# Patient Record
Sex: MALE | Race: AMERICAN INDIAN OR ALASKA NATIVE | Employment: OTHER | URBAN - METROPOLITAN AREA
[De-identification: names, ages, dates, MRNs, and addresses within clinical notes are randomized per-mention and may not be internally consistent; named-entity substitution may affect disease eponyms.]

---

## 2017-06-05 PROBLEM — I77.9 BILATERAL CAROTID ARTERY DISEASE (HCC): Status: ACTIVE | Noted: 2017-06-05

## 2017-06-22 PROCEDURE — 82570 ASSAY OF URINE CREATININE: CPT | Performed by: INTERNAL MEDICINE

## 2017-06-22 PROCEDURE — 82043 UR ALBUMIN QUANTITATIVE: CPT | Performed by: INTERNAL MEDICINE

## 2017-09-26 PROBLEM — M17.12 PRIMARY OSTEOARTHRITIS OF LEFT KNEE: Status: ACTIVE | Noted: 2017-09-26

## 2017-12-15 ENCOUNTER — HOSPITAL ENCOUNTER (EMERGENCY)
Facility: HOSPITAL | Age: 82
Discharge: HOME | End: 2017-12-15
Attending: EMERGENCY MEDICINE
Payer: MEDICARE

## 2017-12-15 VITALS
OXYGEN SATURATION: 95 % | HEIGHT: 70 IN | DIASTOLIC BLOOD PRESSURE: 92 MMHG | TEMPERATURE: 97 F | WEIGHT: 200 LBS | RESPIRATION RATE: 18 BRPM | BODY MASS INDEX: 28.63 KG/M2 | SYSTOLIC BLOOD PRESSURE: 179 MMHG | HEART RATE: 77 BPM

## 2017-12-15 DIAGNOSIS — K06.8 GUMS, BLEEDING: Primary | ICD-10-CM

## 2017-12-15 PROCEDURE — 85730 THROMBOPLASTIN TIME PARTIAL: CPT | Performed by: EMERGENCY MEDICINE

## 2017-12-15 PROCEDURE — 85025 COMPLETE CBC W/AUTO DIFF WBC: CPT | Performed by: EMERGENCY MEDICINE

## 2017-12-15 PROCEDURE — 99283 EMERGENCY DEPT VISIT LOW MDM: CPT

## 2017-12-15 PROCEDURE — 85610 PROTHROMBIN TIME: CPT | Performed by: EMERGENCY MEDICINE

## 2017-12-15 PROCEDURE — 36415 COLL VENOUS BLD VENIPUNCTURE: CPT

## 2017-12-15 PROCEDURE — 80053 COMPREHEN METABOLIC PANEL: CPT | Performed by: EMERGENCY MEDICINE

## 2017-12-15 NOTE — ED PROVIDER NOTES
Patient Seen in: [facility] Emergency Department    History   Patient presents with:  Bleeding (hematologic)    Stated Complaint: bleeding on blood thinners    HPI    61-year-old male presents emergency department who has been having some bleeding f Systems    Positive for stated complaint: bleeding on blood thinners  Other systems are as noted in HPI. Constitutional and vital signs reviewed. All other systems reviewed and negative except as noted above.     Physical Exam   ED Triage Vitals [12/1 units/mL.      PROTHROMBIN TIME (PT) - Abnormal; Notable for the following:     PT 20.2 (*)     INR 1.70 (*)     All other components within normal limits   CBC W/ DIFFERENTIAL - Abnormal; Notable for the following:     HGB 12.4 (*)     MCV 77.0 (*)     [facility] Medication List as of 12/15/2017  6:42 PM

## 2017-12-15 NOTE — ED NOTES
Pt resting on cart. Pt rinsed mouth with water and is applying pressure to location that is bleeding. Family at bedside. Updated on plan.

## 2018-03-01 PROCEDURE — 82570 ASSAY OF URINE CREATININE: CPT | Performed by: NURSE PRACTITIONER

## 2018-03-01 PROCEDURE — 82043 UR ALBUMIN QUANTITATIVE: CPT | Performed by: NURSE PRACTITIONER

## 2018-04-23 PROBLEM — M19.079 ARTHRITIS OF FOOT: Status: ACTIVE | Noted: 2018-04-23

## 2018-06-29 PROBLEM — I50.32 CHRONIC DIASTOLIC HEART FAILURE (HCC): Status: ACTIVE | Noted: 2018-06-29

## 2018-07-17 PROBLEM — I70.0 AORTIC ATHEROSCLEROSIS (HCC): Status: ACTIVE | Noted: 2018-07-17

## 2018-07-17 PROBLEM — R80.9 MICROALBUMINURIA DUE TO TYPE 2 DIABETES MELLITUS (HCC): Status: ACTIVE | Noted: 2018-07-17

## 2018-07-17 PROBLEM — R80.9 MICROALBUMINURIA DUE TO TYPE 2 DIABETES MELLITUS: Status: ACTIVE | Noted: 2018-07-17

## 2018-07-17 PROBLEM — E11.65 UNCONTROLLED TYPE 2 DIABETES MELLITUS WITH HYPERGLYCEMIA, WITHOUT LONG-TERM CURRENT USE OF INSULIN (HCC): Status: ACTIVE | Noted: 2018-07-17

## 2018-07-17 PROBLEM — E11.29 MICROALBUMINURIA DUE TO TYPE 2 DIABETES MELLITUS (HCC): Status: ACTIVE | Noted: 2018-07-17

## 2018-07-17 PROBLEM — E11.29 MICROALBUMINURIA DUE TO TYPE 2 DIABETES MELLITUS: Status: ACTIVE | Noted: 2018-07-17

## 2018-08-24 ENCOUNTER — ANESTHESIA EVENT (OUTPATIENT)
Dept: ENDOSCOPY | Facility: HOSPITAL | Age: 83
End: 2018-08-24
Payer: MEDICARE

## 2018-08-24 RX ORDER — SODIUM CHLORIDE, SODIUM LACTATE, POTASSIUM CHLORIDE, CALCIUM CHLORIDE 600; 310; 30; 20 MG/100ML; MG/100ML; MG/100ML; MG/100ML
INJECTION, SOLUTION INTRAVENOUS CONTINUOUS
Status: CANCELLED | OUTPATIENT
Start: 2018-08-24

## 2018-09-05 ENCOUNTER — ANESTHESIA (OUTPATIENT)
Dept: ENDOSCOPY | Facility: HOSPITAL | Age: 83
End: 2018-09-05
Payer: MEDICARE

## 2018-09-05 ENCOUNTER — HOSPITAL ENCOUNTER (OUTPATIENT)
Facility: HOSPITAL | Age: 83
Setting detail: HOSPITAL OUTPATIENT SURGERY
Discharge: HOME | End: 2018-09-05
Attending: INTERNAL MEDICINE | Admitting: INTERNAL MEDICINE
Payer: MEDICARE

## 2018-09-05 VITALS
WEIGHT: 195 LBS | HEART RATE: 100 BPM | HEIGHT: 70 IN | OXYGEN SATURATION: 97 % | BODY MASS INDEX: 27.92 KG/M2 | SYSTOLIC BLOOD PRESSURE: 107 MMHG | RESPIRATION RATE: 20 BRPM | TEMPERATURE: 98 F | DIASTOLIC BLOOD PRESSURE: 73 MMHG

## 2018-09-05 DIAGNOSIS — R13.19 ESOPHAGEAL DYSPHAGIA: ICD-10-CM

## 2018-09-05 LAB — GLUCOSE BLD-MCNC: 132 MG/DL (ref 65–99)

## 2018-09-05 PROCEDURE — 0DB38ZX EXCISION OF LOWER ESOPHAGUS, VIA NATURAL OR ARTIFICIAL OPENING ENDOSCOPIC, DIAGNOSTIC: ICD-10-PCS | Performed by: INTERNAL MEDICINE

## 2018-09-05 PROCEDURE — 82962 GLUCOSE BLOOD TEST: CPT

## 2018-09-05 PROCEDURE — 0D738ZZ DILATION OF LOWER ESOPHAGUS, VIA NATURAL OR ARTIFICIAL OPENING ENDOSCOPIC: ICD-10-PCS | Performed by: INTERNAL MEDICINE

## 2018-09-05 PROCEDURE — 88305 TISSUE EXAM BY PATHOLOGIST: CPT | Performed by: INTERNAL MEDICINE

## 2018-09-05 RX ORDER — SODIUM CHLORIDE, SODIUM LACTATE, POTASSIUM CHLORIDE, CALCIUM CHLORIDE 600; 310; 30; 20 MG/100ML; MG/100ML; MG/100ML; MG/100ML
INJECTION, SOLUTION INTRAVENOUS CONTINUOUS
Status: DISCONTINUED | OUTPATIENT
Start: 2018-09-05 | End: 2018-09-05

## 2018-09-05 RX ORDER — DEXTROSE MONOHYDRATE 25 G/50ML
50 INJECTION, SOLUTION INTRAVENOUS
Status: DISCONTINUED | OUTPATIENT
Start: 2018-09-05 | End: 2018-09-05

## 2018-09-05 RX ORDER — NALOXONE HYDROCHLORIDE 0.4 MG/ML
80 INJECTION, SOLUTION INTRAMUSCULAR; INTRAVENOUS; SUBCUTANEOUS AS NEEDED
Status: DISCONTINUED | OUTPATIENT
Start: 2018-09-05 | End: 2018-09-05

## 2018-09-05 NOTE — OPERATIVE REPORT
[facility] OPERATIVE REPORT   PATIENT NAME: Tevin Cunningham  MRN: [id number]  DATE OF OPERATION: 9/5/2018  PREOPERATIVE DIAGNOSIS:   1. GERD  2.     Dysphagia  POSTOPERATIVE DIAGNOSES:    Normal duodenum    Normal stomach  Dist

## 2018-09-05 NOTE — ANESTHESIA PREPROCEDURE EVALUATION
PRE-OP EVALUATION    Patient Name: Elisa Ferguson    Pre-op Diagnosis: Esophageal dysphagia [R13.10]    Procedure(s):  ESOPHAGOGASTRODUODENOSCOPY    Surgeon(s) and Role:     * Leonardo Mendez MD - Primary    Pre-op vitals reviewed.   Temp: 97.7 °F (36.5 °C) 90 tablet Rfl: 3   Levothyroxine Sodium (SYNTHROID, LEVOTHROID) 75 MCG Oral Tab Take 75 mcg by mouth before breakfast. Disp:  Rfl:        Allergies: Patient has no known allergies. Anesthesia Evaluation    Patient summary reviewed.     Anesthetic Compl plan discussed with surgeon and patient.       Plan/risks discussed with: patient                Present on Admission:  **None**

## 2018-09-05 NOTE — H&P
HPI:  Larry Davis is a [de-identified]year old male. 8/13/1935. Patient presents with:  Abdominal Pain: Stomach pain. Gas           Thank you for sending your patient to see me for evaluation of  Epigastric pain .     Pt presents for complaints of abdominal pain. [facility]  9/16/2015: [facility] CATARACT EXTRACAP,INSERT LENS Left      Comment: Procedure: LEFT PHACOEMULSIFICATION OF                CATARACT WITH INTRAOCULAR LENS IMPLANT [id number];                 Surgeon:  Charlotte العراقي MD;  Location: [facility]                SURGICAL CE mg total) by mouth daily. Disp: 90 capsule Rfl: 3   GLIMEPIRIDE 2 MG Oral Tab TAKE 1 TABLET BY MOUTH DAILY WITH BREAKFAST Disp: 90 tablet Rfl: 3   MetFORMIN HCl 1000 MG Oral Tab Take 1 tablet (1,000 mg total) by mouth 2 (two) times daily with meals.  Disp: bleeding or bruising  ENDOCRINE: denies inc in thirst or heat/cold intolerance  ALL/ASTHMA: denies hx of allergy or asthma        PHYSICAL EXAM:   /74   Ht 5' 10\" (1.778 m)   Wt 208 lb (94.3 kg)   BMI 29.84 kg/m²    GENERAL: well developed, well nou A  low-density focus within the anterior lower pole left kidney is too small to characterize but  statistically likely represents a cyst. No abdominal or pelvic lymphadenopathy is identified.  There  is mild nonspecific thickening of the distal esophagus an calculi. Spleen:  The spleen measures 9.2 cm and demonstrates homogeneous parenchyma. Aorta: Loye Luke is no abdominal aortic aneurysm.    IVC: Patent. Free Fluid:  None.  =====  IMPRESSION:    1. Incomplete assessment of pancreas. 2. Hepatic steatosis.

## 2018-09-10 NOTE — PROGRESS NOTES
9/10/2018  Chand Can  [address]  [place] [id number]    Dear Beena Bautista,       Here are the biopsy/pathology findings from your recent EGD (Upper  Endoscopy) :     reflux esophagitis - an inflammation of the esophagus due to GERD.       Follow Up:

## 2018-11-23 ENCOUNTER — IMAGING SERVICES (OUTPATIENT)
Dept: OTHER | Age: 83
End: 2018-11-23

## 2019-01-24 ENCOUNTER — IMAGING SERVICES (OUTPATIENT)
Dept: OTHER | Age: 84
End: 2019-01-24

## 2019-04-01 PROBLEM — I48.20 CHRONIC ATRIAL FIBRILLATION (HCC): Status: ACTIVE | Noted: 2019-04-01

## 2019-05-26 PROBLEM — E11.59 TYPE 2 DIABETES MELLITUS WITH CIRCULATORY DISORDER, WITHOUT LONG-TERM CURRENT USE OF INSULIN (HCC): Status: ACTIVE | Noted: 2019-05-26

## 2019-07-01 PROBLEM — I77.810 AORTIC ROOT DILATION (HCC): Status: ACTIVE | Noted: 2019-07-01

## 2019-07-02 PROCEDURE — 87077 CULTURE AEROBIC IDENTIFY: CPT | Performed by: INTERNAL MEDICINE

## 2019-07-02 PROCEDURE — 87086 URINE CULTURE/COLONY COUNT: CPT | Performed by: INTERNAL MEDICINE

## 2019-07-02 PROCEDURE — 87186 SC STD MICRODIL/AGAR DIL: CPT | Performed by: INTERNAL MEDICINE

## 2019-08-28 PROBLEM — R35.0 URINARY FREQUENCY: Status: ACTIVE | Noted: 2019-08-28

## 2019-09-06 ENCOUNTER — TELEPHONE (OUTPATIENT)
Dept: CT IMAGING | Facility: HOSPITAL | Age: 84
End: 2019-09-06

## 2020-06-01 NOTE — IMAGING NOTE
BS K173 pacer is NOT MR conditional. Updated daughter-in-law. Will follow up with ordering MD. Exam to be canceled. Daughter-in-law verbalized understanding.

## 2020-06-08 ENCOUNTER — HOSPITAL ENCOUNTER (OUTPATIENT)
Dept: MRI IMAGING | Facility: HOSPITAL | Age: 85
Discharge: HOME | End: 2020-06-08
Attending: ORTHOPAEDIC SURGERY
Payer: MEDICARE

## 2020-08-24 PROBLEM — Z79.01 CHRONIC ANTICOAGULATION: Status: ACTIVE | Noted: 2020-08-24

## 2020-08-24 PROBLEM — D68.69 SECONDARY HYPERCOAGULABLE STATE (HCC): Status: ACTIVE | Noted: 2020-08-24

## 2020-08-24 PROBLEM — M17.0 PRIMARY OSTEOARTHRITIS OF BOTH KNEES: Status: ACTIVE | Noted: 2020-08-24

## 2020-10-05 PROBLEM — M19.079 ARTHRITIS OF FOOT: Status: RESOLVED | Noted: 2018-04-23 | Resolved: 2020-10-05

## 2020-10-06 PROBLEM — M12.811 RIGHT ROTATOR CUFF TEAR ARTHROPATHY: Status: ACTIVE | Noted: 2020-10-06

## 2020-10-06 PROBLEM — M25.011: Status: ACTIVE | Noted: 2020-10-06

## 2020-10-06 PROBLEM — M75.101 RIGHT ROTATOR CUFF TEAR ARTHROPATHY: Status: ACTIVE | Noted: 2020-10-06

## 2021-06-30 PROBLEM — M25.011: Status: RESOLVED | Noted: 2020-10-06 | Resolved: 2021-06-30

## 2021-06-30 PROBLEM — E78.2 MIXED HYPERLIPIDEMIA: Status: ACTIVE | Noted: 2021-06-30

## 2021-06-30 PROBLEM — I65.23 BILATERAL CAROTID ARTERY STENOSIS: Status: ACTIVE | Noted: 2021-06-30

## 2022-05-24 ENCOUNTER — HOSPITAL ENCOUNTER (OUTPATIENT)
Dept: INTERVENTIONAL RADIOLOGY/VASCULAR | Facility: HOSPITAL | Age: 87
Discharge: HOME | End: 2022-05-24
Attending: INTERNAL MEDICINE | Admitting: INTERNAL MEDICINE
Payer: MEDICARE

## 2022-05-24 VITALS
WEIGHT: 187 LBS | HEIGHT: 71 IN | OXYGEN SATURATION: 97 % | BODY MASS INDEX: 26.18 KG/M2 | DIASTOLIC BLOOD PRESSURE: 79 MMHG | HEART RATE: 66 BPM | SYSTOLIC BLOOD PRESSURE: 155 MMHG | TEMPERATURE: 98 F | RESPIRATION RATE: 25 BRPM

## 2022-05-24 DIAGNOSIS — Z45.010 PACEMAKER AT END OF BATTERY LIFE: ICD-10-CM

## 2022-05-24 LAB — GLUCOSE BLD-MCNC: 142 MG/DL (ref 70–99)

## 2022-05-24 PROCEDURE — 0JPT0PZ REMOVAL OF CARDIAC RHYTHM RELATED DEVICE FROM TRUNK SUBCUTANEOUS TISSUE AND FASCIA, OPEN APPROACH: ICD-10-PCS | Performed by: INTERNAL MEDICINE

## 2022-05-24 PROCEDURE — 0JH606Z INSERTION OF PACEMAKER, DUAL CHAMBER INTO CHEST SUBCUTANEOUS TISSUE AND FASCIA, OPEN APPROACH: ICD-10-PCS | Performed by: INTERNAL MEDICINE

## 2022-05-24 PROCEDURE — 99153 MOD SED SAME PHYS/QHP EA: CPT | Performed by: INTERNAL MEDICINE

## 2022-05-24 PROCEDURE — 3E0102A INTRODUCTION OF ANTI-INFECTIVE ENVELOPE INTO SUBCUTANEOUS TISSUE, OPEN APPROACH: ICD-10-PCS | Performed by: INTERNAL MEDICINE

## 2022-05-24 PROCEDURE — 99152 MOD SED SAME PHYS/QHP 5/>YRS: CPT | Performed by: INTERNAL MEDICINE

## 2022-05-24 PROCEDURE — 82962 GLUCOSE BLOOD TEST: CPT

## 2022-05-24 PROCEDURE — 33228 REMV&REPLC PM GEN DUAL LEAD: CPT | Performed by: INTERNAL MEDICINE

## 2022-05-24 RX ORDER — LIDOCAINE HYDROCHLORIDE 10 MG/ML
INJECTION, SOLUTION EPIDURAL; INFILTRATION; INTRACAUDAL; PERINEURAL
Status: COMPLETED
Start: 2022-05-24 | End: 2022-05-24

## 2022-05-24 RX ORDER — CEFAZOLIN SODIUM/WATER 2 G/20 ML
SYRINGE (ML) INTRAVENOUS
Status: COMPLETED
Start: 2022-05-24 | End: 2022-05-24

## 2022-05-24 RX ORDER — CEFAZOLIN SODIUM/WATER 2 G/20 ML
2 SYRINGE (ML) INTRAVENOUS
Status: DISCONTINUED | OUTPATIENT
Start: 2022-05-24 | End: 2022-05-24 | Stop reason: HOSPADM

## 2022-05-24 RX ORDER — CHLORHEXIDINE GLUCONATE 4 G/100ML
30 SOLUTION TOPICAL
Status: DISCONTINUED | OUTPATIENT
Start: 2022-05-24 | End: 2022-05-24 | Stop reason: HOSPADM

## 2022-05-24 RX ORDER — MIDAZOLAM HYDROCHLORIDE 1 MG/ML
INJECTION INTRAMUSCULAR; INTRAVENOUS
Status: COMPLETED
Start: 2022-05-24 | End: 2022-05-24

## 2022-05-24 RX ORDER — SODIUM CHLORIDE 9 MG/ML
INJECTION, SOLUTION INTRAVENOUS
Status: DISCONTINUED | OUTPATIENT
Start: 2022-05-25 | End: 2022-05-24 | Stop reason: HOSPADM

## 2022-05-24 RX ORDER — VANCOMYCIN HYDROCHLORIDE 1 G/20ML
INJECTION, POWDER, LYOPHILIZED, FOR SOLUTION INTRAVENOUS
Status: COMPLETED
Start: 2022-05-24 | End: 2022-05-24

## 2022-05-24 NOTE — PROGRESS NOTES
Rc'd pt from cath lab s/p gen change in stable condition. VSS. Aquacel to left chedt is soft, clean and dry. No bleeding or hematoma. Pt denies c/o pain or discomfort. Dr Get Howe at bedside along with pts son. Pt tolerating po intake well.     13:20: Bedrest completed. Site is stable. IV removed. Pt ambulated and tolerated well. Voided. Reviewed discharge instructions with pts son, verbalizes understanding. Home via w/c in stable condition without c/o. Son is the .

## 2022-08-13 ENCOUNTER — APPOINTMENT (OUTPATIENT)
Dept: GENERAL RADIOLOGY | Facility: HOSPITAL | Age: 87
End: 2022-08-13
Payer: MEDICARE

## 2022-08-13 ENCOUNTER — HOSPITAL ENCOUNTER (EMERGENCY)
Facility: HOSPITAL | Age: 87
Discharge: HOME | End: 2022-08-14
Attending: EMERGENCY MEDICINE
Payer: MEDICARE

## 2022-08-13 DIAGNOSIS — R07.9 CHEST PAIN OF UNCERTAIN ETIOLOGY: Primary | ICD-10-CM

## 2022-08-13 LAB
ALBUMIN SERPL-MCNC: 3.6 G/DL (ref 3.4–5)
ALBUMIN/GLOB SERPL: 0.9 {RATIO} (ref 1–2)
ALP LIVER SERPL-CCNC: 146 U/L
ALT SERPL-CCNC: 27 U/L
ANION GAP SERPL CALC-SCNC: 3 MMOL/L (ref 0–18)
AST SERPL-CCNC: 18 U/L (ref 15–37)
BASOPHILS # BLD AUTO: 0.03 X10(3) UL (ref 0–0.2)
BASOPHILS NFR BLD AUTO: 0.5 %
BILIRUB SERPL-MCNC: 0.8 MG/DL (ref 0.1–2)
BUN BLD-MCNC: 26 MG/DL (ref 7–18)
CALCIUM BLD-MCNC: 9.2 MG/DL (ref 8.5–10.1)
CHLORIDE SERPL-SCNC: 98 MMOL/L (ref 98–112)
CO2 SERPL-SCNC: 30 MMOL/L (ref 21–32)
CREAT BLD-MCNC: 1.26 MG/DL
EOSINOPHIL # BLD AUTO: 0.13 X10(3) UL (ref 0–0.7)
EOSINOPHIL NFR BLD AUTO: 2 %
ERYTHROCYTE [DISTWIDTH] IN BLOOD BY AUTOMATED COUNT: 17.5 %
GFR SERPLBLD BASED ON 1.73 SQ M-ARVRAT: 56 ML/MIN/1.73M2 (ref 60–?)
GLOBULIN PLAS-MCNC: 3.8 G/DL (ref 2.8–4.4)
GLUCOSE BLD-MCNC: 123 MG/DL (ref 70–99)
HCT VFR BLD AUTO: 41.5 %
HGB BLD-MCNC: 13.4 G/DL
IMM GRANULOCYTES # BLD AUTO: 0.04 X10(3) UL (ref 0–1)
IMM GRANULOCYTES NFR BLD: 0.6 %
LYMPHOCYTES # BLD AUTO: 1.24 X10(3) UL (ref 1–4)
LYMPHOCYTES NFR BLD AUTO: 18.7 %
MCH RBC QN AUTO: 26.5 PG (ref 26–34)
MCHC RBC AUTO-ENTMCNC: 32.3 G/DL (ref 31–37)
MCV RBC AUTO: 82 FL
MONOCYTES # BLD AUTO: 0.52 X10(3) UL (ref 0.1–1)
MONOCYTES NFR BLD AUTO: 7.9 %
NEUTROPHILS # BLD AUTO: 4.66 X10 (3) UL (ref 1.5–7.7)
NEUTROPHILS # BLD AUTO: 4.66 X10(3) UL (ref 1.5–7.7)
NEUTROPHILS NFR BLD AUTO: 70.3 %
NT-PROBNP SERPL-MCNC: 506 PG/ML (ref ?–450)
OSMOLALITY SERPL CALC.SUM OF ELEC: 278 MOSM/KG (ref 275–295)
PLATELET # BLD AUTO: 156 10(3)UL (ref 150–450)
POTASSIUM SERPL-SCNC: 4 MMOL/L (ref 3.5–5.1)
PROT SERPL-MCNC: 7.4 G/DL (ref 6.4–8.2)
RBC # BLD AUTO: 5.06 X10(6)UL
SARS-COV-2 RNA RESP QL NAA+PROBE: NOT DETECTED
SODIUM SERPL-SCNC: 131 MMOL/L (ref 136–145)
TROPONIN I HIGH SENSITIVITY: 11 NG/L
TROPONIN I HIGH SENSITIVITY: 13 NG/L
WBC # BLD AUTO: 6.6 X10(3) UL (ref 4–11)

## 2022-08-13 PROCEDURE — 85025 COMPLETE CBC W/AUTO DIFF WBC: CPT | Performed by: EMERGENCY MEDICINE

## 2022-08-13 PROCEDURE — 36415 COLL VENOUS BLD VENIPUNCTURE: CPT

## 2022-08-13 PROCEDURE — 93005 ELECTROCARDIOGRAM TRACING: CPT

## 2022-08-13 PROCEDURE — 80053 COMPREHEN METABOLIC PANEL: CPT

## 2022-08-13 PROCEDURE — 83880 ASSAY OF NATRIURETIC PEPTIDE: CPT | Performed by: EMERGENCY MEDICINE

## 2022-08-13 PROCEDURE — 84484 ASSAY OF TROPONIN QUANT: CPT

## 2022-08-13 PROCEDURE — 99284 EMERGENCY DEPT VISIT MOD MDM: CPT

## 2022-08-13 PROCEDURE — 71045 X-RAY EXAM CHEST 1 VIEW: CPT

## 2022-08-13 PROCEDURE — 84484 ASSAY OF TROPONIN QUANT: CPT | Performed by: EMERGENCY MEDICINE

## 2022-08-13 PROCEDURE — 85025 COMPLETE CBC W/AUTO DIFF WBC: CPT

## 2022-08-13 PROCEDURE — 80053 COMPREHEN METABOLIC PANEL: CPT | Performed by: EMERGENCY MEDICINE

## 2022-08-13 PROCEDURE — 93010 ELECTROCARDIOGRAM REPORT: CPT

## 2022-08-14 VITALS
RESPIRATION RATE: 18 BRPM | HEART RATE: 56 BPM | DIASTOLIC BLOOD PRESSURE: 76 MMHG | TEMPERATURE: 98 F | SYSTOLIC BLOOD PRESSURE: 135 MMHG | OXYGEN SATURATION: 94 %

## 2022-08-14 NOTE — ED INITIAL ASSESSMENT (HPI)
A&Ox3 patient p/w c/o cp and sob    Patient endorses cp and sob x3 days. Intermittent pain, reports sometimes it gets worse when taking a deep breath in. Patient has chronic BLE edema, reports no new swelling. Patient took a dose of Sorbitrate 5mg PTA, denies any pain at this time. RR even/NL, speaking in full clear sentences. Has cardiac hx, has a pacemaker.

## 2022-08-15 LAB
ATRIAL RATE: 208 BPM
Q-T INTERVAL: 566 MS
QRS DURATION: 168 MS
QTC CALCULATION (BEZET): 541 MS
R AXIS: -87 DEGREES
T AXIS: 85 DEGREES
VENTRICULAR RATE: 55 BPM

## 2023-01-17 ENCOUNTER — HOSPITAL ENCOUNTER (EMERGENCY)
Facility: HOSPITAL | Age: 88
Discharge: HOME | End: 2023-01-17
Attending: EMERGENCY MEDICINE
Payer: MEDICARE

## 2023-01-17 VITALS
DIASTOLIC BLOOD PRESSURE: 73 MMHG | HEART RATE: 70 BPM | TEMPERATURE: 98 F | RESPIRATION RATE: 20 BRPM | OXYGEN SATURATION: 97 % | SYSTOLIC BLOOD PRESSURE: 160 MMHG

## 2023-01-17 DIAGNOSIS — Z79.01 ANTICOAGULATED BY ANTICOAGULATION TREATMENT: ICD-10-CM

## 2023-01-17 DIAGNOSIS — S09.91XA TRAUMA OF EAR CANAL, INITIAL ENCOUNTER: Primary | ICD-10-CM

## 2023-01-17 PROCEDURE — 99283 EMERGENCY DEPT VISIT LOW MDM: CPT

## 2023-01-17 RX ORDER — TRANEXAMIC ACID 100 MG/ML
INJECTION, SOLUTION INTRAVENOUS
Status: COMPLETED
Start: 2023-01-17 | End: 2023-01-17

## 2023-01-17 RX ORDER — TRANEXAMIC ACID 100 MG/ML
5 INJECTION, SOLUTION INTRAVENOUS ONCE
Status: COMPLETED | OUTPATIENT
Start: 2023-01-17 | End: 2023-01-17

## 2023-01-17 NOTE — ED INITIAL ASSESSMENT (HPI)
Pt to the ER via walk in d/t right ear pain/ bleeding. Per patient it started to bleed last night and into this morning when it became worse. Pt denies trauma or hitting his head.  Bleeding controlled on arrival.

## 2023-01-17 NOTE — DISCHARGE INSTRUCTIONS
Just keep the clean cotton balls in place.   Do not take Xarelto for the next 2 days so that the bleeding can improve    Nosebleeds, put oxymetolazone or Afrin- fill both nasal canals and put the nasal pincher in place for a total of 20 minutes

## 2023-04-26 ENCOUNTER — APPOINTMENT (OUTPATIENT)
Dept: CT IMAGING | Facility: HOSPITAL | Age: 88
End: 2023-04-26
Attending: EMERGENCY MEDICINE
Payer: MEDICARE

## 2023-04-26 ENCOUNTER — HOSPITAL ENCOUNTER (INPATIENT)
Facility: HOSPITAL | Age: 88
LOS: 4 days | Discharge: HOME | End: 2023-04-30
Attending: EMERGENCY MEDICINE | Admitting: HOSPITALIST
Payer: MEDICARE

## 2023-04-26 ENCOUNTER — HOSPITAL ENCOUNTER (INPATIENT)
Facility: HOSPITAL | Age: 88
LOS: 4 days | Discharge: HOME HEALTH | End: 2023-04-30
Attending: EMERGENCY MEDICINE | Admitting: HOSPITALIST
Payer: MEDICARE

## 2023-04-26 DIAGNOSIS — N17.9 AKI (ACUTE KIDNEY INJURY) (HCC): ICD-10-CM

## 2023-04-26 DIAGNOSIS — Z79.4 TYPE 2 DIABETES MELLITUS WITH HYPERGLYCEMIA, WITH LONG-TERM CURRENT USE OF INSULIN (HCC): ICD-10-CM

## 2023-04-26 DIAGNOSIS — S09.8XXA BLUNT HEAD TRAUMA, INITIAL ENCOUNTER: ICD-10-CM

## 2023-04-26 DIAGNOSIS — E87.6 HYPOKALEMIA: ICD-10-CM

## 2023-04-26 DIAGNOSIS — R10.9 ABDOMINAL PAIN, ACUTE: ICD-10-CM

## 2023-04-26 DIAGNOSIS — R77.8 ELEVATED TROPONIN: ICD-10-CM

## 2023-04-26 DIAGNOSIS — K92.2 GASTROINTESTINAL HEMORRHAGE, UNSPECIFIED GASTROINTESTINAL HEMORRHAGE TYPE: ICD-10-CM

## 2023-04-26 DIAGNOSIS — U07.1 COVID-19: Primary | ICD-10-CM

## 2023-04-26 DIAGNOSIS — R29.6 MULTIPLE FALLS: ICD-10-CM

## 2023-04-26 DIAGNOSIS — E11.65 TYPE 2 DIABETES MELLITUS WITH HYPERGLYCEMIA, WITH LONG-TERM CURRENT USE OF INSULIN (HCC): ICD-10-CM

## 2023-04-26 PROBLEM — R79.89 ELEVATED TROPONIN: Status: ACTIVE | Noted: 2023-04-26

## 2023-04-26 PROBLEM — R79.89 ELEVATED TROPONIN: Status: ACTIVE | Noted: 2023-01-01

## 2023-04-26 LAB
ALBUMIN SERPL-MCNC: 2.8 G/DL (ref 3.4–5)
ALBUMIN/GLOB SERPL: 0.7 {RATIO} (ref 1–2)
ALP LIVER SERPL-CCNC: 80 U/L
ALT SERPL-CCNC: 58 U/L
ANION GAP SERPL CALC-SCNC: 14 MMOL/L (ref 0–18)
ANTIBODY SCREEN: NEGATIVE
APTT PPP: 37.3 SECONDS (ref 23.3–35.6)
AST SERPL-CCNC: 34 U/L (ref 15–37)
BASOPHILS # BLD AUTO: 0.09 X10(3) UL (ref 0–0.2)
BASOPHILS NFR BLD AUTO: 0.5 %
BILIRUB SERPL-MCNC: 0.9 MG/DL (ref 0.1–2)
BUN BLD-MCNC: 71 MG/DL (ref 7–18)
CALCIUM BLD-MCNC: 9.1 MG/DL (ref 8.5–10.1)
CHLORIDE SERPL-SCNC: 90 MMOL/L (ref 98–112)
CHOLEST SERPL-MCNC: 130 MG/DL (ref ?–200)
CK SERPL-CCNC: 107 U/L
CO2 SERPL-SCNC: 24 MMOL/L (ref 21–32)
CREAT BLD-MCNC: 1.57 MG/DL
CRP SERPL-MCNC: 3.91 MG/DL (ref ?–0.3)
DEPRECATED HBV CORE AB SER IA-ACNC: 179 NG/ML
EOSINOPHIL # BLD AUTO: 0 X10(3) UL (ref 0–0.7)
EOSINOPHIL NFR BLD AUTO: 0 %
ERYTHROCYTE [DISTWIDTH] IN BLOOD BY AUTOMATED COUNT: 14.8 %
EST. AVERAGE GLUCOSE BLD GHB EST-MCNC: 258 MG/DL (ref 68–126)
GFR SERPLBLD BASED ON 1.73 SQ M-ARVRAT: 43 ML/MIN/1.73M2 (ref 60–?)
GLOBULIN PLAS-MCNC: 3.8 G/DL (ref 2.8–4.4)
GLUCOSE BLD-MCNC: 211 MG/DL (ref 70–99)
GLUCOSE BLD-MCNC: 323 MG/DL (ref 70–99)
GLUCOSE BLD-MCNC: 452 MG/DL (ref 70–99)
HBA1C MFR BLD: 10.6 % (ref ?–5.7)
HCT VFR BLD AUTO: 38.3 %
HDLC SERPL-MCNC: 39 MG/DL (ref 40–59)
HGB BLD-MCNC: 12.7 G/DL
HGB BLD-MCNC: 13.3 G/DL
IMM GRANULOCYTES # BLD AUTO: 0.49 X10(3) UL (ref 0–1)
IMM GRANULOCYTES NFR BLD: 2.6 %
INR BLD: 2.82 (ref 0.85–1.16)
LACTATE SERPL-SCNC: 2.7 MMOL/L (ref 0.4–2)
LACTATE SERPL-SCNC: 2.9 MMOL/L (ref 0.4–2)
LACTATE SERPL-SCNC: 3.1 MMOL/L (ref 0.4–2)
LACTATE SERPL-SCNC: 3.2 MMOL/L (ref 0.4–2)
LDH SERPL L TO P-CCNC: 232 U/L
LDLC SERPL CALC-MCNC: 63 MG/DL (ref ?–100)
LYMPHOCYTES # BLD AUTO: 1.1 X10(3) UL (ref 1–4)
LYMPHOCYTES NFR BLD AUTO: 5.7 %
MCH RBC QN AUTO: 28.3 PG (ref 26–34)
MCHC RBC AUTO-ENTMCNC: 34.7 G/DL (ref 31–37)
MCV RBC AUTO: 81.5 FL
MONOCYTES # BLD AUTO: 0.66 X10(3) UL (ref 0.1–1)
MONOCYTES NFR BLD AUTO: 3.4 %
NEUTROPHILS # BLD AUTO: 16.8 X10 (3) UL (ref 1.5–7.7)
NEUTROPHILS # BLD AUTO: 16.8 X10(3) UL (ref 1.5–7.7)
NEUTROPHILS NFR BLD AUTO: 87.8 %
NONHDLC SERPL-MCNC: 91 MG/DL (ref ?–130)
OSMOLALITY SERPL CALC.SUM OF ELEC: 306 MOSM/KG (ref 275–295)
PLATELET # BLD AUTO: 259 10(3)UL (ref 150–450)
POTASSIUM SERPL-SCNC: 3.1 MMOL/L (ref 3.5–5.1)
PROCALCITONIN SERPL-MCNC: 0.24 NG/ML (ref ?–0.16)
PROT SERPL-MCNC: 6.6 G/DL (ref 6.4–8.2)
PROTHROMBIN TIME: 29.3 SECONDS (ref 11.6–14.8)
Q-T INTERVAL: 364 MS
QRS DURATION: 94 MS
QTC CALCULATION (BEZET): 459 MS
R AXIS: 80 DEGREES
RBC # BLD AUTO: 4.7 X10(6)UL
RH BLOOD TYPE: POSITIVE
SARS-COV-2 RNA RESP QL NAA+PROBE: DETECTED
SODIUM SERPL-SCNC: 128 MMOL/L (ref 136–145)
T AXIS: 61 DEGREES
T4 FREE SERPL-MCNC: 1 NG/DL (ref 0.8–1.7)
TRIGL SERPL-MCNC: 166 MG/DL (ref 30–149)
TROPONIN I HIGH SENSITIVITY: 59 NG/L
TROPONIN I HIGH SENSITIVITY: 84 NG/L
TSI SER-ACNC: 2.78 MIU/ML (ref 0.36–3.74)
VENTRICULAR RATE: 96 BPM
VLDLC SERPL CALC-MCNC: 25 MG/DL (ref 0–30)
WBC # BLD AUTO: 19.1 X10(3) UL (ref 4–11)

## 2023-04-26 PROCEDURE — 82962 GLUCOSE BLOOD TEST: CPT

## 2023-04-26 PROCEDURE — 84484 ASSAY OF TROPONIN QUANT: CPT | Performed by: HOSPITALIST

## 2023-04-26 PROCEDURE — 84443 ASSAY THYROID STIM HORMONE: CPT | Performed by: HOSPITALIST

## 2023-04-26 PROCEDURE — C9113 INJ PANTOPRAZOLE SODIUM, VIA: HCPCS | Performed by: EMERGENCY MEDICINE

## 2023-04-26 PROCEDURE — 83615 LACTATE (LD) (LDH) ENZYME: CPT | Performed by: HOSPITALIST

## 2023-04-26 PROCEDURE — 72125 CT NECK SPINE W/O DYE: CPT | Performed by: EMERGENCY MEDICINE

## 2023-04-26 PROCEDURE — XW033E5 INTRODUCTION OF REMDESIVIR ANTI-INFECTIVE INTO PERIPHERAL VEIN, PERCUTANEOUS APPROACH, NEW TECHNOLOGY GROUP 5: ICD-10-PCS | Performed by: HOSPITALIST

## 2023-04-26 PROCEDURE — 83605 ASSAY OF LACTIC ACID: CPT | Performed by: HOSPITALIST

## 2023-04-26 PROCEDURE — 87040 BLOOD CULTURE FOR BACTERIA: CPT | Performed by: HOSPITALIST

## 2023-04-26 PROCEDURE — 86901 BLOOD TYPING SEROLOGIC RH(D): CPT | Performed by: EMERGENCY MEDICINE

## 2023-04-26 PROCEDURE — 86900 BLOOD TYPING SEROLOGIC ABO: CPT | Performed by: EMERGENCY MEDICINE

## 2023-04-26 PROCEDURE — 84484 ASSAY OF TROPONIN QUANT: CPT | Performed by: EMERGENCY MEDICINE

## 2023-04-26 PROCEDURE — 85610 PROTHROMBIN TIME: CPT | Performed by: EMERGENCY MEDICINE

## 2023-04-26 PROCEDURE — 83036 HEMOGLOBIN GLYCOSYLATED A1C: CPT | Performed by: HOSPITALIST

## 2023-04-26 PROCEDURE — 82272 OCCULT BLD FECES 1-3 TESTS: CPT

## 2023-04-26 PROCEDURE — 70450 CT HEAD/BRAIN W/O DYE: CPT | Performed by: EMERGENCY MEDICINE

## 2023-04-26 PROCEDURE — 93005 ELECTROCARDIOGRAM TRACING: CPT

## 2023-04-26 PROCEDURE — 82728 ASSAY OF FERRITIN: CPT | Performed by: HOSPITALIST

## 2023-04-26 PROCEDURE — 84439 ASSAY OF FREE THYROXINE: CPT | Performed by: HOSPITALIST

## 2023-04-26 PROCEDURE — 85018 HEMOGLOBIN: CPT | Performed by: HOSPITALIST

## 2023-04-26 PROCEDURE — 71250 CT THORAX DX C-: CPT | Performed by: EMERGENCY MEDICINE

## 2023-04-26 PROCEDURE — 84145 PROCALCITONIN (PCT): CPT | Performed by: HOSPITALIST

## 2023-04-26 PROCEDURE — 85025 COMPLETE CBC W/AUTO DIFF WBC: CPT | Performed by: EMERGENCY MEDICINE

## 2023-04-26 PROCEDURE — 80053 COMPREHEN METABOLIC PANEL: CPT | Performed by: EMERGENCY MEDICINE

## 2023-04-26 PROCEDURE — 82550 ASSAY OF CK (CPK): CPT | Performed by: HOSPITALIST

## 2023-04-26 PROCEDURE — 96375 TX/PRO/DX INJ NEW DRUG ADDON: CPT

## 2023-04-26 PROCEDURE — 80061 LIPID PANEL: CPT | Performed by: EMERGENCY MEDICINE

## 2023-04-26 PROCEDURE — 96361 HYDRATE IV INFUSION ADD-ON: CPT

## 2023-04-26 PROCEDURE — 99285 EMERGENCY DEPT VISIT HI MDM: CPT

## 2023-04-26 PROCEDURE — 86850 RBC ANTIBODY SCREEN: CPT | Performed by: EMERGENCY MEDICINE

## 2023-04-26 PROCEDURE — 74176 CT ABD & PELVIS W/O CONTRAST: CPT | Performed by: EMERGENCY MEDICINE

## 2023-04-26 PROCEDURE — 93010 ELECTROCARDIOGRAM REPORT: CPT

## 2023-04-26 PROCEDURE — 96365 THER/PROPH/DIAG IV INF INIT: CPT

## 2023-04-26 PROCEDURE — 86140 C-REACTIVE PROTEIN: CPT | Performed by: HOSPITALIST

## 2023-04-26 PROCEDURE — 85730 THROMBOPLASTIN TIME PARTIAL: CPT | Performed by: EMERGENCY MEDICINE

## 2023-04-26 RX ORDER — LEVOTHYROXINE SODIUM 0.03 MG/1
25 TABLET ORAL
Status: DISCONTINUED | OUTPATIENT
Start: 2023-04-27 | End: 2023-04-30

## 2023-04-26 RX ORDER — DEXTROSE MONOHYDRATE 25 G/50ML
50 INJECTION, SOLUTION INTRAVENOUS
Status: DISCONTINUED | OUTPATIENT
Start: 2023-04-26 | End: 2023-04-30

## 2023-04-26 RX ORDER — NICOTINE POLACRILEX 4 MG
15 LOZENGE BUCCAL
Status: DISCONTINUED | OUTPATIENT
Start: 2023-04-26 | End: 2023-04-30

## 2023-04-26 RX ORDER — TAMSULOSIN HYDROCHLORIDE 0.4 MG/1
0.4 CAPSULE ORAL DAILY
Status: DISCONTINUED | OUTPATIENT
Start: 2023-04-26 | End: 2023-04-30

## 2023-04-26 RX ORDER — ATORVASTATIN CALCIUM 40 MG/1
40 TABLET, FILM COATED ORAL NIGHTLY
Status: DISCONTINUED | OUTPATIENT
Start: 2023-04-26 | End: 2023-04-30

## 2023-04-26 RX ORDER — SODIUM CHLORIDE 9 MG/ML
1000 INJECTION, SOLUTION INTRAVENOUS ONCE
Status: COMPLETED | OUTPATIENT
Start: 2023-04-26 | End: 2023-04-26

## 2023-04-26 RX ORDER — POTASSIUM CHLORIDE 20 MEQ/1
40 TABLET, EXTENDED RELEASE ORAL ONCE
Status: COMPLETED | OUTPATIENT
Start: 2023-04-26 | End: 2023-04-26

## 2023-04-26 RX ORDER — BISACODYL 10 MG
10 SUPPOSITORY, RECTAL RECTAL
Status: DISCONTINUED | OUTPATIENT
Start: 2023-04-26 | End: 2023-04-30

## 2023-04-26 RX ORDER — METOPROLOL TARTRATE 50 MG/1
50 TABLET, FILM COATED ORAL
Status: DISCONTINUED | OUTPATIENT
Start: 2023-04-26 | End: 2023-04-30

## 2023-04-26 RX ORDER — LEVOTHYROXINE SODIUM 0.03 MG/1
25 TABLET ORAL
COMMUNITY

## 2023-04-26 RX ORDER — ASPIRIN 81 MG/1
81 TABLET ORAL
Status: DISCONTINUED | OUTPATIENT
Start: 2023-04-26 | End: 2023-04-26

## 2023-04-26 RX ORDER — METOCLOPRAMIDE HYDROCHLORIDE 5 MG/ML
5 INJECTION INTRAMUSCULAR; INTRAVENOUS EVERY 8 HOURS PRN
Status: DISCONTINUED | OUTPATIENT
Start: 2023-04-26 | End: 2023-04-30

## 2023-04-26 RX ORDER — SODIUM CHLORIDE 9 MG/ML
INJECTION, SOLUTION INTRAVENOUS CONTINUOUS
Status: DISCONTINUED | OUTPATIENT
Start: 2023-04-26 | End: 2023-04-29

## 2023-04-26 RX ORDER — SENNOSIDES 8.6 MG
17.2 TABLET ORAL NIGHTLY PRN
Status: DISCONTINUED | OUTPATIENT
Start: 2023-04-26 | End: 2023-04-30

## 2023-04-26 RX ORDER — ALBUTEROL SULFATE 90 UG/1
4 AEROSOL, METERED RESPIRATORY (INHALATION) EVERY 4 HOURS PRN
Status: DISCONTINUED | OUTPATIENT
Start: 2023-04-26 | End: 2023-04-30

## 2023-04-26 RX ORDER — SODIUM CHLORIDE 9 MG/ML
INJECTION, SOLUTION INTRAVENOUS CONTINUOUS
Status: DISCONTINUED | OUTPATIENT
Start: 2023-04-26 | End: 2023-04-26

## 2023-04-26 RX ORDER — POLYETHYLENE GLYCOL 3350 17 G/17G
17 POWDER, FOR SOLUTION ORAL DAILY PRN
Status: DISCONTINUED | OUTPATIENT
Start: 2023-04-26 | End: 2023-04-30

## 2023-04-26 RX ORDER — CEFUROXIME AXETIL 250 MG/1
TABLET ORAL 2 TIMES DAILY
COMMUNITY
End: 2023-04-30

## 2023-04-26 RX ORDER — SODIUM CHLORIDE 9 MG/ML
INJECTION, SOLUTION INTRAVENOUS CONTINUOUS
Status: ACTIVE | OUTPATIENT
Start: 2023-04-26 | End: 2023-04-26

## 2023-04-26 RX ORDER — NICOTINE POLACRILEX 4 MG
30 LOZENGE BUCCAL
Status: DISCONTINUED | OUTPATIENT
Start: 2023-04-26 | End: 2023-04-30

## 2023-04-26 RX ORDER — ACETAMINOPHEN 500 MG
500 TABLET ORAL EVERY 4 HOURS PRN
Status: DISCONTINUED | OUTPATIENT
Start: 2023-04-26 | End: 2023-04-30

## 2023-04-26 RX ORDER — TORSEMIDE 20 MG/1
20 TABLET ORAL DAILY
COMMUNITY
End: 2023-04-30

## 2023-04-26 RX ORDER — INSULIN ASPART 100 [IU]/ML
0.2 INJECTION, SOLUTION INTRAVENOUS; SUBCUTANEOUS ONCE
Status: COMPLETED | OUTPATIENT
Start: 2023-04-26 | End: 2023-04-26

## 2023-04-26 RX ORDER — ONDANSETRON 2 MG/ML
4 INJECTION INTRAMUSCULAR; INTRAVENOUS EVERY 6 HOURS PRN
Status: DISCONTINUED | OUTPATIENT
Start: 2023-04-26 | End: 2023-04-30

## 2023-04-26 RX ORDER — ONDANSETRON 2 MG/ML
4 INJECTION INTRAMUSCULAR; INTRAVENOUS EVERY 4 HOURS PRN
Status: ACTIVE | OUTPATIENT
Start: 2023-04-26 | End: 2023-04-26

## 2023-04-26 NOTE — ED QUICK NOTES
Orders for admission, patient is aware of plan and ready to go upstairs. Any questions, please call ED PRICILLA Szymanski  at extension [phone]. Vaccinated? yes  Type of COVID test sent:rapid  COVID Suspicion level:High      Titratable drug(s) infusing:none  Rate:    LOC at time of transport:alert    Other pertinent information:    CIWA score=  NIH score=

## 2023-04-26 NOTE — PROGRESS NOTES
NURSING ADMISSION NOTE      Patient admitted via Cart  Oriented to room. Safety precautions initiated. Bed in low position. Call light in reach. Received pt from ED. AOx4. Jimmy speaking. RA. Navigator completed w/ pt and son. Admission orders received.

## 2023-04-26 NOTE — ED INITIAL ASSESSMENT (HPI)
Family states pt has fallen twice, small wound to head,  No LOC   Also c/o blood in urine and stool,  Weakness and fatigue for a few days.

## 2023-04-27 LAB
ALBUMIN SERPL-MCNC: 2.4 G/DL (ref 3.4–5)
ALBUMIN/GLOB SERPL: 0.7 {RATIO} (ref 1–2)
ALP LIVER SERPL-CCNC: 75 U/L
ALT SERPL-CCNC: 49 U/L
ANION GAP SERPL CALC-SCNC: 4 MMOL/L (ref 0–18)
AST SERPL-CCNC: 25 U/L (ref 15–37)
BASOPHILS # BLD AUTO: 0.05 X10(3) UL (ref 0–0.2)
BASOPHILS NFR BLD AUTO: 0.3 %
BILIRUB SERPL-MCNC: 0.5 MG/DL (ref 0.1–2)
BUN BLD-MCNC: 49 MG/DL (ref 7–18)
CALCIUM BLD-MCNC: 8.4 MG/DL (ref 8.5–10.1)
CHLORIDE SERPL-SCNC: 107 MMOL/L (ref 98–112)
CO2 SERPL-SCNC: 24 MMOL/L (ref 21–32)
CREAT BLD-MCNC: 1.1 MG/DL
EOSINOPHIL # BLD AUTO: 0.01 X10(3) UL (ref 0–0.7)
EOSINOPHIL NFR BLD AUTO: 0.1 %
ERYTHROCYTE [DISTWIDTH] IN BLOOD BY AUTOMATED COUNT: 15.1 %
GFR SERPLBLD BASED ON 1.73 SQ M-ARVRAT: 66 ML/MIN/1.73M2 (ref 60–?)
GLOBULIN PLAS-MCNC: 3.4 G/DL (ref 2.8–4.4)
GLUCOSE BLD-MCNC: 200 MG/DL (ref 70–99)
GLUCOSE BLD-MCNC: 210 MG/DL (ref 70–99)
GLUCOSE BLD-MCNC: 225 MG/DL (ref 70–99)
GLUCOSE BLD-MCNC: 277 MG/DL (ref 70–99)
GLUCOSE BLD-MCNC: 280 MG/DL (ref 70–99)
HCT VFR BLD AUTO: 36.4 %
HGB BLD-MCNC: 12.2 G/DL
IMM GRANULOCYTES # BLD AUTO: 0.35 X10(3) UL (ref 0–1)
IMM GRANULOCYTES NFR BLD: 2.1 %
LACTATE SERPL-SCNC: 1.5 MMOL/L (ref 0.4–2)
LYMPHOCYTES # BLD AUTO: 0.94 X10(3) UL (ref 1–4)
LYMPHOCYTES NFR BLD AUTO: 5.5 %
MAGNESIUM SERPL-MCNC: 2.9 MG/DL (ref 1.6–2.6)
MCH RBC QN AUTO: 27.8 PG (ref 26–34)
MCHC RBC AUTO-ENTMCNC: 33.5 G/DL (ref 31–37)
MCV RBC AUTO: 82.9 FL
MONOCYTES # BLD AUTO: 0.63 X10(3) UL (ref 0.1–1)
MONOCYTES NFR BLD AUTO: 3.7 %
NEUTROPHILS # BLD AUTO: 15.08 X10 (3) UL (ref 1.5–7.7)
NEUTROPHILS # BLD AUTO: 15.08 X10(3) UL (ref 1.5–7.7)
NEUTROPHILS NFR BLD AUTO: 88.3 %
OSMOLALITY SERPL CALC.SUM OF ELEC: 299 MOSM/KG (ref 275–295)
PLATELET # BLD AUTO: 225 10(3)UL (ref 150–450)
POTASSIUM SERPL-SCNC: 3.5 MMOL/L (ref 3.5–5.1)
POTASSIUM SERPL-SCNC: 4.5 MMOL/L (ref 3.5–5.1)
PROT SERPL-MCNC: 5.8 G/DL (ref 6.4–8.2)
RBC # BLD AUTO: 4.39 X10(6)UL
SODIUM SERPL-SCNC: 135 MMOL/L (ref 136–145)
WBC # BLD AUTO: 17.1 X10(3) UL (ref 4–11)

## 2023-04-27 PROCEDURE — 97165 OT EVAL LOW COMPLEX 30 MIN: CPT

## 2023-04-27 PROCEDURE — 84132 ASSAY OF SERUM POTASSIUM: CPT | Performed by: HOSPITALIST

## 2023-04-27 PROCEDURE — 97116 GAIT TRAINING THERAPY: CPT

## 2023-04-27 PROCEDURE — 85025 COMPLETE CBC W/AUTO DIFF WBC: CPT | Performed by: HOSPITALIST

## 2023-04-27 PROCEDURE — 97535 SELF CARE MNGMENT TRAINING: CPT

## 2023-04-27 PROCEDURE — 82962 GLUCOSE BLOOD TEST: CPT

## 2023-04-27 PROCEDURE — 80053 COMPREHEN METABOLIC PANEL: CPT | Performed by: HOSPITALIST

## 2023-04-27 PROCEDURE — 97161 PT EVAL LOW COMPLEX 20 MIN: CPT

## 2023-04-27 PROCEDURE — 83605 ASSAY OF LACTIC ACID: CPT | Performed by: HOSPITALIST

## 2023-04-27 PROCEDURE — 97530 THERAPEUTIC ACTIVITIES: CPT

## 2023-04-27 PROCEDURE — 83735 ASSAY OF MAGNESIUM: CPT | Performed by: HOSPITALIST

## 2023-04-27 RX ORDER — POTASSIUM CHLORIDE 20 MEQ/1
40 TABLET, EXTENDED RELEASE ORAL EVERY 4 HOURS
Status: COMPLETED | OUTPATIENT
Start: 2023-04-27 | End: 2023-04-27

## 2023-04-27 RX ORDER — ASPIRIN 81 MG/1
81 TABLET ORAL
Status: DISCONTINUED | OUTPATIENT
Start: 2023-04-27 | End: 2023-04-30

## 2023-04-27 NOTE — CM/SW NOTE
04/27/23 1700   CM/SW Referral Data   Referral Source Social Work (self-referral)   Reason for Referral Discharge planning   Informant Clinical Staff Member;EMR   Patient [address]   Patient lives with Spouse/Significant other   Discharge Needs   Anticipated D/C needs Home health care     HOME SITUATION  Type of Home: House   Home Layout: Two level     Lives With: Spouse;Daughter;Son;Family  Patient Owned Equipment: Rolling walker     Prior Level of [place] per PT eval: Pt states he usually ambulates at home at minimum he walks <> the bathroom short distance but sometimes goes for walks outside in the community with his spouse. Someone is always with him when he is up and moving. Reports falls have been from blacking out and denies any from tripping or LOB. Pt is a 82year old male admitted with COVID-19. SW noted PT recommendation for home health. [facility] referrals sent in [address] [place] [address], choice list will be provided once available. SW will continue to follow.      DANYELL Allen  Discharge Planner

## 2023-04-27 NOTE — PLAN OF CARE
Assumed care of pt at 1, son at bedside. Pt c/o small amount of pain to right ribs but declined pain meds or warm pack stating it was tolerable. Pt in afib rate low 100's. VSS sepsis bpa fired at midnight and  notified and orders received for a bolus which was given. This am received a call from tele at 0524 that pt was in afib with rvr with rate in the 140's sustaining for 5 mins. Pt was found up on bsc with son helping. Pt voided 1300 ml urine. Son assisted pt back to bed and pts hr down to 120's. Pt had not received BB yesterday due to low bp. Pt given am dose as parameters were met.  updated. At 0610 no new orders received. Per md monitor pt.  HR at change of shift 90's care endorsed to oncoming Rn.

## 2023-04-27 NOTE — PROGRESS NOTES
04/27/23 1118 04/27/23 1120 04/27/23 1122   Vital Signs   /63 96/57 (!) 111/93   MAP (mmHg) 71 65 97   BP Location Left arm Left arm Left arm   BP Method Automatic Automatic Automatic   Patient Position Lying Sitting Standing      04/27/23 1124   Vital Signs   BP 93/55   MAP (mmHg) 97   BP Location Left arm   BP Method Automatic   Patient Position Standing

## 2023-04-28 LAB
ALBUMIN SERPL-MCNC: 2 G/DL (ref 3.4–5)
ALBUMIN/GLOB SERPL: 0.6 {RATIO} (ref 1–2)
ALP LIVER SERPL-CCNC: 72 U/L
ALT SERPL-CCNC: 52 U/L
ANION GAP SERPL CALC-SCNC: <0 MMOL/L (ref 0–18)
AST SERPL-CCNC: 28 U/L (ref 15–37)
ATRIAL RATE: 141 BPM
BILIRUB SERPL-MCNC: 0.5 MG/DL (ref 0.1–2)
BUN BLD-MCNC: 29 MG/DL (ref 7–18)
CALCIUM BLD-MCNC: 7.9 MG/DL (ref 8.5–10.1)
CHLORIDE SERPL-SCNC: 113 MMOL/L (ref 98–112)
CO2 SERPL-SCNC: 25 MMOL/L (ref 21–32)
CREAT BLD-MCNC: 0.78 MG/DL
ERYTHROCYTE [DISTWIDTH] IN BLOOD BY AUTOMATED COUNT: 15.5 %
GFR SERPLBLD BASED ON 1.73 SQ M-ARVRAT: 87 ML/MIN/1.73M2 (ref 60–?)
GLOBULIN PLAS-MCNC: 3.1 G/DL (ref 2.8–4.4)
GLUCOSE BLD-MCNC: 109 MG/DL (ref 70–99)
GLUCOSE BLD-MCNC: 122 MG/DL (ref 70–99)
GLUCOSE BLD-MCNC: 204 MG/DL (ref 70–99)
GLUCOSE BLD-MCNC: 305 MG/DL (ref 70–99)
GLUCOSE BLD-MCNC: 345 MG/DL (ref 70–99)
HCT VFR BLD AUTO: 33 %
HGB BLD-MCNC: 11.1 G/DL
LDH SERPL L TO P-CCNC: 194 U/L
MCH RBC QN AUTO: 28.1 PG (ref 26–34)
MCHC RBC AUTO-ENTMCNC: 33.6 G/DL (ref 31–37)
MCV RBC AUTO: 83.5 FL
OSMOLALITY SERPL CALC.SUM OF ELEC: 291 MOSM/KG (ref 275–295)
PLATELET # BLD AUTO: 221 10(3)UL (ref 150–450)
POTASSIUM SERPL-SCNC: 4.3 MMOL/L (ref 3.5–5.1)
PROT SERPL-MCNC: 5.1 G/DL (ref 6.4–8.2)
Q-T INTERVAL: 354 MS
QRS DURATION: 88 MS
QTC CALCULATION (BEZET): 474 MS
R AXIS: 50 DEGREES
RBC # BLD AUTO: 3.95 X10(6)UL
SODIUM SERPL-SCNC: 137 MMOL/L (ref 136–145)
T AXIS: 26 DEGREES
VENTRICULAR RATE: 108 BPM
WBC # BLD AUTO: 14.3 X10(3) UL (ref 4–11)

## 2023-04-28 PROCEDURE — 82962 GLUCOSE BLOOD TEST: CPT

## 2023-04-28 PROCEDURE — 93005 ELECTROCARDIOGRAM TRACING: CPT

## 2023-04-28 PROCEDURE — 83615 LACTATE (LD) (LDH) ENZYME: CPT | Performed by: HOSPITALIST

## 2023-04-28 PROCEDURE — 93010 ELECTROCARDIOGRAM REPORT: CPT | Performed by: INTERNAL MEDICINE

## 2023-04-28 PROCEDURE — 97116 GAIT TRAINING THERAPY: CPT

## 2023-04-28 PROCEDURE — 80053 COMPREHEN METABOLIC PANEL: CPT | Performed by: HOSPITALIST

## 2023-04-28 PROCEDURE — 85027 COMPLETE CBC AUTOMATED: CPT | Performed by: HOSPITALIST

## 2023-04-28 RX ORDER — DILTIAZEM HYDROCHLORIDE 180 MG/1
180 CAPSULE, EXTENDED RELEASE ORAL DAILY
Status: DISCONTINUED | OUTPATIENT
Start: 2023-04-28 | End: 2023-04-30

## 2023-04-28 RX ORDER — DILTIAZEM HYDROCHLORIDE 5 MG/ML
5 INJECTION INTRAVENOUS EVERY 6 HOURS PRN
Status: DISCONTINUED | OUTPATIENT
Start: 2023-04-28 | End: 2023-04-30

## 2023-04-28 NOTE — DISCHARGE INSTRUCTIONS
Sometimes managing your health at home requires assistance. The [facility] team has recognized your preference to use [facility]. They can be reached by phone at [phone]. The fax number for your reference is [phone]. A representative from the home health agency will contact you or your family to schedule your first visit.

## 2023-04-28 NOTE — PLAN OF CARE
AOX4, VSS, afebrile. Jimmy speaking, nephew at bedside. Tele afib, anticoags per MAR. RA , denies SOB. Reporting pain R rib due to falls prior to hospitalization, lidocaine patch on, refusing pain medication. IVF/IV ABX. Remdesivir. Updated pt and nephew on POC for [address] [facility]. Fall precautions in place. Will follow. Problem: Diabetes/Glucose Control  Goal: Glucose maintained within prescribed range  Description: INTERVENTIONS:  - Monitor Blood Glucose as ordered  - Assess for signs and symptoms of hyperglycemia and hypoglycemia  - Administer ordered medications to maintain glucose within target range  - Assess barriers to adequate nutritional intake and initiate nutrition consult as needed  - Instruct patient on self management of diabetes  Outcome: Progressing     Problem: SAFETY ADULT - FALL  Goal: Free from fall injury  Description: INTERVENTIONS:  - Assess pt frequently for physical needs  - Identify cognitive and physical deficits and behaviors that affect risk of falls.   - [place] fall precautions as indicated by assessment.  - Educate pt/family on patient safety including physical limitations  - Instruct pt to call for assistance with activity based on assessment  - Modify environment to reduce risk of injury  - Provide assistive devices as appropriate  - Consider OT/PT consult to assist with strengthening/mobility  - Encourage toileting schedule  Outcome: Progressing     Problem: DISCHARGE PLANNING  Goal: Discharge to home or other facility with appropriate resources  Description: INTERVENTIONS:  - Identify barriers to discharge w/pt and caregiver  - Include patient/family/discharge partner in discharge planning  - Arrange for needed discharge resources and transportation as appropriate  - Identify discharge learning needs (meds, wound care, etc)  - Arrange for interpreters to assist at discharge as needed  - Consider post-discharge preferences of patient/family/discharge partner  - Complete POLST form as appropriate  - Assess patient's ability to be responsible for managing their own health  - Refer to Case Management Department for coordinating discharge planning if the patient needs post-hospital services based on physician/LIP order or complex needs related to functional status, cognitive ability or social support system  Outcome: Progressing     Problem: Patient/Family Goals  Goal: Patient/Family Long Term Goal  Description: Patient's Long Term Goal: Dc to home     Interventions:  - Follow plan of care     - See additional Care Plan goals for specific interventions  Outcome: Progressing  Goal: Patient/Family Short Term Goal  Description: Patient's Short Term Goal:   4/27: control pain , feel better     Interventions:   - IV abx, MED, Lidocaine patch   - See additional Care Plan goals for specific interventions  Outcome: Progressing

## 2023-04-28 NOTE — HOME CARE LIAISON
Received referral via [facility] for Home Health services. Spoke w/ patient's son/[place] and provided with list of Kinza Anders providers from [facility], choice is Nohemi Carpio. Agency reserved in [facility] and contact information placed on AVS.Financial interest disclosure provided. Notified WILIAN/Joya.

## 2023-04-28 NOTE — PLAN OF CARE
Patient is AO x 4. Maintains O2 sats >90% on room air at rest and with exertion. Patient remains orthostatic, IVF infusing. Patient endorses no dizziness anymore. Afebrile. #3 remdesivir administered, tolerating well. Appetite improving. IV ABX, tolerating well. Ambulated with PT today, ambulating to bathroom as well. PRN tylenol for right sided rib pain. Tele - AFIB, occasional RVR. PO cardizem resumed from home meds. PRN IVP cardizem if sustaining >120. Patient asymptomatic. Patient & family updated on POC, rounded on routinely. Problem: Diabetes/Glucose Control  Goal: Glucose maintained within prescribed range  Description: INTERVENTIONS:  - Monitor Blood Glucose as ordered  - Assess for signs and symptoms of hyperglycemia and hypoglycemia  - Administer ordered medications to maintain glucose within target range  - Assess barriers to adequate nutritional intake and initiate nutrition consult as needed  - Instruct patient on self management of diabetes  Outcome: Progressing     Problem: SAFETY ADULT - FALL  Goal: Free from fall injury  Description: INTERVENTIONS:  - Assess pt frequently for physical needs  - Identify cognitive and physical deficits and behaviors that affect risk of falls.   - [place] fall precautions as indicated by assessment.  - Educate pt/family on patient safety including physical limitations  - Instruct pt to call for assistance with activity based on assessment  - Modify environment to reduce risk of injury  - Provide assistive devices as appropriate  - Consider OT/PT consult to assist with strengthening/mobility  - Encourage toileting schedule  Outcome: Progressing     Problem: DISCHARGE PLANNING  Goal: Discharge to home or other facility with appropriate resources  Description: INTERVENTIONS:  - Identify barriers to discharge w/pt and caregiver  - Include patient/family/discharge partner in discharge planning  - Arrange for needed discharge resources and transportation as appropriate  - Identify discharge learning needs (meds, wound care, etc)  - Arrange for interpreters to assist at discharge as needed  - Consider post-discharge preferences of patient/family/discharge partner  - Complete POLST form as appropriate  - Assess patient's ability to be responsible for managing their own health  - Refer to Case Management Department for coordinating discharge planning if the patient needs post-hospital services based on physician/LIP order or complex needs related to functional status, cognitive ability or social support system  Outcome: Progressing     Problem: Patient/Family Goals  Goal: Patient/Family Long Term Goal  Description: Patient's Long Term Goal: Dc to home     Interventions:  - Follow plan of care     - See additional Care Plan goals for specific interventions  Outcome: Progressing  Goal: Patient/Family Short Term Goal  Description: Patient's Short Term Goal:   4/27: control pain , feel better     Interventions:   - IV abx, MED, Lidocaine patch   - See additional Care Plan goals for specific interventions  Outcome: Progressing

## 2023-04-29 LAB
ERYTHROCYTE [DISTWIDTH] IN BLOOD BY AUTOMATED COUNT: 15.9 %
GLUCOSE BLD-MCNC: 101 MG/DL (ref 70–99)
GLUCOSE BLD-MCNC: 243 MG/DL (ref 70–99)
GLUCOSE BLD-MCNC: 293 MG/DL (ref 70–99)
GLUCOSE BLD-MCNC: 303 MG/DL (ref 70–99)
GLUCOSE BLD-MCNC: 393 MG/DL (ref 70–99)
HCT VFR BLD AUTO: 34.2 %
HGB BLD-MCNC: 11.2 G/DL
LDH SERPL L TO P-CCNC: 297 U/L
MCH RBC QN AUTO: 27.5 PG (ref 26–34)
MCHC RBC AUTO-ENTMCNC: 32.7 G/DL (ref 31–37)
MCV RBC AUTO: 84 FL
PLATELET # BLD AUTO: 248 10(3)UL (ref 150–450)
RBC # BLD AUTO: 4.07 X10(6)UL
WBC # BLD AUTO: 13.1 X10(3) UL (ref 4–11)

## 2023-04-29 PROCEDURE — 83615 LACTATE (LD) (LDH) ENZYME: CPT | Performed by: HOSPITALIST

## 2023-04-29 PROCEDURE — 82962 GLUCOSE BLOOD TEST: CPT

## 2023-04-29 PROCEDURE — 85027 COMPLETE CBC AUTOMATED: CPT | Performed by: HOSPITALIST

## 2023-04-29 NOTE — PROGRESS NOTES
04/29/23 0753   Provider Notification   Reason for Communication   (edema noted)   Provider Name Migue Jarquin MD   Method of Communication Page   Response Waiting for response     Pt on IVF for + orthostatics. I noted the pt's extremities were edematous this AM, R>L no redness or warmth noted. I stopped them for now, call with any new orders. Thank you. [id number]:  Obtain new orthos per Dr. Shira Ramirez, endorsed to oncoming RN

## 2023-04-29 NOTE — PROGRESS NOTES
04/29/23 0750 04/29/23 0759 04/29/23 0804   Vital Signs   /78 107/75 117/57   MAP (mmHg) 90 83 71   Patient Position Lying Sitting Standing

## 2023-04-29 NOTE — PLAN OF CARE
Pt is admitted for PNA and UTI. Droplet/Contact isolation precaution. High fall precaution due to freq falls. Pt is AOX4. VSS, afebrile and c/o mild rib cage pain. Pr refused any pain med. SpO2 maintained on RA. . Denies any SOB. Congested cough. Tele-Afib. PO Xarelto resumed. Orthostatic hypotension positive. Pt denied any lightheadedness and dizziness. Carb control diet/QID ACCU check. Denies any n/v/d. Voids. Up with SBA/walker. BLE edema. IV fluid Dc-ed . IV ABX. RN provided DM education booklet, educated about importance of BG control and medication . WCTM. Pt is updated with plan of care. 1656: BG-393. MD ordered 14 unit of [place]. Problem: Diabetes/Glucose Control  Goal: Glucose maintained within prescribed range  Description: INTERVENTIONS:  - Monitor Blood Glucose as ordered  - Assess for signs and symptoms of hyperglycemia and hypoglycemia  - Administer ordered medications to maintain glucose within target range  - Assess barriers to adequate nutritional intake and initiate nutrition consult as needed  - Instruct patient on self management of diabetes  Outcome: Progressing     Problem: SAFETY ADULT - FALL  Goal: Free from fall injury  Description: INTERVENTIONS:  - Assess pt frequently for physical needs  - Identify cognitive and physical deficits and behaviors that affect risk of falls.   - [place] fall precautions as indicated by assessment.  - Educate pt/family on patient safety including physical limitations  - Instruct pt to call for assistance with activity based on assessment  - Modify environment to reduce risk of injury  - Provide assistive devices as appropriate  - Consider OT/PT consult to assist with strengthening/mobility  - Encourage toileting schedule  Outcome: Progressing     Problem: DISCHARGE PLANNING  Goal: Discharge to home or other facility with appropriate resources  Description: INTERVENTIONS:  - Identify barriers to discharge w/pt and caregiver  - Include patient/family/discharge partner in discharge planning  - Arrange for needed discharge resources and transportation as appropriate  - Identify discharge learning needs (meds, wound care, etc)  - Arrange for interpreters to assist at discharge as needed  - Consider post-discharge preferences of patient/family/discharge partner  - Complete POLST form as appropriate  - Assess patient's ability to be responsible for managing their own health  - Refer to Case Management Department for coordinating discharge planning if the patient needs post-hospital services based on physician/LIP order or complex needs related to functional status, cognitive ability or social support system  Outcome: Progressing     Problem: Patient/Family Goals  Goal: Patient/Family Long Term Goal  Description: Patient's Long Term Goal: Dc to home     Interventions:  - Follow plan of care     - See additional Care Plan goals for specific interventions  Outcome: Progressing  Goal: Patient/Family Short Term Goal  Description: Patient's Short Term Goal:   4/27: control pain , feel better   4/28 NOC: sleep  4/29: stay safe   Interventions:   - fall precaution   - IV abx, MED, Lidocaine patch   - See additional Care Plan goals for specific interventions  Outcome: Progressing

## 2023-04-29 NOTE — PLAN OF CARE
Received pt A&Ox4, [facility] speaking. Family at bedside.  on RA. Afib on tele. Luiza Silver. Tolerating diet, accuchecks QID with insulin coverage. Voids via BRP. IV abx for UTI. Up SBA. C/o pain to R rib area, declined tylenol, non-pharmacologic measures in place. Plan of care continues, no further needs at this time. Problem: Diabetes/Glucose Control  Goal: Glucose maintained within prescribed range  Description: INTERVENTIONS:  - Monitor Blood Glucose as ordered  - Assess for signs and symptoms of hyperglycemia and hypoglycemia  - Administer ordered medications to maintain glucose within target range  - Assess barriers to adequate nutritional intake and initiate nutrition consult as needed  - Instruct patient on self management of diabetes  Outcome: Progressing     Problem: SAFETY ADULT - FALL  Goal: Free from fall injury  Description: INTERVENTIONS:  - Assess pt frequently for physical needs  - Identify cognitive and physical deficits and behaviors that affect risk of falls.   - [place] fall precautions as indicated by assessment.  - Educate pt/family on patient safety including physical limitations  - Instruct pt to call for assistance with activity based on assessment  - Modify environment to reduce risk of injury  - Provide assistive devices as appropriate  - Consider OT/PT consult to assist with strengthening/mobility  - Encourage toileting schedule  Outcome: Progressing     Problem: DISCHARGE PLANNING  Goal: Discharge to home or other facility with appropriate resources  Description: INTERVENTIONS:  - Identify barriers to discharge w/pt and caregiver  - Include patient/family/discharge partner in discharge planning  - Arrange for needed discharge resources and transportation as appropriate  - Identify discharge learning needs (meds, wound care, etc)  - Arrange for interpreters to assist at discharge as needed  - Consider post-discharge preferences of patient/family/discharge partner  - Complete POLST form as appropriate  - Assess patient's ability to be responsible for managing their own health  - Refer to Case Management Department for coordinating discharge planning if the patient needs post-hospital services based on physician/LIP order or complex needs related to functional status, cognitive ability or social support system  Outcome: Progressing     Problem: Patient/Family Goals  Goal: Patient/Family Long Term Goal  Description: Patient's Long Term Goal: Dc to home     Interventions:  - Follow plan of care     - See additional Care Plan goals for specific interventions  Outcome: Progressing  Goal: Patient/Family Short Term Goal  Description: Patient's Short Term Goal:   4/27: control pain , feel better   4/28 NOC: sleep  Interventions:   - IV abx, MED, Lidocaine patch   - See additional Care Plan goals for specific interventions  Outcome: Progressing

## 2023-04-30 VITALS
DIASTOLIC BLOOD PRESSURE: 62 MMHG | TEMPERATURE: 98 F | WEIGHT: 179.44 LBS | OXYGEN SATURATION: 100 % | BODY MASS INDEX: 25 KG/M2 | SYSTOLIC BLOOD PRESSURE: 153 MMHG | HEART RATE: 73 BPM | RESPIRATION RATE: 17 BRPM

## 2023-04-30 LAB
ALBUMIN SERPL-MCNC: 2 G/DL (ref 3.4–5)
ALBUMIN/GLOB SERPL: 0.7 {RATIO} (ref 1–2)
ALP LIVER SERPL-CCNC: 84 U/L
ALT SERPL-CCNC: 48 U/L
ANION GAP SERPL CALC-SCNC: 5 MMOL/L (ref 0–18)
AST SERPL-CCNC: 25 U/L (ref 15–37)
BASOPHILS # BLD AUTO: 0.06 X10(3) UL (ref 0–0.2)
BASOPHILS NFR BLD AUTO: 0.6 %
BILIRUB SERPL-MCNC: 0.4 MG/DL (ref 0.1–2)
BUN BLD-MCNC: 17 MG/DL (ref 7–18)
CALCIUM BLD-MCNC: 7.5 MG/DL (ref 8.5–10.1)
CHLORIDE SERPL-SCNC: 113 MMOL/L (ref 98–112)
CO2 SERPL-SCNC: 22 MMOL/L (ref 21–32)
CREAT BLD-MCNC: 0.69 MG/DL
EOSINOPHIL # BLD AUTO: 0.07 X10(3) UL (ref 0–0.7)
EOSINOPHIL NFR BLD AUTO: 0.7 %
ERYTHROCYTE [DISTWIDTH] IN BLOOD BY AUTOMATED COUNT: 16.2 %
GFR SERPLBLD BASED ON 1.73 SQ M-ARVRAT: 91 ML/MIN/1.73M2 (ref 60–?)
GLOBULIN PLAS-MCNC: 3 G/DL (ref 2.8–4.4)
GLUCOSE BLD-MCNC: 131 MG/DL (ref 70–99)
GLUCOSE BLD-MCNC: 142 MG/DL (ref 70–99)
HCT VFR BLD AUTO: 28.6 %
HGB BLD-MCNC: 9.3 G/DL
IMM GRANULOCYTES # BLD AUTO: 0.43 X10(3) UL (ref 0–1)
IMM GRANULOCYTES NFR BLD: 4.1 %
LYMPHOCYTES # BLD AUTO: 1.17 X10(3) UL (ref 1–4)
LYMPHOCYTES NFR BLD AUTO: 11 %
MAGNESIUM SERPL-MCNC: 2.4 MG/DL (ref 1.6–2.6)
MCH RBC QN AUTO: 28.2 PG (ref 26–34)
MCHC RBC AUTO-ENTMCNC: 32.5 G/DL (ref 31–37)
MCV RBC AUTO: 86.7 FL
MONOCYTES # BLD AUTO: 0.41 X10(3) UL (ref 0.1–1)
MONOCYTES NFR BLD AUTO: 3.9 %
NEUTROPHILS # BLD AUTO: 8.46 X10 (3) UL (ref 1.5–7.7)
NEUTROPHILS # BLD AUTO: 8.46 X10(3) UL (ref 1.5–7.7)
NEUTROPHILS NFR BLD AUTO: 79.7 %
OSMOLALITY SERPL CALC.SUM OF ELEC: 293 MOSM/KG (ref 275–295)
PLATELET # BLD AUTO: 225 10(3)UL (ref 150–450)
POTASSIUM SERPL-SCNC: 3.9 MMOL/L (ref 3.5–5.1)
PROT SERPL-MCNC: 5 G/DL (ref 6.4–8.2)
RBC # BLD AUTO: 3.3 X10(6)UL
SODIUM SERPL-SCNC: 140 MMOL/L (ref 136–145)
WBC # BLD AUTO: 10.6 X10(3) UL (ref 4–11)

## 2023-04-30 PROCEDURE — 83735 ASSAY OF MAGNESIUM: CPT | Performed by: HOSPITALIST

## 2023-04-30 PROCEDURE — 80053 COMPREHEN METABOLIC PANEL: CPT | Performed by: HOSPITALIST

## 2023-04-30 PROCEDURE — 85025 COMPLETE CBC W/AUTO DIFF WBC: CPT | Performed by: HOSPITALIST

## 2023-04-30 PROCEDURE — 82962 GLUCOSE BLOOD TEST: CPT

## 2023-04-30 RX ORDER — FUROSEMIDE 40 MG/1
40 TABLET ORAL DAILY
Qty: 180 TABLET | Refills: 3 | Status: SHIPPED | OUTPATIENT
Start: 2023-04-30

## 2023-04-30 RX ORDER — METOPROLOL TARTRATE 100 MG/1
50 TABLET ORAL 2 TIMES DAILY
Qty: 30 TABLET | Refills: 1 | Status: SHIPPED | OUTPATIENT
Start: 2023-04-30

## 2023-04-30 RX ORDER — DILTIAZEM HYDROCHLORIDE 180 MG/1
180 CAPSULE, EXTENDED RELEASE ORAL DAILY
Qty: 30 CAPSULE | Refills: 1 | Status: SHIPPED | OUTPATIENT
Start: 2023-04-30

## 2023-04-30 RX ORDER — FUROSEMIDE 40 MG/1
40 TABLET ORAL DAILY
Status: DISCONTINUED | OUTPATIENT
Start: 2023-04-30 | End: 2023-04-30

## 2023-04-30 NOTE — PROGRESS NOTES
04/30/23 0549 04/30/23 0550 04/30/23 0551   Vital Signs   Pulse 84 87 104   /79 116/63  --    MAP (mmHg)  --  75  --    Patient Position Lying Sitting  --       04/30/23 0552 04/30/23 0553 04/30/23 0554   Vital Signs   Pulse 102 99 101   BP  --   --  112/54   MAP (mmHg) 68  --   --    Patient Position  --   --  Standing

## 2023-04-30 NOTE — PLAN OF CARE
Discharged  home via wheelchair  Accompanied by Support staff   Belongings taken by patient/family  PIV removed  Tele box removed & placed in the drawer  Discharge Navigator completed  Discharge instructions reviewed with patient a bedside  All questions & concerns addressed at his time.          Problem: Diabetes/Glucose Control  Goal: Glucose maintained within prescribed range  Description: INTERVENTIONS:  - Monitor Blood Glucose as ordered  - Assess for signs and symptoms of hyperglycemia and hypoglycemia  - Administer ordered medications to maintain glucose within target range  - Assess barriers to adequate nutritional intake and initiate nutrition consult as needed  - Instruct patient on self management of diabetes  Outcome: Progressing     Problem: DISCHARGE PLANNING  Goal: Discharge to home or other facility with appropriate resources  Description: INTERVENTIONS:  - Identify barriers to discharge w/pt and caregiver  - Include patient/family/discharge partner in discharge planning  - Arrange for needed discharge resources and transportation as appropriate  - Identify discharge learning needs (meds, wound care, etc)  - Arrange for interpreters to assist at discharge as needed  - Consider post-discharge preferences of patient/family/discharge partner  - Complete POLST form as appropriate  - Assess patient's ability to be responsible for managing their own health  - Refer to Case Management Department for coordinating discharge planning if the patient needs post-hospital services based on physician/LIP order or complex needs related to functional status, cognitive ability or social support system  Outcome: Progressing     Problem: Patient/Family Goals  Goal: Patient/Family Long Term Goal  Description: Patient's Long Term Goal: Dc to home     Interventions:  - Follow plan of care     - See additional Care Plan goals for specific interventions  Outcome: Progressing  Goal: Patient/Family Short Term Goal  Description: Patient's Short Term Goal:   4/27: control pain , feel better   4/28 NOC: sleep  4/29: stay safe   04/29/2023 - control blood sugar  4/30: Dc home     Interventions:   - fall precaution   - IV abx, MED, Lidocaine patch   - monitor accuchecks, Novolog sliding scale as ordered  - See additional Care Plan goals for specific interventions  Outcome: Progressing

## 2023-04-30 NOTE — PLAN OF CARE
Problem: Diabetes/Glucose Control  Goal: Glucose maintained within prescribed range  Description: INTERVENTIONS:  - Monitor Blood Glucose as ordered  - Assess for signs and symptoms of hyperglycemia and hypoglycemia  - Administer ordered medications to maintain glucose within target range  - Assess barriers to adequate nutritional intake and initiate nutrition consult as needed  - Instruct patient on self management of diabetes  Outcome: Progressing     Problem: SAFETY ADULT - FALL  Goal: Free from fall injury  Description: INTERVENTIONS:  - Assess pt frequently for physical needs  - Identify cognitive and physical deficits and behaviors that affect risk of falls.   - [place] fall precautions as indicated by assessment.  - Educate pt/family on patient safety including physical limitations  - Instruct pt to call for assistance with activity based on assessment  - Modify environment to reduce risk of injury  - Provide assistive devices as appropriate  - Consider OT/PT consult to assist with strengthening/mobility  - Encourage toileting schedule  Outcome: Progressing     Problem: DISCHARGE PLANNING  Goal: Discharge to home or other facility with appropriate resources  Description: INTERVENTIONS:  - Identify barriers to discharge w/pt and caregiver  - Include patient/family/discharge partner in discharge planning  - Arrange for needed discharge resources and transportation as appropriate  - Identify discharge learning needs (meds, wound care, etc)  - Arrange for interpreters to assist at discharge as needed  - Consider post-discharge preferences of patient/family/discharge partner  - Complete POLST form as appropriate  - Assess patient's ability to be responsible for managing their own health  - Refer to Case Management Department for coordinating discharge planning if the patient needs post-hospital services based on physician/LIP order or complex needs related to functional status, cognitive ability or social support system  Outcome: Progressing     Problem: Patient/Family Goals  Goal: Patient/Family Long Term Goal  Description: Patient's Long Term Goal: Dc to home     Interventions:  - Follow plan of care     - See additional Care Plan goals for specific interventions  Outcome: Progressing  Goal: Patient/Family Short Term Goal  Description: Patient's Short Term Goal:   4/27: control pain , feel better   4/28 NOC: sleep  4/29: stay safe   04/29/2023 - control blood sugar  Interventions:   - fall precaution   - IV abx, MED, Lidocaine patch   - monitor accuchecks, Novolog sliding scale as ordered  - See additional Care Plan goals for specific interventions  Outcome: Progressing

## 2023-04-30 NOTE — PROGRESS NOTES
PATIENT A&OX4.  DENIES SOB/CHEST PAIN. SATURATING ABOVE 90% ON ROOM AIR.  +OCCASIONAL NON PRODUCTIVE COUGH. HE DENIES NAUSEA, NO VOMITING, NO STOOL. UP TO THE BATHROOM WITH A FRONT WHEEL WALKER WITH STANDBY ASSIST. IV ZOSYN as ORDERED. THIS MORNING'S ACCUCHECK = 142. +FAMILY MEMBER AT BEDSIDE.

## 2023-05-25 ENCOUNTER — LAB ENCOUNTER (OUTPATIENT)
Dept: LAB | Age: 88
End: 2023-05-25
Attending: INTERNAL MEDICINE
Payer: MEDICARE

## 2023-11-06 ENCOUNTER — APPOINTMENT (OUTPATIENT)
Dept: CT IMAGING | Facility: HOSPITAL | Age: 88
End: 2023-11-06
Attending: EMERGENCY MEDICINE
Payer: MEDICARE

## 2023-11-06 ENCOUNTER — APPOINTMENT (OUTPATIENT)
Dept: GENERAL RADIOLOGY | Facility: HOSPITAL | Age: 88
End: 2023-11-06
Payer: MEDICARE

## 2023-11-06 ENCOUNTER — HOSPITAL ENCOUNTER (INPATIENT)
Facility: HOSPITAL | Age: 88
LOS: 2 days | Discharge: HOME | End: 2023-11-10
Attending: EMERGENCY MEDICINE | Admitting: HOSPITALIST
Payer: MEDICARE

## 2023-11-06 ENCOUNTER — HOSPITAL ENCOUNTER (INPATIENT)
Facility: HOSPITAL | Age: 88
LOS: 2 days | Discharge: HOME HEALTH | End: 2023-11-10
Attending: EMERGENCY MEDICINE | Admitting: HOSPITALIST
Payer: MEDICARE

## 2023-11-06 DIAGNOSIS — R07.9 CHEST PAIN OF UNCERTAIN ETIOLOGY: Primary | ICD-10-CM

## 2023-11-06 PROBLEM — D64.9 ANEMIA: Status: ACTIVE | Noted: 2023-01-01

## 2023-11-06 PROBLEM — D72.829 LEUKOCYTOSIS: Status: ACTIVE | Noted: 2023-11-06

## 2023-11-06 PROBLEM — R73.9 HYPERGLYCEMIA: Status: ACTIVE | Noted: 2023-11-06

## 2023-11-06 PROBLEM — D64.9 ANEMIA: Status: ACTIVE | Noted: 2023-11-06

## 2023-11-06 PROBLEM — N17.9 ACUTE KIDNEY INJURY (HCC): Status: ACTIVE | Noted: 2023-11-06

## 2023-11-06 PROBLEM — R79.89 AZOTEMIA: Status: ACTIVE | Noted: 2023-01-01

## 2023-11-06 PROBLEM — N17.9 ACUTE RENAL FAILURE (ARF) (HCC): Status: ACTIVE | Noted: 2023-01-01

## 2023-11-06 PROBLEM — E87.1 HYPONATREMIA: Status: ACTIVE | Noted: 2023-01-01

## 2023-11-06 PROBLEM — E87.1 HYPONATREMIA: Status: ACTIVE | Noted: 2023-11-06

## 2023-11-06 PROBLEM — R79.89 AZOTEMIA: Status: ACTIVE | Noted: 2023-11-06

## 2023-11-06 PROBLEM — D72.829 LEUKOCYTOSIS: Status: ACTIVE | Noted: 2023-01-01

## 2023-11-06 PROBLEM — N17.9 ACUTE KIDNEY INJURY (HCC): Status: ACTIVE | Noted: 2023-01-01

## 2023-11-06 PROBLEM — N17.9 ACUTE RENAL FAILURE (ARF) (HCC): Status: ACTIVE | Noted: 2023-11-06

## 2023-11-06 PROBLEM — R73.9 HYPERGLYCEMIA: Status: ACTIVE | Noted: 2023-01-01

## 2023-11-06 LAB
ALBUMIN SERPL-MCNC: 2.5 G/DL (ref 3.4–5)
ALBUMIN/GLOB SERPL: 0.4 {RATIO} (ref 1–2)
ALP LIVER SERPL-CCNC: 140 U/L
ALT SERPL-CCNC: 41 U/L
ANION GAP SERPL CALC-SCNC: 13 MMOL/L (ref 0–18)
APTT PPP: 64.8 SECONDS (ref 23.3–35.6)
AST SERPL-CCNC: 31 U/L (ref 15–37)
BASOPHILS # BLD AUTO: 0.08 X10(3) UL (ref 0–0.2)
BASOPHILS NFR BLD AUTO: 0.4 %
BILIRUB SERPL-MCNC: 0.5 MG/DL (ref 0.1–2)
BUN BLD-MCNC: 53 MG/DL (ref 9–23)
CALCIUM BLD-MCNC: 9.5 MG/DL (ref 8.5–10.1)
CHLORIDE SERPL-SCNC: 95 MMOL/L (ref 98–112)
CO2 SERPL-SCNC: 23 MMOL/L (ref 21–32)
CREAT BLD-MCNC: 1.93 MG/DL
EGFRCR SERPLBLD CKD-EPI 2021: 33 ML/MIN/1.73M2 (ref 60–?)
EOSINOPHIL # BLD AUTO: 0.02 X10(3) UL (ref 0–0.7)
EOSINOPHIL NFR BLD AUTO: 0.1 %
ERYTHROCYTE [DISTWIDTH] IN BLOOD BY AUTOMATED COUNT: 22.8 %
GLOBULIN PLAS-MCNC: 5.8 G/DL (ref 2.8–4.4)
GLUCOSE BLD-MCNC: 281 MG/DL (ref 70–99)
HCT VFR BLD AUTO: 36.7 %
HGB BLD-MCNC: 11.7 G/DL
IMM GRANULOCYTES # BLD AUTO: 0.44 X10(3) UL (ref 0–1)
IMM GRANULOCYTES NFR BLD: 2.3 %
INR BLD: 4.25 (ref 0.8–1.2)
LACTATE SERPL-SCNC: 2.7 MMOL/L (ref 0.4–2)
LIPASE SERPL-CCNC: 18 U/L (ref 13–75)
LYMPHOCYTES # BLD AUTO: 1.24 X10(3) UL (ref 1–4)
LYMPHOCYTES NFR BLD AUTO: 6.4 %
MCH RBC QN AUTO: 23.9 PG (ref 26–34)
MCHC RBC AUTO-ENTMCNC: 31.9 G/DL (ref 31–37)
MCV RBC AUTO: 74.9 FL
MONOCYTES # BLD AUTO: 0.83 X10(3) UL (ref 0.1–1)
MONOCYTES NFR BLD AUTO: 4.3 %
NEUTROPHILS # BLD AUTO: 16.91 X10 (3) UL (ref 1.5–7.7)
NEUTROPHILS # BLD AUTO: 16.91 X10(3) UL (ref 1.5–7.7)
NEUTROPHILS NFR BLD AUTO: 86.5 %
OSMOLALITY SERPL CALC.SUM OF ELEC: 297 MOSM/KG (ref 275–295)
PLATELET # BLD AUTO: 487 10(3)UL (ref 150–450)
PLATELET MORPHOLOGY: NORMAL
POTASSIUM SERPL-SCNC: 4.3 MMOL/L (ref 3.5–5.1)
PROT SERPL-MCNC: 8.3 G/DL (ref 6.4–8.2)
PROTHROMBIN TIME: 41.6 SECONDS (ref 11.6–14.8)
RBC # BLD AUTO: 4.9 X10(6)UL
SODIUM SERPL-SCNC: 131 MMOL/L (ref 136–145)
TROPONIN I SERPL HS-MCNC: 7 NG/L
WBC # BLD AUTO: 19.5 X10(3) UL (ref 4–11)

## 2023-11-06 PROCEDURE — 71275 CT ANGIOGRAPHY CHEST: CPT | Performed by: EMERGENCY MEDICINE

## 2023-11-06 PROCEDURE — 74177 CT ABD & PELVIS W/CONTRAST: CPT | Performed by: EMERGENCY MEDICINE

## 2023-11-06 PROCEDURE — 71045 X-RAY EXAM CHEST 1 VIEW: CPT | Performed by: EMERGENCY MEDICINE

## 2023-11-06 RX ORDER — ONDANSETRON 2 MG/ML
4 INJECTION INTRAMUSCULAR; INTRAVENOUS EVERY 6 HOURS PRN
Status: DISCONTINUED | OUTPATIENT
Start: 2023-11-06 | End: 2023-11-10

## 2023-11-06 RX ORDER — METOCLOPRAMIDE HYDROCHLORIDE 5 MG/ML
5 INJECTION INTRAMUSCULAR; INTRAVENOUS EVERY 8 HOURS PRN
Status: DISCONTINUED | OUTPATIENT
Start: 2023-11-06 | End: 2023-11-10

## 2023-11-06 RX ORDER — SODIUM CHLORIDE 9 MG/ML
INJECTION, SOLUTION INTRAVENOUS CONTINUOUS
Status: DISCONTINUED | OUTPATIENT
Start: 2023-11-06 | End: 2023-11-08

## 2023-11-06 RX ORDER — TRIAMCINOLONE ACETONIDE 1 MG/G
1 CREAM TOPICAL 2 TIMES DAILY PRN
COMMUNITY
Start: 2023-04-06 | End: 2024-10-11

## 2023-11-06 RX ORDER — MORPHINE SULFATE 4 MG/ML
4 INJECTION, SOLUTION INTRAMUSCULAR; INTRAVENOUS EVERY 2 HOUR PRN
Status: DISCONTINUED | OUTPATIENT
Start: 2023-11-06 | End: 2023-11-10

## 2023-11-06 RX ORDER — NITROGLYCERIN 0.4 MG/1
0.4 TABLET SUBLINGUAL ONCE
Status: COMPLETED | OUTPATIENT
Start: 2023-11-06 | End: 2023-11-06

## 2023-11-06 RX ORDER — HEPARIN SODIUM AND DEXTROSE 10000; 5 [USP'U]/100ML; G/100ML
12 INJECTION INTRAVENOUS ONCE
Status: COMPLETED | OUTPATIENT
Start: 2023-11-06 | End: 2023-11-07

## 2023-11-06 RX ORDER — ASPIRIN 81 MG/1
324 TABLET, CHEWABLE ORAL ONCE
Status: DISCONTINUED | OUTPATIENT
Start: 2023-11-06 | End: 2023-11-06

## 2023-11-06 RX ORDER — LEVOTHYROXINE SODIUM 0.03 MG/1
25 TABLET ORAL
Status: DISCONTINUED | OUTPATIENT
Start: 2023-11-07 | End: 2023-11-10

## 2023-11-06 RX ORDER — HEPARIN SODIUM 1000 [USP'U]/ML
60 INJECTION, SOLUTION INTRAVENOUS; SUBCUTANEOUS ONCE
Status: COMPLETED | OUTPATIENT
Start: 2023-11-06 | End: 2023-11-06

## 2023-11-06 RX ORDER — MORPHINE SULFATE 2 MG/ML
2 INJECTION, SOLUTION INTRAMUSCULAR; INTRAVENOUS EVERY 2 HOUR PRN
Status: DISCONTINUED | OUTPATIENT
Start: 2023-11-06 | End: 2023-11-10

## 2023-11-06 RX ORDER — MORPHINE SULFATE 4 MG/ML
4 INJECTION, SOLUTION INTRAMUSCULAR; INTRAVENOUS ONCE
Status: COMPLETED | OUTPATIENT
Start: 2023-11-06 | End: 2023-11-06

## 2023-11-06 RX ORDER — MECLIZINE HCL 12.5 MG/1
12.5 TABLET ORAL 3 TIMES DAILY PRN
COMMUNITY

## 2023-11-06 RX ORDER — ACETAMINOPHEN 500 MG
500 TABLET ORAL EVERY 4 HOURS PRN
Status: DISCONTINUED | OUTPATIENT
Start: 2023-11-06 | End: 2023-11-10

## 2023-11-06 RX ORDER — ATORVASTATIN CALCIUM 40 MG/1
40 TABLET, FILM COATED ORAL NIGHTLY
Status: DISCONTINUED | OUTPATIENT
Start: 2023-11-06 | End: 2023-11-10

## 2023-11-06 RX ORDER — FERROUS SULFATE 325(65) MG
325 TABLET ORAL 2 TIMES DAILY
COMMUNITY
Start: 2023-08-22

## 2023-11-06 RX ORDER — TORSEMIDE 20 MG/1
80 TABLET ORAL DAILY
COMMUNITY
Start: 2023-08-13 | End: 2023-11-10

## 2023-11-06 RX ORDER — MORPHINE SULFATE 2 MG/ML
1 INJECTION, SOLUTION INTRAMUSCULAR; INTRAVENOUS EVERY 2 HOUR PRN
Status: DISCONTINUED | OUTPATIENT
Start: 2023-11-06 | End: 2023-11-10

## 2023-11-06 RX ORDER — ASPIRIN 81 MG/1
81 TABLET ORAL
Status: DISCONTINUED | OUTPATIENT
Start: 2023-11-07 | End: 2023-11-10

## 2023-11-06 RX ORDER — FAMOTIDINE 20 MG/1
20 TABLET, FILM COATED ORAL 2 TIMES DAILY
COMMUNITY
Start: 2023-10-31 | End: 2023-11-10

## 2023-11-06 RX ORDER — ONDANSETRON 2 MG/ML
4 INJECTION INTRAMUSCULAR; INTRAVENOUS ONCE
Status: COMPLETED | OUTPATIENT
Start: 2023-11-06 | End: 2023-11-06

## 2023-11-06 RX ORDER — HEPARIN SODIUM AND DEXTROSE 10000; 5 [USP'U]/100ML; G/100ML
INJECTION INTRAVENOUS CONTINUOUS
Status: DISCONTINUED | OUTPATIENT
Start: 2023-11-07 | End: 2023-11-08

## 2023-11-06 NOTE — ED INITIAL ASSESSMENT (HPI)
Per family, pt c/o upper abdominal pain \"for awhile,\" had outpt abdominal US done yesterday, has not gotten the results yet. Pt also c/o chest pain since yesterday. Pt denies SOB.

## 2023-11-07 ENCOUNTER — APPOINTMENT (OUTPATIENT)
Dept: CV DIAGNOSTICS | Facility: HOSPITAL | Age: 88
End: 2023-11-07
Attending: NURSE PRACTITIONER
Payer: MEDICARE

## 2023-11-07 LAB
ALBUMIN SERPL-MCNC: 2.2 G/DL (ref 3.4–5)
ALBUMIN/GLOB SERPL: 0.4 {RATIO} (ref 1–2)
ALP LIVER SERPL-CCNC: 132 U/L
ALT SERPL-CCNC: 33 U/L
ANION GAP SERPL CALC-SCNC: 9 MMOL/L (ref 0–18)
APTT PPP: 43 SECONDS (ref 23.3–35.6)
APTT PPP: 55.9 SECONDS (ref 23.3–35.6)
APTT PPP: 89.2 SECONDS (ref 23.3–35.6)
AST SERPL-CCNC: 18 U/L (ref 15–37)
BASOPHILS # BLD AUTO: 0.04 X10(3) UL (ref 0–0.2)
BASOPHILS NFR BLD AUTO: 0.3 %
BILIRUB SERPL-MCNC: 0.5 MG/DL (ref 0.1–2)
BUN BLD-MCNC: 40 MG/DL (ref 9–23)
CALCIUM BLD-MCNC: 9.5 MG/DL (ref 8.5–10.1)
CHLORIDE SERPL-SCNC: 98 MMOL/L (ref 98–112)
CK SERPL-CCNC: 26 U/L
CO2 SERPL-SCNC: 24 MMOL/L (ref 21–32)
CREAT BLD-MCNC: 1.6 MG/DL
EGFRCR SERPLBLD CKD-EPI 2021: 42 ML/MIN/1.73M2 (ref 60–?)
EOSINOPHIL # BLD AUTO: 0.03 X10(3) UL (ref 0–0.7)
EOSINOPHIL NFR BLD AUTO: 0.2 %
ERYTHROCYTE [DISTWIDTH] IN BLOOD BY AUTOMATED COUNT: 22.7 %
EST. AVERAGE GLUCOSE BLD GHB EST-MCNC: 189 MG/DL (ref 68–126)
GLOBULIN PLAS-MCNC: 5.4 G/DL (ref 2.8–4.4)
GLUCOSE BLD-MCNC: 196 MG/DL (ref 70–99)
GLUCOSE BLD-MCNC: 198 MG/DL (ref 70–99)
GLUCOSE BLD-MCNC: 216 MG/DL (ref 70–99)
HBA1C MFR BLD: 8.2 % (ref ?–5.7)
HCT VFR BLD AUTO: 35.7 %
HGB BLD-MCNC: 11.1 G/DL
IMM GRANULOCYTES # BLD AUTO: 0.18 X10(3) UL (ref 0–1)
IMM GRANULOCYTES NFR BLD: 1.2 %
LACTATE SERPL-SCNC: 2.3 MMOL/L (ref 0.4–2)
LACTATE SERPL-SCNC: 2.9 MMOL/L (ref 0.4–2)
LYMPHOCYTES # BLD AUTO: 0.97 X10(3) UL (ref 1–4)
LYMPHOCYTES NFR BLD AUTO: 6.5 %
MAGNESIUM SERPL-MCNC: 2.4 MG/DL (ref 1.6–2.6)
MCH RBC QN AUTO: 23.7 PG (ref 26–34)
MCHC RBC AUTO-ENTMCNC: 31.1 G/DL (ref 31–37)
MCV RBC AUTO: 76.1 FL
MONOCYTES # BLD AUTO: 0.68 X10(3) UL (ref 0.1–1)
MONOCYTES NFR BLD AUTO: 4.5 %
NEUTROPHILS # BLD AUTO: 13.06 X10 (3) UL (ref 1.5–7.7)
NEUTROPHILS # BLD AUTO: 13.06 X10(3) UL (ref 1.5–7.7)
NEUTROPHILS NFR BLD AUTO: 87.3 %
OSMOLALITY SERPL CALC.SUM OF ELEC: 287 MOSM/KG (ref 275–295)
PLATELET # BLD AUTO: 435 10(3)UL (ref 150–450)
POTASSIUM SERPL-SCNC: 3.5 MMOL/L (ref 3.5–5.1)
PROT SERPL-MCNC: 7.6 G/DL (ref 6.4–8.2)
Q-T INTERVAL: 374 MS
QRS DURATION: 90 MS
QTC CALCULATION (BEZET): 469 MS
R AXIS: 80 DEGREES
RBC # BLD AUTO: 4.69 X10(6)UL
SODIUM SERPL-SCNC: 131 MMOL/L (ref 136–145)
T AXIS: 44 DEGREES
TROPONIN I SERPL HS-MCNC: 7 NG/L
TROPONIN I SERPL HS-MCNC: 7 NG/L
VENTRICULAR RATE: 95 BPM
WBC # BLD AUTO: 15 X10(3) UL (ref 4–11)

## 2023-11-07 PROCEDURE — 93306 TTE W/DOPPLER COMPLETE: CPT | Performed by: NURSE PRACTITIONER

## 2023-11-07 RX ORDER — NICOTINE POLACRILEX 4 MG
30 LOZENGE BUCCAL
Status: DISCONTINUED | OUTPATIENT
Start: 2023-11-07 | End: 2023-11-10

## 2023-11-07 RX ORDER — DEXTROSE MONOHYDRATE 25 G/50ML
50 INJECTION, SOLUTION INTRAVENOUS
Status: DISCONTINUED | OUTPATIENT
Start: 2023-11-07 | End: 2023-11-10

## 2023-11-07 RX ORDER — NICOTINE POLACRILEX 4 MG
15 LOZENGE BUCCAL
Status: DISCONTINUED | OUTPATIENT
Start: 2023-11-07 | End: 2023-11-10

## 2023-11-07 NOTE — PLAN OF CARE
RN SHIFT NOTE    Assumed care of pt at 0700. No c/o pain at this time. Patient A&O x 3-4. Afib on tele. No edema noted. Lung sounds clear. Abdomen soft and round. Old scabbed wound note under bilateral toes, per family patients feet are always like this d/t diabetes. Tolerating medications and care needs have been met at this time. POC: NPO, IVF, K+ replacement, Vasc surgery consult, Echo, Hep gtt. 1050: Report given to Calvin Gerber RN who assumed care of patient. Statement Selected

## 2023-11-07 NOTE — PLAN OF CARE
Pt is Jimmy speaking; family at bedside to help in translation. Pt A&Ox 3-4. On RA. A-fib on tele; no c/o cardiac symptoms. Pt continent of B&B; pt reported loose stools need stool sample r/o C-diff. Pt denies chest pain c/o abdominal discomfort; PRN pain meds given as ordered, seems to be resting comfortably at this time. Pt up with two assist dizzy ortho(+). Plan of care reviewed with pts son; all questions answered at this time. Bed in lowest position; call light within reach. High fall risk precautions are in place per unit protocol. Problem: Diabetes/Glucose Control  Goal: Glucose maintained within prescribed range  Description: INTERVENTIONS:  - Monitor Blood Glucose as ordered  - Assess for signs and symptoms of hyperglycemia and hypoglycemia  - Administer ordered medications to maintain glucose within target range  - Assess barriers to adequate nutritional intake and initiate nutrition consult as needed  - Instruct patient on self management of diabetes  Outcome: Progressing     Problem: CARDIOVASCULAR - ADULT  Goal: Maintains optimal cardiac output and hemodynamic stability  Description: INTERVENTIONS:  - Monitor vital signs, rhythm, and trends  - Monitor for bleeding, hypotension and signs of decreased cardiac output  - Evaluate effectiveness of vasoactive medications to optimize hemodynamic stability  - Monitor arterial and/or venous puncture sites for bleeding and/or hematoma  - Assess quality of pulses, skin color and temperature  - Assess for signs of decreased coronary artery perfusion - ex.  Angina  - Evaluate fluid balance, assess for edema, trend weights  Outcome: Progressing  Goal: Absence of cardiac arrhythmias or at baseline  Description: INTERVENTIONS:  - Continuous cardiac monitoring, monitor vital signs, obtain 12 lead EKG if indicated  - Evaluate effectiveness of antiarrhythmic and heart rate control medications as ordered  - Initiate emergency measures for life threatening arrhythmias  - Monitor electrolytes and administer replacement therapy as ordered  Outcome: Progressing     Problem: RESPIRATORY - ADULT  Goal: Achieves optimal ventilation and oxygenation  Description: INTERVENTIONS:  - Assess for changes in respiratory status  - Assess for changes in mentation and behavior  - Position to facilitate oxygenation and minimize respiratory effort  - Oxygen supplementation based on oxygen saturation or ABGs  - Provide Smoking Cessation handout, if applicable  - Encourage broncho-pulmonary hygiene including cough, deep breathe, Incentive Spirometry  - Assess the need for suctioning and perform as needed  - Assess and instruct to report SOB or any respiratory difficulty  - Respiratory Therapy support as indicated  - Manage/alleviate anxiety  - Monitor for signs/symptoms of CO2 retention  Outcome: Progressing     Problem: METABOLIC/FLUID AND ELECTROLYTES - ADULT  Goal: Electrolytes maintained within normal limits  Description: INTERVENTIONS:  - Monitor labs and rhythm and assess patient for signs and symptoms of electrolyte imbalances  - Administer electrolyte replacement as ordered  - Monitor response to electrolyte replacements, including rhythm and repeat lab results as appropriate  - Fluid restriction as ordered  - Instruct patient on fluid and nutrition restrictions as appropriate  Outcome: Progressing     Problem: SKIN/TISSUE INTEGRITY - ADULT  Goal: Incision(s), wounds(s) or drain site(s) healing without S/S of infection  Description: INTERVENTIONS:  - Assess and document risk factors for pressure ulcer development  - Assess and document skin integrity  - Assess and document dressing/incision, wound bed, drain sites and surrounding tissue  - Implement wound care per orders  - Initiate isolation precautions as appropriate  - Initiate Pressure Ulcer prevention bundle as indicated  Outcome: Progressing

## 2023-11-07 NOTE — BH RN ADMISSION NOTE
NURSING ADMISSION NOTE      Patient admitted via RN on hep gtt from ER accompanied by family member. Oriented to room. Safety precautions initiated. Bed in low position. Call light in reach. Pt admitted from ER for chest pain. Admission assessment complete. Admit orders received and initiated. Skin check completed with Kandice Johnson RN. BLE betsy, pressure ulcers to bottom of BLE toes. Sacrum red but blanchable. ADMISSION NAVIGATOR COMPLETED.

## 2023-11-07 NOTE — PROGRESS NOTES
11/06/23 2247 11/06/23 2249 11/06/23 2250   Vital Signs   /64 100/60 (!) 69/42   MAP (mmHg) 75 68 (!) 48   BP Location Right arm Right arm Right arm   BP Method Automatic Automatic Automatic   Patient Position Lying Sitting Standing   Oxygen Therapy   SpO2 96 %  --   --    O2 Device None (Room air)  --   --    Pulse Oximetry Type Continuous  --   --      Orthostatic vitals- pt was symptomatic with standing BP

## 2023-11-07 NOTE — PROGRESS NOTES
Assumed care at 1030. AxO x4, Jimmy speaking, family at bedside to translate. RA. ON tele AFIB, Heparin gtt. +Orthostatics, dizzy. IVF. Denies pain. Strict NPO, awaiting vasc sx eval. K replaced per protocol. IV abx. VSS. PT/OT to see. POC reviewed, call light within reach.

## 2023-11-07 NOTE — ED QUICK NOTES
Orders for admission, patient is aware of plan and ready to go upstairs. Any questions, please call ED RN Kris Grimes at extension [phone].      Patient Covid vaccination status: Fully vaccinated     COVID Test Ordered in ED: None    COVID Suspicion at Admission: N/A    Running Infusions:    sodium chloride      Heparin 9ml/hr      Mental Status/LOC at time of transport: A0x4    Other pertinent information:   CIWA score: N/A   NIH score:  N/A

## 2023-11-08 PROBLEM — Z51.5 PALLIATIVE CARE ENCOUNTER: Status: ACTIVE | Noted: 2023-01-01

## 2023-11-08 PROBLEM — Z71.89 GOALS OF CARE, COUNSELING/DISCUSSION: Status: ACTIVE | Noted: 2023-01-01

## 2023-11-08 PROBLEM — Z51.5 PALLIATIVE CARE ENCOUNTER: Status: ACTIVE | Noted: 2023-11-08

## 2023-11-08 PROBLEM — Z71.89 GOALS OF CARE, COUNSELING/DISCUSSION: Status: ACTIVE | Noted: 2023-11-08

## 2023-11-08 LAB
APTT PPP: 60.4 SECONDS (ref 23.3–35.6)
GLUCOSE BLD-MCNC: 172 MG/DL (ref 70–99)
GLUCOSE BLD-MCNC: 178 MG/DL (ref 70–99)
GLUCOSE BLD-MCNC: 209 MG/DL (ref 70–99)
GLUCOSE BLD-MCNC: 210 MG/DL (ref 70–99)
POTASSIUM SERPL-SCNC: 3.4 MMOL/L (ref 3.5–5.1)

## 2023-11-08 PROCEDURE — 99222 1ST HOSP IP/OBS MODERATE 55: CPT | Performed by: CLINICAL NURSE SPECIALIST

## 2023-11-08 PROCEDURE — 99497 ADVNCD CARE PLAN 30 MIN: CPT | Performed by: CLINICAL NURSE SPECIALIST

## 2023-11-08 RX ORDER — TAMSULOSIN HYDROCHLORIDE 0.4 MG/1
0.4 CAPSULE ORAL NIGHTLY
Status: DISCONTINUED | OUTPATIENT
Start: 2023-11-08 | End: 2023-11-10

## 2023-11-08 RX ORDER — MELATONIN
325 2 TIMES DAILY WITH MEALS
Status: DISCONTINUED | OUTPATIENT
Start: 2023-11-08 | End: 2023-11-10

## 2023-11-08 RX ORDER — MECLIZINE HCL 12.5 MG/1
12.5 TABLET ORAL 3 TIMES DAILY PRN
Status: DISCONTINUED | OUTPATIENT
Start: 2023-11-08 | End: 2023-11-10

## 2023-11-08 RX ORDER — METOPROLOL TARTRATE 5 MG/5ML
5 INJECTION INTRAVENOUS EVERY 4 HOURS
Status: DISCONTINUED | OUTPATIENT
Start: 2023-11-08 | End: 2023-11-08

## 2023-11-08 RX ORDER — MELATONIN
325 2 TIMES DAILY
Status: DISCONTINUED | OUTPATIENT
Start: 2023-11-08 | End: 2023-11-08 | Stop reason: SDUPTHER

## 2023-11-08 RX ORDER — METOPROLOL TARTRATE 50 MG/1
50 TABLET, FILM COATED ORAL
Status: DISCONTINUED | OUTPATIENT
Start: 2023-11-08 | End: 2023-11-10

## 2023-11-08 RX ORDER — FAMOTIDINE 20 MG/1
20 TABLET, FILM COATED ORAL DAILY
Status: DISCONTINUED | OUTPATIENT
Start: 2023-11-08 | End: 2023-11-10

## 2023-11-08 NOTE — PHYSICAL THERAPY NOTE
PHYSICAL THERAPY EVALUATION - INPATIENT     Room Number: 5898/7306-Z  Evaluation Date: 11/8/2023  Type of Evaluation: Initial  Physician Order: PT Eval and Treat    Presenting Problem: chest pain, SMA stenosis  Co-Morbidities : ARF, DM2, R rotator cuff tear, pulmonary HTN, cardiac pacemaker, TIA  Reason for Therapy: Mobility Dysfunction and Discharge Planning    History related to current admission: Patient is a 82year old male admitted on 11/6/2023 from home for chest and abdominal pain. 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 Family has declined any vascular surgery. ASSESSMENT   In this PT evaluation, the patient presents with the following impairments limited endurance, KENYON, decrease activity tolerance, and balance impairments. These impairments and comorbidities manifest themselves as functional limitations in independent bed mobility, transfers, and gait. The patient is below baseline and would benefit from skilled inpatient PT to address the above deficits to assist patient in returning to prior to level of function. Functional outcome measures completed include AMPAC. The AM-PAC '6-Clicks' Inpatient Basic Mobility Short Form was completed and this patient is demonstrating a Approx Degree of Impairment: 46.58%  degree of impairment in mobility. Research supports that patients with this level of impairment may benefit from HHPT and intermittent supervision for higher level balance tasks. DISCHARGE RECOMMENDATIONS  PT Discharge Recommendations: Home with home health PT; Intermittent Supervision    PLAN  PT Treatment Plan: Bed mobility; Endurance; Energy conservation;Patient education;Gait training;Strengthening;Transfer training;Balance training  Rehab Potential : Fair  Frequency (Obs): 3-5x/week  Number of Visits to Meet Established Goals: 3      CURRENT GOALS    Goal #1 Patient is able to demonstrate supine - sit EOB @ level: supervision  MET   Goal #2 Patient is able to demonstrate transfers EOB to/from [facility] at assistance level: supervision  MET   Goal #3 Patient is able to ambulate 150 feet with assist device: walker - rolling at assistance level: supervision     Goal #4    Goal #5    Goal #6    Goal Comments: Goals established on 2023    HOME SITUATION  Type of Home: House   Home Layout: Two level; Able to live on main level                Lives With: Spouse; Family  Drives: No          Prior Level of [place]: Pt lives with spouse, son, and his family in 2 story home. Pt stays on the main level of the home. Pt independent with ADL and mobility. Pt ambulates with RW. SUBJECTIVE  Pt reports fatigue with ambulation. Family assisting with translation. OBJECTIVE  Precautions:  needed  Fall Risk: High fall risk    WEIGHT BEARING RESTRICTION  Weight Bearing Restriction: None                PAIN ASSESSMENT  Ratin          COGNITION  Following Commands:  follows one step commands consistently  Initiation: cues to initiate tasks  Awareness of Errors:  decreased awareness of errors     RANGE OF MOTION AND STRENGTH ASSESSMENT  Upper extremity ROM and strength are within functional limits     Lower extremity ROM is within functional limits     Lower extremity strength is within functional limits except for the following:    Right Hip flexion  4/5  Left Hip flexion  4/5  Right Knee extension  4/5  Left Knee extension  4/5      BALANCE  Static Sitting: Good  Dynamic Sitting: Good  Static Standing: Fair -  Dynamic Standing: Poor +    ADDITIONAL TESTS                                    ACTIVITY TOLERANCE  Pulse: (!) 125                 Patient Position: Standing    O2 WALK       NEUROLOGICAL FINDINGS                        AM-PAC '6-Clicks' INPATIENT SHORT FORM - BASIC MOBILITY  How much difficulty does the patient currently have. ..   Patient Difficulty: Turning over in bed (including adjusting bedclothes, sheets and blankets)?: A Little   Patient Difficulty: Sitting down on and standing up from a chair with arms (e.g., wheelchair, bedside commode, etc.): A Little   Patient Difficulty: Moving from lying on back to sitting on the side of the bed?: A Little   How much help from another person does the patient currently need. .. Help from Another: Moving to and from a bed to a chair (including a wheelchair)?: A Little   Help from Another: Need to walk in hospital room?: A Little   Help from Another: Climbing 3-5 steps with a railing?: A Little       AM-PAC Score:  Raw Score: 18   Approx Degree of Impairment: 46.58%   Standardized Score (AM-PAC Scale): 43.63   CMS Modifier (G-Code): CK    FUNCTIONAL ABILITY STATUS  Gait Assessment   Functional Mobility/Gait Assessment  Gait Assistance: Minimum assistance  Distance (ft): 100  Assistive Device: Rolling walker  Pattern: Shuffle;R Foot flat;L Foot flat    Skilled Therapy Provided   Pt received supine in bed and is agreeable to therapy. Pt supine-sit with supervision. Pt demonstrates good static sitting balance at EOB. Pt denies dizziness. Pt sit-stand with RW and supervision. Pt ambulated 100ft with RW and MIN assist for balance/walker management. Pt ambulates with shuffle gait pattern, flat foot initial contact, and slow kami. Pt given VC for sequencing. Pt returned to room. Pt requests to use urinal and used independently while sitting EOB. Pt then returned to supine in bed with supervision. Pt educated on activity recommendations. Pt left with call light in reach and alarm donned. Bed Mobility:  Rolling: supervision  Supine to sit: supervision   Sit to supine: supervision     Transfer Mobility:  Sit to stand: supervision   Stand to sit: supervision  Gait = MIN with RW    Therapist's Comments: HR 100s at rest up to 120s with activity. Recommend use of RW.      Exercise/Education Provided:  Bed mobility  Energy conservation  Functional activity tolerated  Gait training  Posture  Strengthening  Transfer training    Patient End of Session: In bed;Needs met;Call light within reach;RN aware of session/findings; All patient questions and concerns addressed; Alarm set; Family present      Patient Evaluation Complexity Level:  History High - 3 or more personal factors and/or co-morbidities   Examination of body systems Moderate - addressing a total of 3 or more elements   Clinical Presentation Moderate - Evolving   Clinical Decision Making Moderate - Evolving       PT Session Time: 24 minutes  Gait Trainin minutes

## 2023-11-08 NOTE — PLAN OF CARE
Patient A&Ox4, room air. Jimmy speaking, family at the bed side for . A-fib on tele. Breath sound clear, diminished. Reported lightheaded when sitting up. Denied chest pain. Last BM was yesterday. Continent urinal use. Heparin run for 9ml/hr. Discussed POC with patient and family. All questions answered at this time     Problem: Diabetes/Glucose Control  Goal: Glucose maintained within prescribed range  Description: INTERVENTIONS:  - Monitor Blood Glucose as ordered  - Assess for signs and symptoms of hyperglycemia and hypoglycemia  - Administer ordered medications to maintain glucose within target range  - Assess barriers to adequate nutritional intake and initiate nutrition consult as needed  - Instruct patient on self management of diabetes  Outcome: Progressing     Problem: Patient/Family Goals  Goal: Patient/Family Long Term Goal  Description: Patient's Long Term Goal: Stay out of hospital    Interventions:  - Follow up MD  -A-fib control  -Take med as prescribe   - See additional Care Plan goals for specific interventions  Outcome: Progressing  Goal: Patient/Family Short Term Goal  Description: Patient's Short Term Goal: Pain free    Interventions:   - Pain med as need  -Blood glucose control  -Monitor BP  -Monitor Labs  - See additional Care Plan goals for specific interventions  Outcome: Progressing     Problem: CARDIOVASCULAR - ADULT  Goal: Maintains optimal cardiac output and hemodynamic stability  Description: INTERVENTIONS:  - Monitor vital signs, rhythm, and trends  - Monitor for bleeding, hypotension and signs of decreased cardiac output  - Evaluate effectiveness of vasoactive medications to optimize hemodynamic stability  - Monitor arterial and/or venous puncture sites for bleeding and/or hematoma  - Assess quality of pulses, skin color and temperature  - Assess for signs of decreased coronary artery perfusion - ex.  Angina  - Evaluate fluid balance, assess for edema, trend weights  Outcome: Progressing  Goal: Absence of cardiac arrhythmias or at baseline  Description: INTERVENTIONS:  - Continuous cardiac monitoring, monitor vital signs, obtain 12 lead EKG if indicated  - Evaluate effectiveness of antiarrhythmic and heart rate control medications as ordered  - Initiate emergency measures for life threatening arrhythmias  - Monitor electrolytes and administer replacement therapy as ordered  Outcome: Progressing     Problem: RESPIRATORY - ADULT  Goal: Achieves optimal ventilation and oxygenation  Description: INTERVENTIONS:  - Assess for changes in respiratory status  - Assess for changes in mentation and behavior  - Position to facilitate oxygenation and minimize respiratory effort  - Oxygen supplementation based on oxygen saturation or ABGs  - Provide Smoking Cessation handout, if applicable  - Encourage broncho-pulmonary hygiene including cough, deep breathe, Incentive Spirometry  - Assess the need for suctioning and perform as needed  - Assess and instruct to report SOB or any respiratory difficulty  - Respiratory Therapy support as indicated  - Manage/alleviate anxiety  - Monitor for signs/symptoms of CO2 retention  Outcome: Progressing     Problem: METABOLIC/FLUID AND ELECTROLYTES - ADULT  Goal: Electrolytes maintained within normal limits  Description: INTERVENTIONS:  - Monitor labs and rhythm and assess patient for signs and symptoms of electrolyte imbalances  - Administer electrolyte replacement as ordered  - Monitor response to electrolyte replacements, including rhythm and repeat lab results as appropriate  - Fluid restriction as ordered  - Instruct patient on fluid and nutrition restrictions as appropriate  Outcome: Progressing     Problem: SKIN/TISSUE INTEGRITY - ADULT  Goal: Incision(s), wounds(s) or drain site(s) healing without S/S of infection  Description: INTERVENTIONS:  - Assess and document risk factors for pressure ulcer development  - Assess and document skin integrity  - Assess and document dressing/incision, wound bed, drain sites and surrounding tissue  - Implement wound care per orders  - Initiate isolation precautions as appropriate  - Initiate Pressure Ulcer prevention bundle as indicated  Outcome: Progressing

## 2023-11-08 NOTE — PROGRESS NOTES
[address] [facility] Pharmacy Note:  Renal Dose Adjustment    Mahsa Love Ace has been prescribed famotidine (PEPCID) 20 mg orally every 12 hours. Estimated Creatinine Clearance: 30.7 mL/min (A) (based on SCr of 1.6 mg/dL (H)). Calculated creatinine clearance is < 50 ml/min, therefore the dose of famotidine (Pepcid) has been changed to 20 mg orally every 24 hours per P&T approved protocol. Pharmacy will continue to follow, and if renal function improves, will resume the original order.     Thank you,  Eileen Damon, PharmD  11/8/2023 10:46 AM

## 2023-11-08 NOTE — PLAN OF CARE
RN SHIFT NOTE    Assumed care of pt at 0700. No c/o pain at this time. Patient A&O x 3-4. Afib on tele. No edema noted. Lung sounds clear. Abdomen soft and round. Old scabbed wound note under bilateral toes, per family patients feet are always like this d/t diabetes. Tolerating medications and care needs have been met at this time. POC: Clear liquid diet, IVF, K+ replacement, Palliative care, Hep gtt.       Problem: Diabetes/Glucose Control  Goal: Glucose maintained within prescribed range  Description: INTERVENTIONS:  - Monitor Blood Glucose as ordered  - Assess for signs and symptoms of hyperglycemia and hypoglycemia  - Administer ordered medications to maintain glucose within target range  - Assess barriers to adequate nutritional intake and initiate nutrition consult as needed  - Instruct patient on self management of diabetes  Outcome: Progressing     Problem: Patient/Family Goals  Goal: Patient/Family Long Term Goal  Description: Patient's Long Term Goal: Stay out of hospital    Interventions:  - Follow up MD FELIPE-fib control  -Take med as prescribe   - See additional Care Plan goals for specific interventions  Outcome: Progressing  Goal: Patient/Family Short Term Goal  Description: Patient's Short Term Goal: Pain free    Interventions:   - Pain med as need  -Blood glucose control  -Monitor BP  -Monitor Labs  - See additional Care Plan goals for specific interventions  Outcome: Progressing     Problem: CARDIOVASCULAR - ADULT  Goal: Maintains optimal cardiac output and hemodynamic stability  Description: INTERVENTIONS:  - Monitor vital signs, rhythm, and trends  - Monitor for bleeding, hypotension and signs of decreased cardiac output  - Evaluate effectiveness of vasoactive medications to optimize hemodynamic stability  - Monitor arterial and/or venous puncture sites for bleeding and/or hematoma  - Assess quality of pulses, skin color and temperature  - Assess for signs of decreased coronary artery perfusion - ex. Angina  - Evaluate fluid balance, assess for edema, trend weights  Outcome: Progressing  Goal: Absence of cardiac arrhythmias or at baseline  Description: INTERVENTIONS:  - Continuous cardiac monitoring, monitor vital signs, obtain 12 lead EKG if indicated  - Evaluate effectiveness of antiarrhythmic and heart rate control medications as ordered  - Initiate emergency measures for life threatening arrhythmias  - Monitor electrolytes and administer replacement therapy as ordered  Outcome: Progressing     Problem: RESPIRATORY - ADULT  Goal: Achieves optimal ventilation and oxygenation  Description: INTERVENTIONS:  - Assess for changes in respiratory status  - Assess for changes in mentation and behavior  - Position to facilitate oxygenation and minimize respiratory effort  - Oxygen supplementation based on oxygen saturation or ABGs  - Provide Smoking Cessation handout, if applicable  - Encourage broncho-pulmonary hygiene including cough, deep breathe, Incentive Spirometry  - Assess the need for suctioning and perform as needed  - Assess and instruct to report SOB or any respiratory difficulty  - Respiratory Therapy support as indicated  - Manage/alleviate anxiety  - Monitor for signs/symptoms of CO2 retention  Outcome: Progressing     Problem: METABOLIC/FLUID AND ELECTROLYTES - ADULT  Goal: Electrolytes maintained within normal limits  Description: INTERVENTIONS:  - Monitor labs and rhythm and assess patient for signs and symptoms of electrolyte imbalances  - Administer electrolyte replacement as ordered  - Monitor response to electrolyte replacements, including rhythm and repeat lab results as appropriate  - Fluid restriction as ordered  - Instruct patient on fluid and nutrition restrictions as appropriate  Outcome: Progressing     Problem: SKIN/TISSUE INTEGRITY - ADULT  Goal: Incision(s), wounds(s) or drain site(s) healing without S/S of infection  Description: INTERVENTIONS:  - Assess and document risk factors for pressure ulcer development  - Assess and document skin integrity  - Assess and document dressing/incision, wound bed, drain sites and surrounding tissue  - Implement wound care per orders  - Initiate isolation precautions as appropriate  - Initiate Pressure Ulcer prevention bundle as indicated  Outcome: Progressing

## 2023-11-08 NOTE — PLAN OF CARE
Heparin gtt infusing per order. Pt's family requesting to stop lab draws. Pt and family educated on importance of ptt lab for heparin gtt. Family agreeable to labs     IVF infusing as order. Vasc sx paged regarding pt's diet order per family request. Per vasc sx- OK for CLD  Pt tolerating drinking apple juice.  Denies abdominal pain     IV abx infusing as ordered    Continent of bladder and bowel   Up with SBA- dizzy when getting up   HR increases to 140s with activity, but recovers to 1-teens with rest

## 2023-11-09 LAB
GLUCOSE BLD-MCNC: 109 MG/DL (ref 70–99)
GLUCOSE BLD-MCNC: 128 MG/DL (ref 70–99)
GLUCOSE BLD-MCNC: 133 MG/DL (ref 70–99)
GLUCOSE BLD-MCNC: 208 MG/DL (ref 70–99)
GLUCOSE BLD-MCNC: 64 MG/DL (ref 70–99)
GLUCOSE BLD-MCNC: 79 MG/DL (ref 70–99)

## 2023-11-09 RX ORDER — DILTIAZEM HYDROCHLORIDE 120 MG/1
120 CAPSULE, EXTENDED RELEASE ORAL DAILY
Status: DISCONTINUED | OUTPATIENT
Start: 2023-11-09 | End: 2023-11-10

## 2023-11-09 NOTE — DISCHARGE INSTRUCTIONS
[address]. The lending closet is located at the [facility] and helps those who are in need of: Wheelchairs, walkers, shower chairs, benches, etc.   For more information, call [address] [place] [address] Dr Antonio  [phone]  Monday-Friday 8:30-4:30pm  Walkers, canes, wheelchairs, commodes, bath shower benches for lending    A commode has been ordered for you through [facility]. [facility]  D:[phone]  W:[phone]    Sometimes managing your health at home requires assistance. The [facility] team has recognized your preference to use [facility]. They can be reached by phone at [phone]. The fax number for your reference is [phone]. A representative from the home health agency will contact you or your family to schedule your first visit.

## 2023-11-09 NOTE — PLAN OF CARE
Patient Arlet Guido. Jimmy speaking. On room air. A fib on tele. No c/o pain. Remains on IV Antibiotics. Continent of bowel and bladder. Up with 1 assit and walker. Family at bedside. Plan of care updated  Problem: Diabetes/Glucose Control  Goal: Glucose maintained within prescribed range  Description: INTERVENTIONS:  - Monitor Blood Glucose as ordered  - Assess for signs and symptoms of hyperglycemia and hypoglycemia  - Administer ordered medications to maintain glucose within target range  - Assess barriers to adequate nutritional intake and initiate nutrition consult as needed  - Instruct patient on self management of diabetes  Outcome: Progressing     Problem: Patient/Family Goals  Goal: Patient/Family Long Term Goal  Description: Patient's Long Term Goal: Stay out of hospital    Interventions:  - Follow up MD VALEROfib control  -Take med as prescribe   - See additional Care Plan goals for specific interventions  Outcome: Progressing  Goal: Patient/Family Short Term Goal  Description: Patient's Short Term Goal: Pain free    Interventions:   - Pain med as need  -Blood glucose control  -Monitor BP  -Monitor Labs  - See additional Care Plan goals for specific interventions  Outcome: Progressing     Problem: CARDIOVASCULAR - ADULT  Goal: Maintains optimal cardiac output and hemodynamic stability  Description: INTERVENTIONS:  - Monitor vital signs, rhythm, and trends  - Monitor for bleeding, hypotension and signs of decreased cardiac output  - Evaluate effectiveness of vasoactive medications to optimize hemodynamic stability  - Monitor arterial and/or venous puncture sites for bleeding and/or hematoma  - Assess quality of pulses, skin color and temperature  - Assess for signs of decreased coronary artery perfusion - ex.  Angina  - Evaluate fluid balance, assess for edema, trend weights  Outcome: Progressing  Goal: Absence of cardiac arrhythmias or at baseline  Description: INTERVENTIONS:  - Continuous cardiac monitoring, monitor vital signs, obtain 12 lead EKG if indicated  - Evaluate effectiveness of antiarrhythmic and heart rate control medications as ordered  - Initiate emergency measures for life threatening arrhythmias  - Monitor electrolytes and administer replacement therapy as ordered  Outcome: Progressing

## 2023-11-09 NOTE — CM/SW NOTE
11/09/23 1415   Choice of Post-Acute Provider   Informed patient of right to choose their preferred provider Yes   List of appropriate post-acute services provided to patient/family with quality data Yes   Information given to Son     ARASH provided family with the [address] list. They are going to review options. ARASH to follow back later. ARASH also provided family with [facility]'s contact information at [facility].      SLY Diaz, [facility]  Discharge [address] [facility].

## 2023-11-09 NOTE — CM/SW NOTE
11/09/23 1200   CM/SW Referral Data   Referral Source Social Work (self-referral)   Reason for Referral Discharge planning   Informant Other  (Family members)   Medical Hx   Does patient have an established PCP? Yes   Patient Info   Patient's Current Mental Status at Time of Assessment Alert;Oriented   Patient's [address]   Patient lives with Son   Patient Status Prior to Admission   Independent with ADLs and Mobility Yes   Discharge Needs   Anticipated D/C needs Home health care;Caregiver services; Medical equipment       HOME SITUATION  Type of Home: House   Home Layout: Two level; Able to live on main level     Lives With: Spouse; Family  Drives: No     Prior Level of [place]: Pt lives with spouse, son, and his family in 2 story home. Pt stays on the main level of the home. Pt independent with ADL and mobility. Pt ambulates with RW.       ARASH met with pt and family members at bedside to work on discharge planning. Pt resides at home with family. PT has worked with pt at hospital and is recommending [address] at discharge. Pt has supportive family. Pt has history of HHC. Discussed HHC, family is agreeable. Family inquired about DME at home (hospital bed, commode, wheelchair). Explained that pt was able to ambulate during  feet, and therefore, pt would not qualify for a hospital bed or wheelchair. Son explained that pt recently fell in the bathroom. Discussed ordering a commode. ARASH provided family with [employer] information to privately purchase a bed and wheelchair. ARASH sent commode and documentation to HME in [facility]. ARASH also sent [facility] referrals in [facility]. Orders entered. Will provide choice list once available. Son also interested in possible caregiver support. Explained that caregivers would be a private pay cost. ARASH will provide them with [facility] [employer] [facility] information. Family expressed appreciation. RN updated. Spoke with Hospitalist - erika michael on Friday.      &  to remain available and supportive for discharge planning needs.     SLY Bergeron, [facility]  Discharge [address] [facility].

## 2023-11-09 NOTE — PLAN OF CARE
Patient A&Ox4, room air. Jimmy speaking, family at the bed side for . A-fib on tele on xarelto. Breath sound clear, diminished. Denied chest pain, denied dizziness when up. Last BM was yesterday. Continent bladder and bowel. Discussed POC with patient and family. All questions answered at this time   Problem: Diabetes/Glucose Control  Goal: Glucose maintained within prescribed range  Description: INTERVENTIONS:  - Monitor Blood Glucose as ordered  - Assess for signs and symptoms of hyperglycemia and hypoglycemia  - Administer ordered medications to maintain glucose within target range  - Assess barriers to adequate nutritional intake and initiate nutrition consult as needed  - Instruct patient on self management of diabetes  Outcome: Progressing     Problem: Patient/Family Goals  Goal: Patient/Family Long Term Goal  Description: Patient's Long Term Goal: Stay out of hospital    Interventions:  - Follow up MD  -YOLIEfib control  -Take med as prescribe   - See additional Care Plan goals for specific interventions  Outcome: Progressing  Goal: Patient/Family Short Term Goal  Description: Patient's Short Term Goal: Pain free    Interventions:   - Pain med as need  -Blood glucose control  -Monitor BP  -Monitor Labs  - See additional Care Plan goals for specific interventions  Outcome: Progressing     Problem: CARDIOVASCULAR - ADULT  Goal: Maintains optimal cardiac output and hemodynamic stability  Description: INTERVENTIONS:  - Monitor vital signs, rhythm, and trends  - Monitor for bleeding, hypotension and signs of decreased cardiac output  - Evaluate effectiveness of vasoactive medications to optimize hemodynamic stability  - Monitor arterial and/or venous puncture sites for bleeding and/or hematoma  - Assess quality of pulses, skin color and temperature  - Assess for signs of decreased coronary artery perfusion - ex.  Angina  - Evaluate fluid balance, assess for edema, trend weights  Outcome: Progressing  Goal: Absence of cardiac arrhythmias or at baseline  Description: INTERVENTIONS:  - Continuous cardiac monitoring, monitor vital signs, obtain 12 lead EKG if indicated  - Evaluate effectiveness of antiarrhythmic and heart rate control medications as ordered  - Initiate emergency measures for life threatening arrhythmias  - Monitor electrolytes and administer replacement therapy as ordered  Outcome: Progressing     Problem: RESPIRATORY - ADULT  Goal: Achieves optimal ventilation and oxygenation  Description: INTERVENTIONS:  - Assess for changes in respiratory status  - Assess for changes in mentation and behavior  - Position to facilitate oxygenation and minimize respiratory effort  - Oxygen supplementation based on oxygen saturation or ABGs  - Provide Smoking Cessation handout, if applicable  - Encourage broncho-pulmonary hygiene including cough, deep breathe, Incentive Spirometry  - Assess the need for suctioning and perform as needed  - Assess and instruct to report SOB or any respiratory difficulty  - Respiratory Therapy support as indicated  - Manage/alleviate anxiety  - Monitor for signs/symptoms of CO2 retention  Outcome: Progressing     Problem: METABOLIC/FLUID AND ELECTROLYTES - ADULT  Goal: Electrolytes maintained within normal limits  Description: INTERVENTIONS:  - Monitor labs and rhythm and assess patient for signs and symptoms of electrolyte imbalances  - Administer electrolyte replacement as ordered  - Monitor response to electrolyte replacements, including rhythm and repeat lab results as appropriate  - Fluid restriction as ordered  - Instruct patient on fluid and nutrition restrictions as appropriate  Outcome: Progressing     Problem: SKIN/TISSUE INTEGRITY - ADULT  Goal: Incision(s), wounds(s) or drain site(s) healing without S/S of infection  Description: INTERVENTIONS:  - Assess and document risk factors for pressure ulcer development  - Assess and document skin integrity  - Assess and document dressing/incision, wound bed, drain sites and surrounding tissue  - Implement wound care per orders  - Initiate isolation precautions as appropriate  - Initiate Pressure Ulcer prevention bundle as indicated  Outcome: Progressing

## 2023-11-10 VITALS
RESPIRATION RATE: 18 BRPM | OXYGEN SATURATION: 100 % | TEMPERATURE: 98 F | HEART RATE: 68 BPM | WEIGHT: 144.19 LBS | SYSTOLIC BLOOD PRESSURE: 107 MMHG | HEIGHT: 69 IN | DIASTOLIC BLOOD PRESSURE: 62 MMHG | BODY MASS INDEX: 21.36 KG/M2

## 2023-11-10 LAB
GLUCOSE BLD-MCNC: 110 MG/DL (ref 70–99)
GLUCOSE BLD-MCNC: 232 MG/DL (ref 70–99)

## 2023-11-10 PROCEDURE — 99232 SBSQ HOSP IP/OBS MODERATE 35: CPT | Performed by: NURSE PRACTITIONER

## 2023-11-10 RX ORDER — METOPROLOL TARTRATE 50 MG/1
50 TABLET, FILM COATED ORAL
Qty: 90 TABLET | Refills: 0 | Status: SHIPPED | OUTPATIENT
Start: 2023-11-10

## 2023-11-10 RX ORDER — DILTIAZEM HYDROCHLORIDE 180 MG/1
180 CAPSULE, EXTENDED RELEASE ORAL DAILY
Status: DISCONTINUED | OUTPATIENT
Start: 2023-11-10 | End: 2023-11-10

## 2023-11-10 NOTE — CM/SW NOTE
SW met with pt's sons at bedside to get [facility]. They had questions about HHC. SW requested [facility] liaison to reach out to family to address questions. Family is also interested in hospice information at discharge. Spoke with [facility] liaison to make aware. Son has a lot of questions regarding hospice care. Palliative Care APN also aware. Pt is going to be discharged shortly. Son confirmed that the commode was going to be delivered today.      SLY Nathan, [facility]  Discharge [address] [facility].

## 2023-11-10 NOTE — HOME CARE LIAISON
[facility]. Spoke w/ patient's son who is agreeable with [address].  Contact information placed on AVS.

## 2023-11-10 NOTE — DISCHARGE SUMMARY
[facility] Internal Medicine Hospitalist Discharge Summary     Patient ID:  Goyo Lomax  82year old  8/13/1935    Admit date: 11/6/2023    Discharge date and time: 11/10/2023    Attending Physician: Amna Mccormick DO     Primary Care Physician: Dai Razo MD     Discharge Diagnoses:   High grade SMA stenosis   Acute kidney injury   Lactic acidosis  HTN  Anemia   A-fib  HLD  DM II  Hx of CVA/TIA    Please note that only IHP DMG and EMG patients enrolled in the Medicare ACO, [facility] and [address] [place] [address] will be handled by the [address] [place], [place] [address]S [facility] Management team.  For all other patients, please follow usual protocol for discharge care transition. Discharge Condition: stable    Disposition:  Home with home care     Important Follow up:  - PCP within 1 week  - Consults: Vascular, cardiology, palliative care    Follow Up Items:  None      Hospital Course:      82year old male with PMH significant for A-fib, HTN, DL, CVA/TIA, DM 2 MALCOLM, hypothyroidism, who presented to the ED with chest pain and abdominal pain. Abdominal pain 2/2 SMA high grade stenosis, w/ bowel wall thickening c/w ischemic enteritis  Lactic acidosis 2/2 above  -monitor on tele  -s/p IV heparin, transitioned back to po xarelto   -stopped IV abx  -vascular surgery consulted >> appreciate recs, cont hep gtt for now, family without plans for surgical intervention. No benefit from increasing ASA to 325 mg or adding plavix.    -IVF, antiemetics, pain control  -PT/OT-->home care     Leukocytosis 2/2 above  -downtrending  -repeat labs in the morning     Acute kidney Injury - likely prerenal  -monitor renal function  -avoid nephrotoxic agents  -s/p IVFs  -creatinine down trending     Anemia  -hgb 11.1  -monitor     HTN  -resume home meds     Afib on xarelto  Secondary hypercoag state  -metoprolol resumed  -diltiazem resumed   -cont xarelto      DL  -statin     DM2 with hyperglycemia   -also had episode of hypoglycemia this AM  -ISS+accuchecks  -hold home meds. Will plan to stop glimeperide on discharge, cont metformin. MALCOLM  -cpap if needed     Hypothyroidism  -synthroid     CVA/TIA  -stable     Severe malnutrition   - cont supplements per dietary recs    Stable for discharge home with home care. Consults: IP CONSULT TO CARDIOLOGY  IP CONSULT TO VASCULAR SURGERY  IP CONSULT TO HOSPITALIST  IP CONSULT TO SOCIAL WORK  NURSING CONSULT TO DIETITIAN  IP CONSULT PALLIATIVE CARE  IP CONSULT TO SOCIAL WORK  IP CONSULT TO SOCIAL WORK  IP CONSULT TO SOCIAL WORK    Operative Procedures:        Patient instructions:      I as the attending physician reconciled the current and discharge medications on day of discharge. Current Discharge Medication List        CONTINUE these medications which have NOT CHANGED    Details   meclizine 12.5 MG Oral Tab Take 1 tablet (12.5 mg total) by mouth 3 (three) times daily as needed for Dizziness. triamcinolone 0.1 % External Cream Apply 1 Application topically 2 (two) times daily as needed (legs and feet). Ferrous Sulfate 325 (65 Fe) MG Oral Tab Take 1 tablet (325 mg total) by mouth 2 (two) times daily. dilTIAZem HCl  MG Oral Capsule SR 24 Hr Take 1 capsule (180 mg total) by mouth daily. metoprolol tartrate 100 MG Oral Tab Take 0.5 tablets (50 mg total) by mouth 2 (two) times daily. levothyroxine 25 MCG Oral Tab Take 1 tablet (25 mcg total) by mouth before breakfast.      tamsulosin (FLOMAX) cap TAKE 1 CAPSULE BY MOUTH DAILY      JANUVIA 25 MG Oral Tab TAKE 1 TABLET(25 MG) BY MOUTH DAILY      ATORVASTATIN 40 MG Oral Tab TAKE 1 TABLET BY MOUTH EVERY NIGHT AT BEDTIME      METFORMIN HCL 1000 MG Oral Tab TAKE 1 TABLET BY MOUTH TWICE DAILY WITH MEALS      Rivaroxaban (XARELTO) 20 MG Oral Tab TAKE 1 TABLET BY MOUTH DAILY WITH FOOD      !!  Blood Glucose Monitoring Suppl (ONETOUCH ULTRA 2) w/Device Does not apply Kit Test blood glucose once daily  Diag E11.9      !! Glucose Blood (ONETOUCH ULTRA BLUE) In Vitro Strip Test blood glucose once daily  Diag E11.9      !! OneTouch UltraSoft Lancets Does not apply Misc Test blood glucose once daily  Diag E11.9      !! Blood Glucose Monitoring Suppl (CHELSEA CONTOUR LINK 2. 4) w/Device Does not apply Kit 1 each by Does not apply route. Check BG daily      !! Chelsea Microlet Lancets Does not apply Misc Check blood glucose daily  diag E11.9      !! Glucose Blood (CONTOUR NEXT TEST) In Vitro Strip Check blood glucose daily  diag E11.9      !! Glucose Blood (CHELSEA CONTOUR NEXT TEST) In Vitro Strip Use to check blood glucose once daily in the morning before breakfast      !! CHELSEA MICROLET LANCETS Does not apply Misc Use to check blood glucose once daily in the morning before breakfast      aspirin (ASPIRIN EC LOW DOSE) 81 MG Oral Tab EC Take 1 tablet (81 mg total) by mouth once daily. !! - Potential duplicate medications found. Please discuss with provider. STOP taking these medications       famotidine 20 MG Oral Tab        torsemide 20 MG Oral Tab        furosemide 40 MG Oral Tab        LISINOPRIL 40 MG Oral Tab        GLIMEPIRIDE 2 MG Oral Tab        HYDROCHLOROTHIAZIDE 25 MG Oral Tab              Activity: activity as tolerated  Diet: regular diet  Wound Care: as directed  Code Status: DNAR/Selective Treatment      Exam on day of discharge:     Vitals:    11/10/23 1102   BP:    Pulse: 84   Resp:    Temp:        General: No acute distress. Elderly. HEENT:  EOMI, PERRLA, OP clear  Respiratory: Clear to auscultation bilaterally. No wheezes. No rhonchi. Cardiovascular: S1, S2. Regular rate and rhythm. No murmurs. Abdomen: Soft, nontender, nondistended. Positive bowel sounds. No rebound or guarding. Extremities: No edema. Neuro:  Grossly non focal, no motor deficits.       Total time coordinating care for discharge: Greater than 30 minutes    [address] and Care Hospitalist

## 2023-11-10 NOTE — PLAN OF CARE
"Chief Complaint   Patient presents with     Establish Care     on and off chest pains for about 4 yrs now. No actual testings have been performed.      Medication Reconciliation     Add contraceptives from Med Rec. Otherwise not taking any meds at this time.        /74   Pulse 84   Temp 98.3  F (36.8  C) (Oral)   Resp 18   Ht 1.735 m (5' 8.31\")   Wt 62.6 kg (138 lb)   LMP 07/01/2020   SpO2 100%   BMI 20.79 kg/m        ~ Guerrero Ruth CMA ([place])  [facility]  Phone: [phone]      See care everywhere for past records. Pt is planning to come here for her care now.     TOMEKA Hollingsworth CMA ([place])   [facility] [address] [place] [address]  [phone]    " Patient alert and oriented x 4; patient Jimmy speaking, son bedside to help translate. On RA. Up with x1 w/ walker. Afib on tele. Continent of bowel/bladder. No complaints of pain, shortness of breath, chest pain/discomfort. POC: discharge later today. Call light within reach. Fall precautions in place. Problem: Diabetes/Glucose Control  Goal: Glucose maintained within prescribed range  Description: INTERVENTIONS:  - Monitor Blood Glucose as ordered  - Assess for signs and symptoms of hyperglycemia and hypoglycemia  - Administer ordered medications to maintain glucose within target range  - Assess barriers to adequate nutritional intake and initiate nutrition consult as needed  - Instruct patient on self management of diabetes  Outcome: Progressing     Problem: Patient/Family Goals  Goal: Patient/Family Long Term Goal  Description: Patient's Long Term Goal: Stay out of hospital    Interventions:  - Follow up MD VALEROfib control  -Take med as prescribe   - See additional Care Plan goals for specific interventions  Outcome: Progressing  Goal: Patient/Family Short Term Goal  Description: Patient's Short Term Goal: Pain free    Interventions:   - Pain med as need  -Blood glucose control  -Monitor BP  -Monitor Labs  - See additional Care Plan goals for specific interventions  Outcome: Progressing     Problem: CARDIOVASCULAR - ADULT  Goal: Maintains optimal cardiac output and hemodynamic stability  Description: INTERVENTIONS:  - Monitor vital signs, rhythm, and trends  - Monitor for bleeding, hypotension and signs of decreased cardiac output  - Evaluate effectiveness of vasoactive medications to optimize hemodynamic stability  - Monitor arterial and/or venous puncture sites for bleeding and/or hematoma  - Assess quality of pulses, skin color and temperature  - Assess for signs of decreased coronary artery perfusion - ex.  Angina  - Evaluate fluid balance, assess for edema, trend weights  Outcome: Progressing  Goal: Absence of cardiac arrhythmias or at baseline  Description: INTERVENTIONS:  - Continuous cardiac monitoring, monitor vital signs, obtain 12 lead EKG if indicated  - Evaluate effectiveness of antiarrhythmic and heart rate control medications as ordered  - Initiate emergency measures for life threatening arrhythmias  - Monitor electrolytes and administer replacement therapy as ordered  Outcome: Progressing     Problem: RESPIRATORY - ADULT  Goal: Achieves optimal ventilation and oxygenation  Description: INTERVENTIONS:  - Assess for changes in respiratory status  - Assess for changes in mentation and behavior  - Position to facilitate oxygenation and minimize respiratory effort  - Oxygen supplementation based on oxygen saturation or ABGs  - Provide Smoking Cessation handout, if applicable  - Encourage broncho-pulmonary hygiene including cough, deep breathe, Incentive Spirometry  - Assess the need for suctioning and perform as needed  - Assess and instruct to report SOB or any respiratory difficulty  - Respiratory Therapy support as indicated  - Manage/alleviate anxiety  - Monitor for signs/symptoms of CO2 retention  Outcome: Progressing     Problem: METABOLIC/FLUID AND ELECTROLYTES - ADULT  Goal: Electrolytes maintained within normal limits  Description: INTERVENTIONS:  - Monitor labs and rhythm and assess patient for signs and symptoms of electrolyte imbalances  - Administer electrolyte replacement as ordered  - Monitor response to electrolyte replacements, including rhythm and repeat lab results as appropriate  - Fluid restriction as ordered  - Instruct patient on fluid and nutrition restrictions as appropriate  Outcome: Progressing     Problem: SKIN/TISSUE INTEGRITY - ADULT  Goal: Incision(s), wounds(s) or drain site(s) healing without S/S of infection  Description: INTERVENTIONS:  - Assess and document risk factors for pressure ulcer development  - Assess and document skin integrity  - Assess and document dressing/incision, wound bed, drain sites and surrounding tissue  - Implement wound care per orders  - Initiate isolation precautions as appropriate  - Initiate Pressure Ulcer prevention bundle as indicated  Outcome: Progressing

## 2023-11-10 NOTE — PROGRESS NOTES
Residential liaison received hospice referral family wants hospice information when discharged home. [facility] will follow up tomorrow with pt/family.

## 2023-11-10 NOTE — PLAN OF CARE
Assumed pt care at [address] [place] [address]. A&O x4. Pt is Jimmy speaking. Tele rhythm Atrial fibrillation. SPO2 98% on room air. Breath sounds clear bilaterally. Pt voiding with no issues. No c/o chest pain or shortness of breath. BLE betsy. Bed is locked and in low position. Call light and personal items within reach. Reviewed plan of care and patient verbalizes understanding. Pt Nephew at the bedside for translation.   POC: Possible discharge 11/10.  0510: Pt refused labs this morning  Problem: Patient/Family Goals  Goal: Patient/Family Long Term Goal  Description: Patient's Long Term Goal: Stay out of hospital    Interventions:  - Follow up MD VALEROfib control  -Take med as prescribe   - See additional Care Plan goals for specific interventions  Outcome: Progressing  Goal: Patient/Family Short Term Goal  Description: Patient's Short Term Goal: Pain free    Interventions:   - Pain med as need  -Blood glucose control  -Monitor BP  -Monitor Labs  - See additional Care Plan goals for specific interventions  Outcome: Progressing     Problem: Diabetes/Glucose Control  Goal: Glucose maintained within prescribed range  Description: INTERVENTIONS:  - Monitor Blood Glucose as ordered  - Assess for signs and symptoms of hyperglycemia and hypoglycemia  - Administer ordered medications to maintain glucose within target range  - Assess barriers to adequate nutritional intake and initiate nutrition consult as needed  - Instruct patient on self management of diabetes  Outcome: Progressing     Problem: CARDIOVASCULAR - ADULT  Goal: Maintains optimal cardiac output and hemodynamic stability  Description: INTERVENTIONS:  - Monitor vital signs, rhythm, and trends  - Monitor for bleeding, hypotension and signs of decreased cardiac output  - Evaluate effectiveness of vasoactive medications to optimize hemodynamic stability  - Monitor arterial and/or venous puncture sites for bleeding and/or hematoma  - Assess quality of pulses, skin color and temperature  - Assess for signs of decreased coronary artery perfusion - ex.  Angina  - Evaluate fluid balance, assess for edema, trend weights  Outcome: Progressing  Goal: Absence of cardiac arrhythmias or at baseline  Description: INTERVENTIONS:  - Continuous cardiac monitoring, monitor vital signs, obtain 12 lead EKG if indicated  - Evaluate effectiveness of antiarrhythmic and heart rate control medications as ordered  - Initiate emergency measures for life threatening arrhythmias  - Monitor electrolytes and administer replacement therapy as ordered  Outcome: Progressing     Problem: RESPIRATORY - ADULT  Goal: Achieves optimal ventilation and oxygenation  Description: INTERVENTIONS:  - Assess for changes in respiratory status  - Assess for changes in mentation and behavior  - Position to facilitate oxygenation and minimize respiratory effort  - Oxygen supplementation based on oxygen saturation or ABGs  - Provide Smoking Cessation handout, if applicable  - Encourage broncho-pulmonary hygiene including cough, deep breathe, Incentive Spirometry  - Assess the need for suctioning and perform as needed  - Assess and instruct to report SOB or any respiratory difficulty  - Respiratory Therapy support as indicated  - Manage/alleviate anxiety  - Monitor for signs/symptoms of CO2 retention  Outcome: Progressing     Problem: SKIN/TISSUE INTEGRITY - ADULT  Goal: Incision(s), wounds(s) or drain site(s) healing without S/S of infection  Description: INTERVENTIONS:  - Assess and document risk factors for pressure ulcer development  - Assess and document skin integrity  - Assess and document dressing/incision, wound bed, drain sites and surrounding tissue  - Implement wound care per orders  - Initiate isolation precautions as appropriate  - Initiate Pressure Ulcer prevention bundle as indicated  Outcome: Progressing     Problem: METABOLIC/FLUID AND ELECTROLYTES - ADULT  Goal: Electrolytes maintained within normal limits  Description: INTERVENTIONS:  - Monitor labs and rhythm and assess patient for signs and symptoms of electrolyte imbalances  - Administer electrolyte replacement as ordered  - Monitor response to electrolyte replacements, including rhythm and repeat lab results as appropriate  - Fluid restriction as ordered  - Instruct patient on fluid and nutrition restrictions as appropriate  Outcome: Progressing

## 2023-11-13 ENCOUNTER — PATIENT OUTREACH (OUTPATIENT)
Dept: CASE MANAGEMENT | Age: 88
End: 2023-11-13

## 2023-11-13 NOTE — PROGRESS NOTES
Hospital follow up. DM request with PCP. Chantale Pendleton MD  [facility], Internal [address], [facility] [address]  [phone]  Friday 11/17 @8:30    Confirmed with patient's daughter-in-law. Closing encounter.

## 2024-01-01 ENCOUNTER — APPOINTMENT (OUTPATIENT)
Dept: ULTRASOUND IMAGING | Facility: HOSPITAL | Age: 89
End: 2024-01-01
Attending: INTERNAL MEDICINE
Payer: MEDICARE

## 2024-01-01 ENCOUNTER — APPOINTMENT (OUTPATIENT)
Dept: ULTRASOUND IMAGING | Facility: HOSPITAL | Age: 89
End: 2024-01-01
Attending: NURSE PRACTITIONER
Payer: MEDICARE

## 2024-01-01 ENCOUNTER — APPOINTMENT (OUTPATIENT)
Dept: CT IMAGING | Facility: HOSPITAL | Age: 89
End: 2024-01-01
Attending: EMERGENCY MEDICINE
Payer: MEDICARE

## 2024-01-01 ENCOUNTER — APPOINTMENT (OUTPATIENT)
Dept: GENERAL RADIOLOGY | Facility: HOSPITAL | Age: 89
End: 2024-01-01
Attending: INTERNAL MEDICINE
Payer: MEDICARE

## 2024-01-01 ENCOUNTER — APPOINTMENT (OUTPATIENT)
Dept: CV DIAGNOSTICS | Facility: HOSPITAL | Age: 89
End: 2024-01-01
Attending: STUDENT IN AN ORGANIZED HEALTH CARE EDUCATION/TRAINING PROGRAM
Payer: MEDICARE

## 2024-01-01 ENCOUNTER — APPOINTMENT (OUTPATIENT)
Dept: GENERAL RADIOLOGY | Facility: HOSPITAL | Age: 89
End: 2024-01-01
Attending: NURSE PRACTITIONER
Payer: MEDICARE

## 2024-01-01 ENCOUNTER — HOSPITAL ENCOUNTER (INPATIENT)
Facility: HOSPITAL | Age: 89
LOS: 11 days | End: 2024-01-01
Attending: EMERGENCY MEDICINE | Admitting: INTERNAL MEDICINE
Payer: MEDICARE

## 2024-01-01 ENCOUNTER — APPOINTMENT (OUTPATIENT)
Dept: CT IMAGING | Facility: HOSPITAL | Age: 89
End: 2024-01-01
Attending: INTERNAL MEDICINE
Payer: MEDICARE

## 2024-01-01 ENCOUNTER — APPOINTMENT (OUTPATIENT)
Dept: ULTRASOUND IMAGING | Facility: HOSPITAL | Age: 89
End: 2024-01-01
Attending: HOSPITALIST
Payer: MEDICARE

## 2024-01-01 ENCOUNTER — APPOINTMENT (OUTPATIENT)
Dept: GENERAL RADIOLOGY | Facility: HOSPITAL | Age: 89
End: 2024-01-01
Attending: RADIOLOGY
Payer: MEDICARE

## 2024-01-01 ENCOUNTER — APPOINTMENT (OUTPATIENT)
Dept: GENERAL RADIOLOGY | Facility: HOSPITAL | Age: 89
End: 2024-01-01
Payer: MEDICARE

## 2024-01-01 VITALS
DIASTOLIC BLOOD PRESSURE: 48 MMHG | OXYGEN SATURATION: 98 % | HEIGHT: 70 IN | WEIGHT: 172.63 LBS | SYSTOLIC BLOOD PRESSURE: 106 MMHG | TEMPERATURE: 99 F | BODY MASS INDEX: 24.71 KG/M2

## 2024-01-01 DIAGNOSIS — R74.8 ELEVATED LIVER ENZYMES: ICD-10-CM

## 2024-01-01 DIAGNOSIS — E87.20 LACTIC ACID ACIDOSIS: ICD-10-CM

## 2024-01-01 DIAGNOSIS — R50.9 FEVER, UNSPECIFIED FEVER CAUSE: ICD-10-CM

## 2024-01-01 DIAGNOSIS — N39.0 URINARY TRACT INFECTION WITHOUT HEMATURIA, SITE UNSPECIFIED: Primary | ICD-10-CM

## 2024-01-01 DIAGNOSIS — E86.0 DEHYDRATION: ICD-10-CM

## 2024-01-01 LAB
ADENOVIRUS PCR:: NOT DETECTED
ADENOVIRUS PCR:: NOT DETECTED
ALBUMIN SERPL-MCNC: 1.3 G/DL (ref 3.4–5)
ALBUMIN SERPL-MCNC: 1.4 G/DL (ref 3.4–5)
ALBUMIN SERPL-MCNC: 1.4 G/DL (ref 3.4–5)
ALBUMIN SERPL-MCNC: 1.5 G/DL (ref 3.4–5)
ALBUMIN SERPL-MCNC: 1.6 G/DL (ref 3.4–5)
ALBUMIN SERPL-MCNC: 1.6 G/DL (ref 3.4–5)
ALBUMIN SERPL-MCNC: 1.8 G/DL (ref 3.4–5)
ALBUMIN SERPL-MCNC: 1.8 G/DL (ref 3.4–5)
ALBUMIN SERPL-MCNC: 1.9 G/DL (ref 3.4–5)
ALBUMIN SERPL-MCNC: 1.9 G/DL (ref 3.4–5)
ALBUMIN SERPL-MCNC: 2 G/DL (ref 3.4–5)
ALBUMIN SERPL-MCNC: 2.4 G/DL (ref 3.4–5)
ALBUMIN SERPL-MCNC: 2.5 G/DL (ref 3.4–5)
ALBUMIN/GLOB SERPL: 0.4 {RATIO} (ref 1–2)
ALBUMIN/GLOB SERPL: 0.5 {RATIO} (ref 1–2)
ALBUMIN/GLOB SERPL: 0.6 {RATIO} (ref 1–2)
ALBUMIN/GLOB SERPL: 0.6 {RATIO} (ref 1–2)
ALP LIVER SERPL-CCNC: 172 U/L
ALP LIVER SERPL-CCNC: 203 U/L
ALP LIVER SERPL-CCNC: 223 U/L
ALP LIVER SERPL-CCNC: 241 U/L
ALP LIVER SERPL-CCNC: 249 U/L
ALP LIVER SERPL-CCNC: 357 U/L
ALP LIVER SERPL-CCNC: 364 U/L
ALP LIVER SERPL-CCNC: 382 U/L
ALP LIVER SERPL-CCNC: 453 U/L
ALP LIVER SERPL-CCNC: 540 U/L
ALP LIVER SERPL-CCNC: 555 U/L
ALP LIVER SERPL-CCNC: 574 U/L
ALP LIVER SERPL-CCNC: 585 U/L
ALT SERPL-CCNC: 105 U/L
ALT SERPL-CCNC: 110 U/L
ALT SERPL-CCNC: 112 U/L
ALT SERPL-CCNC: 115 U/L
ALT SERPL-CCNC: 125 U/L
ALT SERPL-CCNC: 137 U/L
ALT SERPL-CCNC: 190 U/L
ALT SERPL-CCNC: 255 U/L
ALT SERPL-CCNC: 257 U/L
ALT SERPL-CCNC: 283 U/L
ALT SERPL-CCNC: 313 U/L
ALT SERPL-CCNC: 315 U/L
ALT SERPL-CCNC: 88 U/L
AMYLASE SERPL-CCNC: 52 U/L (ref 25–115)
ANION GAP SERPL CALC-SCNC: 10 MMOL/L (ref 0–18)
ANION GAP SERPL CALC-SCNC: 5 MMOL/L (ref 0–18)
ANION GAP SERPL CALC-SCNC: 6 MMOL/L (ref 0–18)
ANION GAP SERPL CALC-SCNC: 7 MMOL/L (ref 0–18)
ANION GAP SERPL CALC-SCNC: 8 MMOL/L (ref 0–18)
ANION GAP SERPL CALC-SCNC: 9 MMOL/L (ref 0–18)
ARTERIAL PATENCY WRIST A: POSITIVE
AST SERPL-CCNC: 132 U/L (ref 15–37)
AST SERPL-CCNC: 160 U/L (ref 15–37)
AST SERPL-CCNC: 178 U/L (ref 15–37)
AST SERPL-CCNC: 197 U/L (ref 15–37)
AST SERPL-CCNC: 198 U/L (ref 15–37)
AST SERPL-CCNC: 323 U/L (ref 15–37)
AST SERPL-CCNC: 358 U/L (ref 15–37)
AST SERPL-CCNC: 412 U/L (ref 15–37)
AST SERPL-CCNC: 558 U/L (ref 15–37)
AST SERPL-CCNC: 82 U/L (ref 15–37)
AST SERPL-CCNC: 82 U/L (ref 15–37)
AST SERPL-CCNC: 86 U/L (ref 15–37)
AST SERPL-CCNC: 98 U/L (ref 15–37)
ATRIAL RATE: 101 BPM
ATRIAL RATE: 129 BPM
B PARAPERT DNA SPEC QL NAA+PROBE: NOT DETECTED
B PARAPERT DNA SPEC QL NAA+PROBE: NOT DETECTED
B PERT DNA SPEC QL NAA+PROBE: NOT DETECTED
B PERT DNA SPEC QL NAA+PROBE: NOT DETECTED
BASE EXCESS BLDA CALC-SCNC: -2.8 MMOL/L (ref ?–2)
BASE EXCESS BLDA CALC-SCNC: -6.3 MMOL/L (ref ?–2)
BASE EXCESS BLDA CALC-SCNC: -9.7 MMOL/L (ref ?–2)
BASOPHILS # BLD AUTO: 0.01 X10(3) UL (ref 0–0.2)
BASOPHILS # BLD AUTO: 0.02 X10(3) UL (ref 0–0.2)
BASOPHILS # BLD AUTO: 0.02 X10(3) UL (ref 0–0.2)
BASOPHILS # BLD AUTO: 0.03 X10(3) UL (ref 0–0.2)
BASOPHILS # BLD AUTO: 0.03 X10(3) UL (ref 0–0.2)
BASOPHILS # BLD AUTO: 0.06 X10(3) UL (ref 0–0.2)
BASOPHILS NFR BLD AUTO: 0.1 %
BASOPHILS NFR BLD AUTO: 0.1 %
BASOPHILS NFR BLD AUTO: 0.2 %
BASOPHILS NFR BLD AUTO: 0.3 %
BASOPHILS NFR BLD AUTO: 0.3 %
BASOPHILS NFR BLD AUTO: 0.4 %
BASOPHILS NFR PLR: 0 %
BASOPHILS NFR PLR: 0 %
BILIRUB SERPL-MCNC: 1 MG/DL (ref 0.1–2)
BILIRUB SERPL-MCNC: 1 MG/DL (ref 0.1–2)
BILIRUB SERPL-MCNC: 1.2 MG/DL (ref 0.1–2)
BILIRUB SERPL-MCNC: 1.3 MG/DL (ref 0.1–2)
BILIRUB SERPL-MCNC: 1.4 MG/DL (ref 0.1–2)
BILIRUB SERPL-MCNC: 1.4 MG/DL (ref 0.1–2)
BILIRUB SERPL-MCNC: 1.6 MG/DL (ref 0.1–2)
BILIRUB SERPL-MCNC: 1.7 MG/DL (ref 0.1–2)
BILIRUB SERPL-MCNC: 1.9 MG/DL (ref 0.1–2)
BILIRUB SERPL-MCNC: 2.3 MG/DL (ref 0.1–2)
BILIRUB SERPL-MCNC: 2.7 MG/DL (ref 0.1–2)
BILIRUB UR QL STRIP.AUTO: NEGATIVE
BODY TEMPERATURE: 98.6 F
BUN BLD-MCNC: 22 MG/DL (ref 9–23)
BUN BLD-MCNC: 24 MG/DL (ref 9–23)
BUN BLD-MCNC: 27 MG/DL (ref 9–23)
BUN BLD-MCNC: 28 MG/DL (ref 9–23)
BUN BLD-MCNC: 29 MG/DL (ref 9–23)
BUN BLD-MCNC: 30 MG/DL (ref 9–23)
BUN BLD-MCNC: 30 MG/DL (ref 9–23)
BUN BLD-MCNC: 31 MG/DL (ref 9–23)
BUN BLD-MCNC: 31 MG/DL (ref 9–23)
BUN BLD-MCNC: 32 MG/DL (ref 9–23)
BUN BLD-MCNC: 32 MG/DL (ref 9–23)
BUN BLD-MCNC: 47 MG/DL (ref 9–23)
BUN BLD-MCNC: 60 MG/DL (ref 9–23)
C DIFF TOX B STL QL: NEGATIVE
C PNEUM DNA SPEC QL NAA+PROBE: NOT DETECTED
C PNEUM DNA SPEC QL NAA+PROBE: NOT DETECTED
CA-I BLD-SCNC: 1.8 MMOL/L (ref 0.95–1.32)
CA-I BLD-SCNC: 1.84 MMOL/L (ref 0.95–1.32)
CALCIUM BLD-MCNC: 10 MG/DL (ref 8.5–10.1)
CALCIUM BLD-MCNC: 10.9 MG/DL (ref 8.5–10.1)
CALCIUM BLD-MCNC: 12.6 MG/DL (ref 8.5–10.1)
CALCIUM BLD-MCNC: 12.7 MG/DL (ref 8.5–10.1)
CALCIUM BLD-MCNC: 7.9 MG/DL (ref 8.5–10.1)
CALCIUM BLD-MCNC: 8 MG/DL (ref 8.5–10.1)
CALCIUM BLD-MCNC: 8.2 MG/DL (ref 8.5–10.1)
CALCIUM BLD-MCNC: 8.5 MG/DL (ref 8.5–10.1)
CALCIUM BLD-MCNC: 8.5 MG/DL (ref 8.5–10.1)
CALCIUM BLD-MCNC: 8.7 MG/DL (ref 8.5–10.1)
CALCIUM BLD-MCNC: 8.8 MG/DL (ref 8.5–10.1)
CALCIUM BLD-MCNC: 9 MG/DL (ref 8.5–10.1)
CALCIUM BLD-MCNC: 9.6 MG/DL (ref 8.5–10.1)
CHLORIDE SERPL-SCNC: 100 MMOL/L (ref 98–112)
CHLORIDE SERPL-SCNC: 101 MMOL/L (ref 98–112)
CHLORIDE SERPL-SCNC: 102 MMOL/L (ref 98–112)
CHLORIDE SERPL-SCNC: 103 MMOL/L (ref 98–112)
CHLORIDE SERPL-SCNC: 104 MMOL/L (ref 98–112)
CHLORIDE SERPL-SCNC: 104 MMOL/L (ref 98–112)
CHLORIDE SERPL-SCNC: 106 MMOL/L (ref 98–112)
CMV IGG AB: >10 U/ML
CMV IGM AB: <30 AU/ML
CO2 SERPL-SCNC: 16 MMOL/L (ref 21–32)
CO2 SERPL-SCNC: 19 MMOL/L (ref 21–32)
CO2 SERPL-SCNC: 20 MMOL/L (ref 21–32)
CO2 SERPL-SCNC: 21 MMOL/L (ref 21–32)
CO2 SERPL-SCNC: 22 MMOL/L (ref 21–32)
COHGB MFR BLD: 2 % SAT (ref 0–3)
COHGB MFR BLD: 2.3 % SAT (ref 0–3)
COHGB MFR BLD: 2.3 % SAT (ref 0–3)
COLOR FLD: YELLOW
COLOR UR AUTO: YELLOW
CORONAVIRUS 229E PCR:: NOT DETECTED
CORONAVIRUS 229E PCR:: NOT DETECTED
CORONAVIRUS HKU1 PCR:: NOT DETECTED
CORONAVIRUS HKU1 PCR:: NOT DETECTED
CORONAVIRUS NL63 PCR:: NOT DETECTED
CORONAVIRUS NL63 PCR:: NOT DETECTED
CORONAVIRUS OC43 PCR:: NOT DETECTED
CORONAVIRUS OC43 PCR:: NOT DETECTED
CREAT BLD-MCNC: 1.21 MG/DL
CREAT BLD-MCNC: 1.22 MG/DL
CREAT BLD-MCNC: 1.26 MG/DL
CREAT BLD-MCNC: 1.28 MG/DL
CREAT BLD-MCNC: 1.29 MG/DL
CREAT BLD-MCNC: 1.33 MG/DL
CREAT BLD-MCNC: 1.34 MG/DL
CREAT BLD-MCNC: 1.35 MG/DL
CREAT BLD-MCNC: 1.37 MG/DL
CREAT BLD-MCNC: 1.43 MG/DL
CREAT BLD-MCNC: 1.45 MG/DL
CREAT BLD-MCNC: 1.77 MG/DL
CREAT BLD-MCNC: 1.95 MG/DL
CRP SERPL-MCNC: 10.3 MG/DL (ref ?–0.3)
CRP SERPL-MCNC: 9.64 MG/DL (ref ?–0.3)
DSDNA IGG SERPL IA-ACNC: 2 IU/ML
EBV NA IGG SER QL IA: NEGATIVE
EBV VCA IGG SER QL IA: POSITIVE
EBV VCA IGM SER QL IA: NEGATIVE
EGFRCR SERPLBLD CKD-EPI 2021: 33 ML/MIN/1.73M2 (ref 60–?)
EGFRCR SERPLBLD CKD-EPI 2021: 37 ML/MIN/1.73M2 (ref 60–?)
EGFRCR SERPLBLD CKD-EPI 2021: 47 ML/MIN/1.73M2 (ref 60–?)
EGFRCR SERPLBLD CKD-EPI 2021: 48 ML/MIN/1.73M2 (ref 60–?)
EGFRCR SERPLBLD CKD-EPI 2021: 50 ML/MIN/1.73M2 (ref 60–?)
EGFRCR SERPLBLD CKD-EPI 2021: 51 ML/MIN/1.73M2 (ref 60–?)
EGFRCR SERPLBLD CKD-EPI 2021: 52 ML/MIN/1.73M2 (ref 60–?)
EGFRCR SERPLBLD CKD-EPI 2021: 52 ML/MIN/1.73M2 (ref 60–?)
EGFRCR SERPLBLD CKD-EPI 2021: 54 ML/MIN/1.73M2 (ref 60–?)
EGFRCR SERPLBLD CKD-EPI 2021: 55 ML/MIN/1.73M2 (ref 60–?)
EGFRCR SERPLBLD CKD-EPI 2021: 56 ML/MIN/1.73M2 (ref 60–?)
EGFRCR SERPLBLD CKD-EPI 2021: 58 ML/MIN/1.73M2 (ref 60–?)
EGFRCR SERPLBLD CKD-EPI 2021: 58 ML/MIN/1.73M2 (ref 60–?)
ENA AB SER QL IA: 0.4 UG/L
ENA AB SER QL IA: NEGATIVE
EOSINOPHIL # BLD AUTO: 0 X10(3) UL (ref 0–0.7)
EOSINOPHIL # BLD AUTO: 0.01 X10(3) UL (ref 0–0.7)
EOSINOPHIL # BLD AUTO: 0.01 X10(3) UL (ref 0–0.7)
EOSINOPHIL NFR BLD AUTO: 0 %
EOSINOPHIL NFR BLD AUTO: 0.1 %
EOSINOPHIL NFR BLD AUTO: 0.1 %
EOSINOPHIL NFR PLR: 0 %
EOSINOPHIL NFR PLR: 0 %
ERYTHROCYTE [DISTWIDTH] IN BLOOD BY AUTOMATED COUNT: 16.9 %
ERYTHROCYTE [DISTWIDTH] IN BLOOD BY AUTOMATED COUNT: 16.9 %
ERYTHROCYTE [DISTWIDTH] IN BLOOD BY AUTOMATED COUNT: 17 %
ERYTHROCYTE [DISTWIDTH] IN BLOOD BY AUTOMATED COUNT: 17.1 %
ERYTHROCYTE [DISTWIDTH] IN BLOOD BY AUTOMATED COUNT: 17.2 %
ERYTHROCYTE [DISTWIDTH] IN BLOOD BY AUTOMATED COUNT: 17.3 %
ERYTHROCYTE [DISTWIDTH] IN BLOOD BY AUTOMATED COUNT: 17.5 %
ERYTHROCYTE [DISTWIDTH] IN BLOOD BY AUTOMATED COUNT: 18.5 %
ERYTHROCYTE [DISTWIDTH] IN BLOOD BY AUTOMATED COUNT: 18.6 %
ERYTHROCYTE [DISTWIDTH] IN BLOOD BY AUTOMATED COUNT: 18.8 %
ERYTHROCYTE [SEDIMENTATION RATE] IN BLOOD: 10 MM/HR
EXPIRATORY PRESSURE: 5 CM H2O
FIO2: 50 %
FLUAV + FLUBV RNA SPEC NAA+PROBE: NEGATIVE
FLUAV + FLUBV RNA SPEC NAA+PROBE: NEGATIVE
FLUAV RNA SPEC QL NAA+PROBE: NOT DETECTED
FLUAV RNA SPEC QL NAA+PROBE: NOT DETECTED
FLUBV RNA SPEC QL NAA+PROBE: NOT DETECTED
FLUBV RNA SPEC QL NAA+PROBE: NOT DETECTED
GLOBULIN PLAS-MCNC: 3.5 G/DL (ref 2.8–4.4)
GLOBULIN PLAS-MCNC: 3.6 G/DL (ref 2.8–4.4)
GLOBULIN PLAS-MCNC: 3.6 G/DL (ref 2.8–4.4)
GLOBULIN PLAS-MCNC: 3.7 G/DL (ref 2.8–4.4)
GLOBULIN PLAS-MCNC: 3.8 G/DL (ref 2.8–4.4)
GLOBULIN PLAS-MCNC: 3.9 G/DL (ref 2.8–4.4)
GLOBULIN PLAS-MCNC: 4 G/DL (ref 2.8–4.4)
GLOBULIN PLAS-MCNC: 4.1 G/DL (ref 2.8–4.4)
GLOBULIN PLAS-MCNC: 4.4 G/DL (ref 2.8–4.4)
GLUCOSE BLD-MCNC: 121 MG/DL (ref 70–99)
GLUCOSE BLD-MCNC: 122 MG/DL (ref 70–99)
GLUCOSE BLD-MCNC: 124 MG/DL (ref 70–99)
GLUCOSE BLD-MCNC: 127 MG/DL (ref 70–99)
GLUCOSE BLD-MCNC: 130 MG/DL (ref 70–99)
GLUCOSE BLD-MCNC: 131 MG/DL (ref 70–99)
GLUCOSE BLD-MCNC: 138 MG/DL (ref 70–99)
GLUCOSE BLD-MCNC: 146 MG/DL (ref 70–99)
GLUCOSE BLD-MCNC: 151 MG/DL (ref 70–99)
GLUCOSE BLD-MCNC: 152 MG/DL (ref 70–99)
GLUCOSE BLD-MCNC: 157 MG/DL (ref 70–99)
GLUCOSE BLD-MCNC: 158 MG/DL (ref 70–99)
GLUCOSE BLD-MCNC: 159 MG/DL (ref 70–99)
GLUCOSE BLD-MCNC: 160 MG/DL (ref 70–99)
GLUCOSE BLD-MCNC: 160 MG/DL (ref 70–99)
GLUCOSE BLD-MCNC: 162 MG/DL (ref 70–99)
GLUCOSE BLD-MCNC: 164 MG/DL (ref 70–99)
GLUCOSE BLD-MCNC: 165 MG/DL (ref 70–99)
GLUCOSE BLD-MCNC: 166 MG/DL (ref 70–99)
GLUCOSE BLD-MCNC: 169 MG/DL (ref 70–99)
GLUCOSE BLD-MCNC: 172 MG/DL (ref 70–99)
GLUCOSE BLD-MCNC: 173 MG/DL (ref 70–99)
GLUCOSE BLD-MCNC: 175 MG/DL (ref 70–99)
GLUCOSE BLD-MCNC: 175 MG/DL (ref 70–99)
GLUCOSE BLD-MCNC: 176 MG/DL (ref 70–99)
GLUCOSE BLD-MCNC: 176 MG/DL (ref 70–99)
GLUCOSE BLD-MCNC: 178 MG/DL (ref 70–99)
GLUCOSE BLD-MCNC: 179 MG/DL (ref 70–99)
GLUCOSE BLD-MCNC: 195 MG/DL (ref 70–99)
GLUCOSE BLD-MCNC: 197 MG/DL (ref 70–99)
GLUCOSE BLD-MCNC: 200 MG/DL (ref 70–99)
GLUCOSE BLD-MCNC: 201 MG/DL (ref 70–99)
GLUCOSE BLD-MCNC: 202 MG/DL (ref 70–99)
GLUCOSE BLD-MCNC: 202 MG/DL (ref 70–99)
GLUCOSE BLD-MCNC: 206 MG/DL (ref 70–99)
GLUCOSE BLD-MCNC: 212 MG/DL (ref 70–99)
GLUCOSE BLD-MCNC: 212 MG/DL (ref 70–99)
GLUCOSE BLD-MCNC: 215 MG/DL (ref 70–99)
GLUCOSE BLD-MCNC: 216 MG/DL (ref 70–99)
GLUCOSE BLD-MCNC: 217 MG/DL (ref 70–99)
GLUCOSE BLD-MCNC: 217 MG/DL (ref 70–99)
GLUCOSE BLD-MCNC: 218 MG/DL (ref 70–99)
GLUCOSE BLD-MCNC: 224 MG/DL (ref 70–99)
GLUCOSE BLD-MCNC: 224 MG/DL (ref 70–99)
GLUCOSE BLD-MCNC: 227 MG/DL (ref 70–99)
GLUCOSE BLD-MCNC: 231 MG/DL (ref 70–99)
GLUCOSE BLD-MCNC: 233 MG/DL (ref 70–99)
GLUCOSE BLD-MCNC: 235 MG/DL (ref 70–99)
GLUCOSE BLD-MCNC: 240 MG/DL (ref 70–99)
GLUCOSE BLD-MCNC: 240 MG/DL (ref 70–99)
GLUCOSE BLD-MCNC: 243 MG/DL (ref 70–99)
GLUCOSE BLD-MCNC: 257 MG/DL (ref 70–99)
GLUCOSE BLD-MCNC: 258 MG/DL (ref 70–99)
GLUCOSE BLD-MCNC: 275 MG/DL (ref 70–99)
GLUCOSE BLD-MCNC: 93 MG/DL (ref 70–99)
GLUCOSE PLR-MCNC: 196 MG/DL
GLUCOSE PLR-MCNC: 208 MG/DL
GLUCOSE UR STRIP.AUTO-MCNC: 1000 MG/DL
GLUCOSE UR STRIP.AUTO-MCNC: 30 MG/DL
GLUCOSE UR STRIP.AUTO-MCNC: 30 MG/DL
HCO3 BLDA-SCNC: 17.2 MEQ/L (ref 21–27)
HCO3 BLDA-SCNC: 19.7 MEQ/L (ref 21–27)
HCO3 BLDA-SCNC: 22.6 MEQ/L (ref 21–27)
HCT VFR BLD AUTO: 30.2 %
HCT VFR BLD AUTO: 32.7 %
HCT VFR BLD AUTO: 33.4 %
HCT VFR BLD AUTO: 33.5 %
HCT VFR BLD AUTO: 33.7 %
HCT VFR BLD AUTO: 34.3 %
HCT VFR BLD AUTO: 34.3 %
HCT VFR BLD AUTO: 35 %
HCT VFR BLD AUTO: 35.2 %
HCT VFR BLD AUTO: 35.2 %
HCT VFR BLD AUTO: 35.6 %
HCT VFR BLD AUTO: 38.1 %
HGB BLD-MCNC: 10.2 G/DL
HGB BLD-MCNC: 10.7 G/DL
HGB BLD-MCNC: 10.8 G/DL
HGB BLD-MCNC: 11 G/DL
HGB BLD-MCNC: 11.2 G/DL
HGB BLD-MCNC: 11.4 G/DL
HGB BLD-MCNC: 11.6 G/DL
HGB BLD-MCNC: 11.7 G/DL
HGB BLD-MCNC: 11.7 G/DL
HGB BLD-MCNC: 11.8 G/DL
HGB BLD-MCNC: 12 G/DL
HGB BLD-MCNC: 12.2 G/DL
HGB BLD-MCNC: 12.4 G/DL
IMM GRANULOCYTES # BLD AUTO: 0.03 X10(3) UL (ref 0–1)
IMM GRANULOCYTES # BLD AUTO: 0.04 X10(3) UL (ref 0–1)
IMM GRANULOCYTES # BLD AUTO: 0.04 X10(3) UL (ref 0–1)
IMM GRANULOCYTES # BLD AUTO: 0.07 X10(3) UL (ref 0–1)
IMM GRANULOCYTES # BLD AUTO: 0.07 X10(3) UL (ref 0–1)
IMM GRANULOCYTES # BLD AUTO: 0.09 X10(3) UL (ref 0–1)
IMM GRANULOCYTES # BLD AUTO: 0.13 X10(3) UL (ref 0–1)
IMM GRANULOCYTES # BLD AUTO: 0.15 X10(3) UL (ref 0–1)
IMM GRANULOCYTES # BLD AUTO: 0.19 X10(3) UL (ref 0–1)
IMM GRANULOCYTES NFR BLD: 0.6 %
IMM GRANULOCYTES NFR BLD: 0.6 %
IMM GRANULOCYTES NFR BLD: 0.7 %
IMM GRANULOCYTES NFR BLD: 0.8 %
IMM GRANULOCYTES NFR BLD: 0.9 %
IMM GRANULOCYTES NFR BLD: 1 %
IMM GRANULOCYTES NFR BLD: 1.1 %
IMM GRANULOCYTES NFR BLD: 1.2 %
IMM GRANULOCYTES NFR BLD: 1.2 %
INR BLD: 1.4 (ref 0.8–1.2)
INR BLD: 1.43 (ref 0.8–1.2)
INR BLD: 1.49 (ref 0.8–1.2)
INR BLD: 1.97 (ref 0.8–1.2)
INR BLD: 3.27 (ref 0.8–1.2)
INR BLD: 8.16 (ref 0.8–1.2)
IPAP MAX: 30 CM H2O
IPAP MIN: 15 CM H2O
KETONES UR STRIP.AUTO-MCNC: NEGATIVE MG/DL
L/M: 12 L/MIN
L/M: 4 L/MIN
LACTATE BLD-SCNC: 6.1 MMOL/L (ref 0.5–2)
LACTATE BLD-SCNC: 8 MMOL/L (ref 0.5–2)
LACTATE BLD-SCNC: 9 MMOL/L (ref 0.5–2)
LACTATE SERPL-SCNC: 1.9 MMOL/L (ref 0.4–2)
LACTATE SERPL-SCNC: 2.7 MMOL/L (ref 0.4–2)
LACTATE SERPL-SCNC: 3.1 MMOL/L (ref 0.4–2)
LACTATE SERPL-SCNC: 3.5 MMOL/L (ref 0.4–2)
LACTATE SERPL-SCNC: 3.7 MMOL/L (ref 0.4–2)
LACTATE SERPL-SCNC: 3.7 MMOL/L (ref 0.4–2)
LACTATE SERPL-SCNC: 4.8 MMOL/L (ref 0.4–2)
LACTATE SERPL-SCNC: 6.6 MMOL/L (ref 0.4–2)
LDH FLD L TO P-CCNC: 107 U/L
LDH FLD L TO P-CCNC: 93 U/L
LDH SERPL L TO P-CCNC: 260 U/L
LEUKOCYTE ESTERASE UR QL STRIP.AUTO: 500
LIPASE SERPL-CCNC: 18 U/L (ref ?–300)
LYMPHOCYTES # BLD AUTO: 0.2 X10(3) UL (ref 1–4)
LYMPHOCYTES # BLD AUTO: 0.5 X10(3) UL (ref 1–4)
LYMPHOCYTES # BLD AUTO: 0.72 X10(3) UL (ref 1–4)
LYMPHOCYTES # BLD AUTO: 0.77 X10(3) UL (ref 1–4)
LYMPHOCYTES # BLD AUTO: 0.81 X10(3) UL (ref 1–4)
LYMPHOCYTES # BLD AUTO: 1.2 X10(3) UL (ref 1–4)
LYMPHOCYTES # BLD AUTO: 1.23 X10(3) UL (ref 1–4)
LYMPHOCYTES # BLD AUTO: 1.26 X10(3) UL (ref 1–4)
LYMPHOCYTES # BLD AUTO: 2.42 X10(3) UL (ref 1–4)
LYMPHOCYTES NFR BLD AUTO: 12.1 %
LYMPHOCYTES NFR BLD AUTO: 12.5 %
LYMPHOCYTES NFR BLD AUTO: 13.7 %
LYMPHOCYTES NFR BLD AUTO: 13.8 %
LYMPHOCYTES NFR BLD AUTO: 15.3 %
LYMPHOCYTES NFR BLD AUTO: 16.7 %
LYMPHOCYTES NFR BLD AUTO: 2 %
LYMPHOCYTES NFR BLD AUTO: 7.2 %
LYMPHOCYTES NFR BLD AUTO: 7.3 %
LYMPHOCYTES NFR PLR: 17 %
LYMPHOCYTES NFR PLR: 79 %
MCH RBC QN AUTO: 22.6 PG (ref 26–34)
MCH RBC QN AUTO: 22.7 PG (ref 26–34)
MCH RBC QN AUTO: 22.8 PG (ref 26–34)
MCH RBC QN AUTO: 22.8 PG (ref 26–34)
MCH RBC QN AUTO: 23 PG (ref 26–34)
MCH RBC QN AUTO: 23.2 PG (ref 26–34)
MCH RBC QN AUTO: 23.3 PG (ref 26–34)
MCH RBC QN AUTO: 23.4 PG (ref 26–34)
MCH RBC QN AUTO: 23.6 PG (ref 26–34)
MCH RBC QN AUTO: 24 PG (ref 26–34)
MCHC RBC AUTO-ENTMCNC: 31.4 G/DL (ref 31–37)
MCHC RBC AUTO-ENTMCNC: 32.2 G/DL (ref 31–37)
MCHC RBC AUTO-ENTMCNC: 32.5 G/DL (ref 31–37)
MCHC RBC AUTO-ENTMCNC: 32.7 G/DL (ref 31–37)
MCHC RBC AUTO-ENTMCNC: 33 G/DL (ref 31–37)
MCHC RBC AUTO-ENTMCNC: 33.5 G/DL (ref 31–37)
MCHC RBC AUTO-ENTMCNC: 33.5 G/DL (ref 31–37)
MCHC RBC AUTO-ENTMCNC: 33.7 G/DL (ref 31–37)
MCHC RBC AUTO-ENTMCNC: 33.8 G/DL (ref 31–37)
MCHC RBC AUTO-ENTMCNC: 33.8 G/DL (ref 31–37)
MCHC RBC AUTO-ENTMCNC: 34.1 G/DL (ref 31–37)
MCHC RBC AUTO-ENTMCNC: 34.4 G/DL (ref 31–37)
MCV RBC AUTO: 67.2 FL
MCV RBC AUTO: 67.4 FL
MCV RBC AUTO: 67.6 FL
MCV RBC AUTO: 67.9 FL
MCV RBC AUTO: 68 FL
MCV RBC AUTO: 69 FL
MCV RBC AUTO: 69.2 FL
MCV RBC AUTO: 69.8 FL
MCV RBC AUTO: 70.8 FL
MCV RBC AUTO: 71.1 FL
MCV RBC AUTO: 71.4 FL
MCV RBC AUTO: 73.8 FL
MESOTHL CELL NFR PLR: 1 %
METAPNEUMOVIRUS PCR:: NOT DETECTED
METAPNEUMOVIRUS PCR:: NOT DETECTED
METHGB MFR BLD: 0.3 % SAT (ref 0.4–1.5)
METHGB MFR BLD: 0.5 % SAT (ref 0.4–1.5)
METHGB MFR BLD: 0.5 % SAT (ref 0.4–1.5)
MONOCYTES # BLD AUTO: 0.11 X10(3) UL (ref 0.1–1)
MONOCYTES # BLD AUTO: 0.11 X10(3) UL (ref 0.1–1)
MONOCYTES # BLD AUTO: 0.16 X10(3) UL (ref 0.1–1)
MONOCYTES # BLD AUTO: 0.17 X10(3) UL (ref 0.1–1)
MONOCYTES # BLD AUTO: 0.19 X10(3) UL (ref 0.1–1)
MONOCYTES # BLD AUTO: 0.25 X10(3) UL (ref 0.1–1)
MONOCYTES # BLD AUTO: 0.29 X10(3) UL (ref 0.1–1)
MONOCYTES # BLD AUTO: 0.4 X10(3) UL (ref 0.1–1)
MONOCYTES # BLD AUTO: 0.84 X10(3) UL (ref 0.1–1)
MONOCYTES NFR BLD AUTO: 1.1 %
MONOCYTES NFR BLD AUTO: 1.6 %
MONOCYTES NFR BLD AUTO: 1.9 %
MONOCYTES NFR BLD AUTO: 2.4 %
MONOCYTES NFR BLD AUTO: 2.8 %
MONOCYTES NFR BLD AUTO: 3.1 %
MONOCYTES NFR BLD AUTO: 3.4 %
MONOCYTES NFR BLD AUTO: 4.2 %
MONOCYTES NFR BLD AUTO: 5.8 %
MONOS+MACROS NFR PLR: 14 %
MONOS+MACROS NFR PLR: 77 %
MRSA DNA SPEC QL NAA+PROBE: NEGATIVE
MYCOPLASMA PNEUMONIA PCR:: NOT DETECTED
MYCOPLASMA PNEUMONIA PCR:: NOT DETECTED
NEUTROPHILS # BLD AUTO: 10.98 X10 (3) UL (ref 1.5–7.7)
NEUTROPHILS # BLD AUTO: 10.98 X10(3) UL (ref 1.5–7.7)
NEUTROPHILS # BLD AUTO: 14.73 X10 (3) UL (ref 1.5–7.7)
NEUTROPHILS # BLD AUTO: 14.73 X10(3) UL (ref 1.5–7.7)
NEUTROPHILS # BLD AUTO: 4.04 X10 (3) UL (ref 1.5–7.7)
NEUTROPHILS # BLD AUTO: 4.04 X10(3) UL (ref 1.5–7.7)
NEUTROPHILS # BLD AUTO: 4.31 X10 (3) UL (ref 1.5–7.7)
NEUTROPHILS # BLD AUTO: 4.31 X10(3) UL (ref 1.5–7.7)
NEUTROPHILS # BLD AUTO: 4.75 X10 (3) UL (ref 1.5–7.7)
NEUTROPHILS # BLD AUTO: 4.75 X10(3) UL (ref 1.5–7.7)
NEUTROPHILS # BLD AUTO: 6.18 X10 (3) UL (ref 1.5–7.7)
NEUTROPHILS # BLD AUTO: 6.18 X10(3) UL (ref 1.5–7.7)
NEUTROPHILS # BLD AUTO: 8.39 X10 (3) UL (ref 1.5–7.7)
NEUTROPHILS # BLD AUTO: 8.39 X10(3) UL (ref 1.5–7.7)
NEUTROPHILS # BLD AUTO: 8.73 X10 (3) UL (ref 1.5–7.7)
NEUTROPHILS # BLD AUTO: 8.73 X10(3) UL (ref 1.5–7.7)
NEUTROPHILS # BLD AUTO: 9.71 X10 (3) UL (ref 1.5–7.7)
NEUTROPHILS # BLD AUTO: 9.71 X10(3) UL (ref 1.5–7.7)
NEUTROPHILS NFR BLD AUTO: 76.1 %
NEUTROPHILS NFR BLD AUTO: 80.3 %
NEUTROPHILS NFR BLD AUTO: 80.6 %
NEUTROPHILS NFR BLD AUTO: 82.3 %
NEUTROPHILS NFR BLD AUTO: 83.5 %
NEUTROPHILS NFR BLD AUTO: 85.6 %
NEUTROPHILS NFR BLD AUTO: 89 %
NEUTROPHILS NFR BLD AUTO: 90.4 %
NEUTROPHILS NFR BLD AUTO: 95 %
NEUTROPHILS NFR PLR: 6 %
NEUTROPHILS NFR PLR: 6 %
NITRITE UR QL STRIP.AUTO: NEGATIVE
NON GYNE INTERPRETATION: NEGATIVE
NON GYNE INTERPRETATION: NEGATIVE
NT-PROBNP SERPL-MCNC: 9971 PG/ML (ref ?–450)
NT-PROBNP SERPL-MCNC: ABNORMAL PG/ML (ref ?–450)
NT-PROBNP SERPL-MCNC: ABNORMAL PG/ML (ref ?–450)
OSMOLALITY SERPL CALC.SUM OF ELEC: 274 MOSM/KG (ref 275–295)
OSMOLALITY SERPL CALC.SUM OF ELEC: 275 MOSM/KG (ref 275–295)
OSMOLALITY SERPL CALC.SUM OF ELEC: 278 MOSM/KG (ref 275–295)
OSMOLALITY SERPL CALC.SUM OF ELEC: 279 MOSM/KG (ref 275–295)
OSMOLALITY SERPL CALC.SUM OF ELEC: 281 MOSM/KG (ref 275–295)
OSMOLALITY SERPL CALC.SUM OF ELEC: 282 MOSM/KG (ref 275–295)
OSMOLALITY SERPL CALC.SUM OF ELEC: 283 MOSM/KG (ref 275–295)
OSMOLALITY SERPL CALC.SUM OF ELEC: 284 MOSM/KG (ref 275–295)
OSMOLALITY SERPL CALC.SUM OF ELEC: 285 MOSM/KG (ref 275–295)
OSMOLALITY SERPL CALC.SUM OF ELEC: 286 MOSM/KG (ref 275–295)
OSMOLALITY SERPL CALC.SUM OF ELEC: 292 MOSM/KG (ref 275–295)
OXYHGB MFR BLDA: 81.7 % (ref 92–100)
OXYHGB MFR BLDA: 90.2 % (ref 92–100)
OXYHGB MFR BLDA: 90.4 % (ref 92–100)
P/F RATIO: 132 MMHG
PARAINFLUENZA 1 PCR:: NOT DETECTED
PARAINFLUENZA 1 PCR:: NOT DETECTED
PARAINFLUENZA 2 PCR:: NOT DETECTED
PARAINFLUENZA 2 PCR:: NOT DETECTED
PARAINFLUENZA 3 PCR:: NOT DETECTED
PARAINFLUENZA 3 PCR:: NOT DETECTED
PARAINFLUENZA 4 PCR:: NOT DETECTED
PARAINFLUENZA 4 PCR:: NOT DETECTED
PCO2 BLDA: 27 MM HG (ref 35–45)
PCO2 BLDA: 28 MM HG (ref 35–45)
PCO2 BLDA: 29 MM HG (ref 35–45)
PH BLDA: 7.34 [PH] (ref 7.35–7.45)
PH BLDA: 7.4 [PH] (ref 7.35–7.45)
PH BLDA: 7.45 [PH] (ref 7.35–7.45)
PH PLR: 7.44 [PH] (ref 7.2–7.5)
PH PLR: 7.48 [PH] (ref 7.2–7.5)
PH UR STRIP.AUTO: 5.5 [PH] (ref 5–8)
PH UR STRIP.AUTO: 6 [PH] (ref 5–8)
PH UR STRIP.AUTO: 6 [PH] (ref 5–8)
PLATELET # BLD AUTO: 170 10(3)UL (ref 150–450)
PLATELET # BLD AUTO: 188 10(3)UL (ref 150–450)
PLATELET # BLD AUTO: 194 10(3)UL (ref 150–450)
PLATELET # BLD AUTO: 200 10(3)UL (ref 150–450)
PLATELET # BLD AUTO: 201 10(3)UL (ref 150–450)
PLATELET # BLD AUTO: 213 10(3)UL (ref 150–450)
PLATELET # BLD AUTO: 228 10(3)UL (ref 150–450)
PLATELET # BLD AUTO: 231 10(3)UL (ref 150–450)
PLATELET # BLD AUTO: 234 10(3)UL (ref 150–450)
PLATELET # BLD AUTO: 247 10(3)UL (ref 150–450)
PLATELET # BLD AUTO: 247 10(3)UL (ref 150–450)
PLATELET # BLD AUTO: 250 10(3)UL (ref 150–450)
PLATELET MORPHOLOGY: NORMAL
PLATELET MORPHOLOGY: NORMAL
PO2 BLDA: 52 MM HG (ref 80–100)
PO2 BLDA: 60 MM HG (ref 80–100)
PO2 BLDA: 66 MM HG (ref 80–100)
POTASSIUM BLD-SCNC: 5 MMOL/L (ref 3.6–5.1)
POTASSIUM BLD-SCNC: 5.7 MMOL/L (ref 3.6–5.1)
POTASSIUM SERPL-SCNC: 3.1 MMOL/L (ref 3.5–5.1)
POTASSIUM SERPL-SCNC: 3.3 MMOL/L (ref 3.5–5.1)
POTASSIUM SERPL-SCNC: 3.3 MMOL/L (ref 3.5–5.1)
POTASSIUM SERPL-SCNC: 3.4 MMOL/L (ref 3.5–5.1)
POTASSIUM SERPL-SCNC: 3.4 MMOL/L (ref 3.5–5.1)
POTASSIUM SERPL-SCNC: 3.7 MMOL/L (ref 3.5–5.1)
POTASSIUM SERPL-SCNC: 3.7 MMOL/L (ref 3.5–5.1)
POTASSIUM SERPL-SCNC: 3.9 MMOL/L (ref 3.5–5.1)
POTASSIUM SERPL-SCNC: 4 MMOL/L (ref 3.5–5.1)
POTASSIUM SERPL-SCNC: 4.1 MMOL/L (ref 3.5–5.1)
POTASSIUM SERPL-SCNC: 4.2 MMOL/L (ref 3.5–5.1)
POTASSIUM SERPL-SCNC: 4.3 MMOL/L (ref 3.5–5.1)
POTASSIUM SERPL-SCNC: 4.4 MMOL/L (ref 3.5–5.1)
POTASSIUM SERPL-SCNC: 4.8 MMOL/L (ref 3.5–5.1)
POTASSIUM SERPL-SCNC: 5.1 MMOL/L (ref 3.5–5.1)
PROCALCITONIN SERPL-MCNC: 1.53 NG/ML (ref ?–0.16)
PROCALCITONIN SERPL-MCNC: 1.64 NG/ML (ref ?–0.16)
PROT PLR-MCNC: 1.5 G/DL
PROT PLR-MCNC: 1.8 G/DL
PROT SERPL-MCNC: 4.9 G/DL (ref 6.4–8.2)
PROT SERPL-MCNC: 4.9 G/DL (ref 6.4–8.2)
PROT SERPL-MCNC: 5.2 G/DL (ref 6.4–8.2)
PROT SERPL-MCNC: 5.4 G/DL (ref 6.4–8.2)
PROT SERPL-MCNC: 5.5 G/DL (ref 6.4–8.2)
PROT SERPL-MCNC: 5.5 G/DL (ref 6.4–8.2)
PROT SERPL-MCNC: 5.7 G/DL (ref 6.4–8.2)
PROT SERPL-MCNC: 5.8 G/DL (ref 6.4–8.2)
PROT SERPL-MCNC: 6 G/DL (ref 6.4–8.2)
PROT SERPL-MCNC: 6.1 G/DL (ref 6.4–8.2)
PROT SERPL-MCNC: 6.5 G/DL (ref 6.4–8.2)
PROT SERPL-MCNC: 6.9 G/DL (ref 6.4–8.2)
PROT UR STRIP.AUTO-MCNC: 30 MG/DL
PROT UR STRIP.AUTO-MCNC: 30 MG/DL
PROT UR STRIP.AUTO-MCNC: 50 MG/DL
PROTHROMBIN TIME: 17.2 SECONDS (ref 11.6–14.8)
PROTHROMBIN TIME: 17.5 SECONDS (ref 11.6–14.8)
PROTHROMBIN TIME: 18.2 SECONDS (ref 11.6–14.8)
PROTHROMBIN TIME: 22.6 SECONDS (ref 11.6–14.8)
PROTHROMBIN TIME: 33.8 SECONDS (ref 11.6–14.8)
PROTHROMBIN TIME: 69.8 SECONDS (ref 11.6–14.8)
Q-T INTERVAL: 278 MS
Q-T INTERVAL: 294 MS
Q-T INTERVAL: 400 MS
QRS DURATION: 76 MS
QRS DURATION: 78 MS
QRS DURATION: 82 MS
QTC CALCULATION (BEZET): 446 MS
QTC CALCULATION (BEZET): 450 MS
QTC CALCULATION (BEZET): 484 MS
R AXIS: 75 DEGREES
R AXIS: 87 DEGREES
R AXIS: 93 DEGREES
RBC # BLD AUTO: 4.25 X10(6)UL
RBC # BLD AUTO: 4.69 X10(6)UL
RBC # BLD AUTO: 4.74 X10(6)UL
RBC # BLD AUTO: 4.74 X10(6)UL
RBC # BLD AUTO: 4.91 X10(6)UL
RBC # BLD AUTO: 4.96 X10(6)UL
RBC # BLD AUTO: 4.97 X10(6)UL
RBC # BLD AUTO: 5 X10(6)UL
RBC # BLD AUTO: 5.05 X10(6)UL
RBC # BLD AUTO: 5.21 X10(6)UL
RBC # BLD AUTO: 5.3 X10(6)UL
RBC # BLD AUTO: 5.46 X10(6)UL
RBC #/AREA URNS AUTO: >10 /HPF
RBC #/AREA URNS AUTO: >10 /HPF
RBC PLEURAL FLUID: <3000 /MM3
RBC PLEURAL FLUID: NORMAL /MM3
RHEUMATOID FACT SERPL-ACNC: <10 IU/ML (ref ?–15)
RHINOVIRUS/ENTERO PCR:: NOT DETECTED
RHINOVIRUS/ENTERO PCR:: NOT DETECTED
RSV RNA SPEC NAA+PROBE: NEGATIVE
RSV RNA SPEC QL NAA+PROBE: NOT DETECTED
RSV RNA SPEC QL NAA+PROBE: NOT DETECTED
SARS-COV-2 RNA NPH QL NAA+NON-PROBE: NOT DETECTED
SARS-COV-2 RNA NPH QL NAA+NON-PROBE: NOT DETECTED
SARS-COV-2 RNA RESP QL NAA+PROBE: NOT DETECTED
SODIUM BLD-SCNC: 125 MMOL/L (ref 135–145)
SODIUM BLD-SCNC: 125 MMOL/L (ref 135–145)
SODIUM SERPL-SCNC: 127 MMOL/L (ref 136–145)
SODIUM SERPL-SCNC: 128 MMOL/L (ref 136–145)
SODIUM SERPL-SCNC: 129 MMOL/L (ref 136–145)
SODIUM SERPL-SCNC: 129 MMOL/L (ref 136–145)
SODIUM SERPL-SCNC: 130 MMOL/L (ref 136–145)
SODIUM SERPL-SCNC: 131 MMOL/L (ref 136–145)
SODIUM SERPL-SCNC: 132 MMOL/L (ref 136–145)
SODIUM SERPL-SCNC: 132 MMOL/L (ref 136–145)
SODIUM SERPL-SCNC: 133 MMOL/L (ref 136–145)
SP GR UR STRIP.AUTO: 1.01 (ref 1–1.03)
SP GR UR STRIP.AUTO: 1.01 (ref 1–1.03)
SP GR UR STRIP.AUTO: 1.02 (ref 1–1.03)
T AXIS: 23 DEGREES
T AXIS: 27 DEGREES
T AXIS: 39 DEGREES
TIDAL VOLUME: 600 ML
TOTAL CELLS COUNTED FLD: 100
TOTAL CELLS COUNTED FLD: 100
TROPONIN I SERPL HS-MCNC: 39 NG/L
TURBIDITY CSF QL: CLEAR
UROBILINOGEN UR STRIP.AUTO-MCNC: NORMAL MG/DL
VENT RATE: 16 /MIN
VENTRICULAR RATE: 141 BPM
VENTRICULAR RATE: 155 BPM
VENTRICULAR RATE: 88 BPM
WBC # BLD AUTO: 10.2 X10(3) UL (ref 4–11)
WBC # BLD AUTO: 10.5 X10(3) UL (ref 4–11)
WBC # BLD AUTO: 12.5 X10(3) UL (ref 4–11)
WBC # BLD AUTO: 14.5 X10(3) UL (ref 4–11)
WBC # BLD AUTO: 16.6 X10(3) UL (ref 4–11)
WBC # BLD AUTO: 17.5 X10(3) UL (ref 4–11)
WBC # BLD AUTO: 4.7 X10(3) UL (ref 4–11)
WBC # BLD AUTO: 5 X10(3) UL (ref 4–11)
WBC # BLD AUTO: 5.2 X10(3) UL (ref 4–11)
WBC # BLD AUTO: 5.9 X10(3) UL (ref 4–11)
WBC # BLD AUTO: 6.8 X10(3) UL (ref 4–11)
WBC # BLD AUTO: 9.8 X10(3) UL (ref 4–11)
WBC # PLR: 144 /MM3
WBC # PLR: 145 /MM3
WBC #/AREA URNS AUTO: >50 /HPF
WBC CLUMPS UR QL AUTO: PRESENT /HPF
YEAST UR QL: PRESENT /HPF
YEAST UR QL: PRESENT /HPF

## 2024-01-01 PROCEDURE — 99291 CRITICAL CARE FIRST HOUR: CPT | Performed by: NURSE PRACTITIONER

## 2024-01-01 PROCEDURE — 99291 CRITICAL CARE FIRST HOUR: CPT | Performed by: INTERNAL MEDICINE

## 2024-01-01 PROCEDURE — 5A09457 ASSISTANCE WITH RESPIRATORY VENTILATION, 24-96 CONSECUTIVE HOURS, CONTINUOUS POSITIVE AIRWAY PRESSURE: ICD-10-PCS | Performed by: INTERNAL MEDICINE

## 2024-01-01 PROCEDURE — 76770 US EXAM ABDO BACK WALL COMP: CPT | Performed by: INTERNAL MEDICINE

## 2024-01-01 PROCEDURE — 99233 SBSQ HOSP IP/OBS HIGH 50: CPT | Performed by: INTERNAL MEDICINE

## 2024-01-01 PROCEDURE — 93970 EXTREMITY STUDY: CPT | Performed by: HOSPITALIST

## 2024-01-01 PROCEDURE — 5A0945A ASSISTANCE WITH RESPIRATORY VENTILATION, 24-96 CONSECUTIVE HOURS, HIGH NASAL FLOW/VELOCITY: ICD-10-PCS | Performed by: STUDENT IN AN ORGANIZED HEALTH CARE EDUCATION/TRAINING PROGRAM

## 2024-01-01 PROCEDURE — 0W993ZZ DRAINAGE OF RIGHT PLEURAL CAVITY, PERCUTANEOUS APPROACH: ICD-10-PCS | Performed by: INTERNAL MEDICINE

## 2024-01-01 PROCEDURE — 76700 US EXAM ABDOM COMPLETE: CPT | Performed by: INTERNAL MEDICINE

## 2024-01-01 PROCEDURE — 32555 ASPIRATE PLEURA W/ IMAGING: CPT | Performed by: NURSE PRACTITIONER

## 2024-01-01 PROCEDURE — 32555 ASPIRATE PLEURA W/ IMAGING: CPT | Performed by: INTERNAL MEDICINE

## 2024-01-01 PROCEDURE — 93306 TTE W/DOPPLER COMPLETE: CPT | Performed by: STUDENT IN AN ORGANIZED HEALTH CARE EDUCATION/TRAINING PROGRAM

## 2024-01-01 PROCEDURE — 71045 X-RAY EXAM CHEST 1 VIEW: CPT | Performed by: NURSE PRACTITIONER

## 2024-01-01 PROCEDURE — 76700 US EXAM ABDOM COMPLETE: CPT | Performed by: NURSE PRACTITIONER

## 2024-01-01 PROCEDURE — 71045 X-RAY EXAM CHEST 1 VIEW: CPT

## 2024-01-01 PROCEDURE — 0W9B3ZZ DRAINAGE OF LEFT PLEURAL CAVITY, PERCUTANEOUS APPROACH: ICD-10-PCS | Performed by: RADIOLOGY

## 2024-01-01 PROCEDURE — B54NZZA ULTRASONOGRAPHY OF LEFT UPPER EXTREMITY VEINS, GUIDANCE: ICD-10-PCS | Performed by: HOSPITALIST

## 2024-01-01 PROCEDURE — 05HC33Z INSERTION OF INFUSION DEVICE INTO LEFT BASILIC VEIN, PERCUTANEOUS APPROACH: ICD-10-PCS | Performed by: HOSPITALIST

## 2024-01-01 PROCEDURE — 71045 X-RAY EXAM CHEST 1 VIEW: CPT | Performed by: INTERNAL MEDICINE

## 2024-01-01 PROCEDURE — 74177 CT ABD & PELVIS W/CONTRAST: CPT | Performed by: INTERNAL MEDICINE

## 2024-01-01 PROCEDURE — 71250 CT THORAX DX C-: CPT | Performed by: INTERNAL MEDICINE

## 2024-01-01 PROCEDURE — 74177 CT ABD & PELVIS W/CONTRAST: CPT | Performed by: EMERGENCY MEDICINE

## 2024-01-01 PROCEDURE — 71045 X-RAY EXAM CHEST 1 VIEW: CPT | Performed by: RADIOLOGY

## 2024-01-01 RX ORDER — MEPERIDINE HYDROCHLORIDE 25 MG/ML
INJECTION INTRAMUSCULAR; INTRAVENOUS; SUBCUTANEOUS
Status: DISPENSED
Start: 2024-01-01 | End: 2024-01-01

## 2024-01-01 RX ORDER — PHENYLEPHRINE HCL IN 0.9% NACL 50MG/250ML
PLASTIC BAG, INJECTION (ML) INTRAVENOUS CONTINUOUS
Status: DISCONTINUED | OUTPATIENT
Start: 2024-01-01 | End: 2024-01-01

## 2024-01-01 RX ORDER — POLYETHYLENE GLYCOL 3350 17 G/17G
17 POWDER, FOR SOLUTION ORAL DAILY PRN
Status: DISCONTINUED | OUTPATIENT
Start: 2024-01-01 | End: 2024-01-01

## 2024-01-01 RX ORDER — POTASSIUM CHLORIDE 20 MEQ/1
20 TABLET, EXTENDED RELEASE ORAL ONCE
Status: COMPLETED | OUTPATIENT
Start: 2024-01-01 | End: 2024-01-01

## 2024-01-01 RX ORDER — POTASSIUM CHLORIDE 1.5 G/1.58G
40 POWDER, FOR SOLUTION ORAL EVERY 4 HOURS
Status: COMPLETED | OUTPATIENT
Start: 2024-01-01 | End: 2024-01-01

## 2024-01-01 RX ORDER — DILTIAZEM HYDROCHLORIDE 5 MG/ML
10 INJECTION INTRAVENOUS EVERY 6 HOURS PRN
Status: DISCONTINUED | OUTPATIENT
Start: 2024-01-01 | End: 2024-01-01

## 2024-01-01 RX ORDER — METOPROLOL TARTRATE 1 MG/ML
5 INJECTION, SOLUTION INTRAVENOUS ONCE
Status: COMPLETED | OUTPATIENT
Start: 2024-01-01 | End: 2024-01-01

## 2024-01-01 RX ORDER — ACETAMINOPHEN 10 MG/ML
1000 INJECTION, SOLUTION INTRAVENOUS EVERY 6 HOURS PRN
Status: DISCONTINUED | OUTPATIENT
Start: 2024-01-01 | End: 2024-01-01

## 2024-01-01 RX ORDER — MEPERIDINE HYDROCHLORIDE 25 MG/ML
12.5 INJECTION INTRAMUSCULAR; INTRAVENOUS; SUBCUTANEOUS ONCE
Status: COMPLETED | OUTPATIENT
Start: 2024-01-01 | End: 2024-01-01

## 2024-01-01 RX ORDER — ACETAMINOPHEN 500 MG
500 TABLET ORAL EVERY 4 HOURS PRN
Status: DISCONTINUED | OUTPATIENT
Start: 2024-01-01 | End: 2024-01-01

## 2024-01-01 RX ORDER — LORAZEPAM 2 MG/ML
1 CONCENTRATE ORAL EVERY 30 MIN PRN
Status: DISCONTINUED | OUTPATIENT
Start: 2024-01-01 | End: 2024-03-09

## 2024-01-01 RX ORDER — ACETAMINOPHEN 325 MG/1
650 TABLET ORAL EVERY 4 HOURS PRN
Status: DISCONTINUED | OUTPATIENT
Start: 2024-01-01 | End: 2024-01-01

## 2024-01-01 RX ORDER — FUROSEMIDE 10 MG/ML
20 INJECTION INTRAMUSCULAR; INTRAVENOUS DAILY
Status: DISCONTINUED | OUTPATIENT
Start: 2024-01-01 | End: 2024-01-01

## 2024-01-01 RX ORDER — HALOPERIDOL 5 MG/ML
2 INJECTION INTRAMUSCULAR EVERY 2 HOUR PRN
Status: DISCONTINUED | OUTPATIENT
Start: 2024-01-01 | End: 2024-03-09

## 2024-01-01 RX ORDER — FUROSEMIDE 10 MG/ML
40 INJECTION INTRAMUSCULAR; INTRAVENOUS DAILY
Status: DISCONTINUED | OUTPATIENT
Start: 2024-01-01 | End: 2024-01-01

## 2024-01-01 RX ORDER — ACETAMINOPHEN 325 MG/1
650 TABLET ORAL EVERY 4 HOURS PRN
Status: DISCONTINUED | OUTPATIENT
Start: 2024-01-01 | End: 2024-03-09

## 2024-01-01 RX ORDER — METOCLOPRAMIDE HYDROCHLORIDE 5 MG/ML
10 INJECTION INTRAMUSCULAR; INTRAVENOUS EVERY 8 HOURS PRN
Status: DISCONTINUED | OUTPATIENT
Start: 2024-01-01 | End: 2024-01-01

## 2024-01-01 RX ORDER — ACETAMINOPHEN 650 MG/1
650 SUPPOSITORY RECTAL EVERY 4 HOURS PRN
Status: DISCONTINUED | OUTPATIENT
Start: 2024-01-01 | End: 2024-03-09

## 2024-01-01 RX ORDER — METOPROLOL TARTRATE 1 MG/ML
5 INJECTION, SOLUTION INTRAVENOUS EVERY 6 HOURS PRN
Status: DISCONTINUED | OUTPATIENT
Start: 2024-01-01 | End: 2024-01-01

## 2024-01-01 RX ORDER — METOPROLOL TARTRATE 1 MG/ML
INJECTION, SOLUTION INTRAVENOUS
Status: COMPLETED
Start: 2024-01-01 | End: 2024-01-01

## 2024-01-01 RX ORDER — IOHEXOL 350 MG/ML
80 INJECTION, SOLUTION INTRAVENOUS
Status: COMPLETED | OUTPATIENT
Start: 2024-01-01 | End: 2024-01-01

## 2024-01-01 RX ORDER — DEXTROSE MONOHYDRATE 25 G/50ML
50 INJECTION, SOLUTION INTRAVENOUS
Status: DISCONTINUED | OUTPATIENT
Start: 2024-01-01 | End: 2024-01-01

## 2024-01-01 RX ORDER — HYDROCODONE BITARTRATE AND ACETAMINOPHEN 5; 325 MG/1; MG/1
2 TABLET ORAL EVERY 4 HOURS PRN
Status: DISCONTINUED | OUTPATIENT
Start: 2024-01-01 | End: 2024-01-01

## 2024-01-01 RX ORDER — ONDANSETRON 2 MG/ML
4 INJECTION INTRAMUSCULAR; INTRAVENOUS EVERY 6 HOURS PRN
Status: DISCONTINUED | OUTPATIENT
Start: 2024-01-01 | End: 2024-03-09

## 2024-01-01 RX ORDER — TRAMADOL HYDROCHLORIDE 50 MG/1
50 TABLET ORAL DAILY
COMMUNITY
Start: 2024-01-01

## 2024-01-01 RX ORDER — NICOTINE POLACRILEX 4 MG
30 LOZENGE BUCCAL
Status: DISCONTINUED | OUTPATIENT
Start: 2024-01-01 | End: 2024-01-01

## 2024-01-01 RX ORDER — MULTIPLE VITAMINS W/ MINERALS TAB 9MG-400MCG
1 TAB ORAL DAILY
Status: DISCONTINUED | OUTPATIENT
Start: 2024-01-01 | End: 2024-01-01

## 2024-01-01 RX ORDER — HYDROCODONE BITARTRATE AND ACETAMINOPHEN 5; 325 MG/1; MG/1
1 TABLET ORAL EVERY 4 HOURS PRN
Status: DISCONTINUED | OUTPATIENT
Start: 2024-01-01 | End: 2024-01-01

## 2024-01-01 RX ORDER — FINASTERIDE 5 MG/1
5 TABLET, FILM COATED ORAL DAILY
COMMUNITY

## 2024-01-01 RX ORDER — METOCLOPRAMIDE HYDROCHLORIDE 5 MG/ML
5 INJECTION INTRAMUSCULAR; INTRAVENOUS EVERY 8 HOURS PRN
Status: DISCONTINUED | OUTPATIENT
Start: 2024-01-01 | End: 2024-01-01

## 2024-01-01 RX ORDER — ACETAMINOPHEN 160 MG/5ML
650 SOLUTION ORAL EVERY 4 HOURS PRN
Status: DISCONTINUED | OUTPATIENT
Start: 2024-01-01 | End: 2024-03-09

## 2024-01-01 RX ORDER — INSULIN DEGLUDEC 100 U/ML
5 INJECTION, SOLUTION SUBCUTANEOUS DAILY
Status: DISCONTINUED | OUTPATIENT
Start: 2024-01-01 | End: 2024-01-01

## 2024-01-01 RX ORDER — FUROSEMIDE 10 MG/ML
20 INJECTION INTRAMUSCULAR; INTRAVENOUS ONCE
Status: COMPLETED | OUTPATIENT
Start: 2024-01-01 | End: 2024-01-01

## 2024-01-01 RX ORDER — FUROSEMIDE 10 MG/ML
INJECTION INTRAMUSCULAR; INTRAVENOUS
Status: COMPLETED
Start: 2024-01-01 | End: 2024-01-01

## 2024-01-01 RX ORDER — SODIUM CHLORIDE 9 MG/ML
INJECTION, SOLUTION INTRAVENOUS CONTINUOUS
Status: DISCONTINUED | OUTPATIENT
Start: 2024-01-01 | End: 2024-01-01

## 2024-01-01 RX ORDER — FUROSEMIDE 10 MG/ML
40 INJECTION INTRAMUSCULAR; INTRAVENOUS ONCE
Status: DISCONTINUED | OUTPATIENT
Start: 2024-01-01 | End: 2024-01-01

## 2024-01-01 RX ORDER — IPRATROPIUM BROMIDE AND ALBUTEROL SULFATE 2.5; .5 MG/3ML; MG/3ML
3 SOLUTION RESPIRATORY (INHALATION) EVERY 6 HOURS PRN
Status: DISCONTINUED | OUTPATIENT
Start: 2024-01-01 | End: 2024-03-09

## 2024-01-01 RX ORDER — FUROSEMIDE 10 MG/ML
40 INJECTION INTRAMUSCULAR; INTRAVENOUS ONCE
Status: COMPLETED | OUTPATIENT
Start: 2024-01-01 | End: 2024-01-01

## 2024-01-01 RX ORDER — LEVOTHYROXINE SODIUM 0.03 MG/1
25 TABLET ORAL
Status: DISCONTINUED | OUTPATIENT
Start: 2024-01-01 | End: 2024-01-01

## 2024-01-01 RX ORDER — NICOTINE POLACRILEX 4 MG
15 LOZENGE BUCCAL
Status: DISCONTINUED | OUTPATIENT
Start: 2024-01-01 | End: 2024-01-01

## 2024-01-01 RX ORDER — DILTIAZEM HYDROCHLORIDE 120 MG/1
240 CAPSULE, EXTENDED RELEASE ORAL DAILY
Status: DISCONTINUED | OUTPATIENT
Start: 2024-01-01 | End: 2024-01-01

## 2024-01-01 RX ORDER — FUROSEMIDE 10 MG/ML
20 INJECTION INTRAMUSCULAR; INTRAVENOUS
Status: COMPLETED | OUTPATIENT
Start: 2024-01-01 | End: 2024-01-01

## 2024-01-01 RX ORDER — DILTIAZEM HYDROCHLORIDE 180 MG/1
180 CAPSULE, EXTENDED RELEASE ORAL DAILY
Status: DISCONTINUED | OUTPATIENT
Start: 2024-01-01 | End: 2024-01-01

## 2024-01-01 RX ORDER — ATORVASTATIN CALCIUM 40 MG/1
40 TABLET, FILM COATED ORAL NIGHTLY
Status: DISCONTINUED | OUTPATIENT
Start: 2024-01-01 | End: 2024-01-01

## 2024-01-01 RX ORDER — SENNOSIDES 8.6 MG
17.2 TABLET ORAL NIGHTLY PRN
Status: DISCONTINUED | OUTPATIENT
Start: 2024-01-01 | End: 2024-01-01

## 2024-01-01 RX ORDER — FUROSEMIDE 10 MG/ML
20 INJECTION INTRAMUSCULAR; INTRAVENOUS ONCE
Status: DISCONTINUED | OUTPATIENT
Start: 2024-01-01 | End: 2024-01-01

## 2024-01-01 RX ORDER — MORPHINE SULFATE 2 MG/ML
1 INJECTION, SOLUTION INTRAMUSCULAR; INTRAVENOUS
Status: DISCONTINUED | OUTPATIENT
Start: 2024-01-01 | End: 2024-03-09

## 2024-01-01 RX ORDER — ACETAMINOPHEN 10 MG/ML
1000 INJECTION, SOLUTION INTRAVENOUS EVERY 6 HOURS PRN
Status: DISCONTINUED | OUTPATIENT
Start: 2024-01-01 | End: 2024-03-09

## 2024-01-01 RX ORDER — ENEMA 19; 7 G/133ML; G/133ML
1 ENEMA RECTAL ONCE AS NEEDED
Status: DISCONTINUED | OUTPATIENT
Start: 2024-01-01 | End: 2024-01-01

## 2024-01-01 RX ORDER — BISACODYL 10 MG
10 SUPPOSITORY, RECTAL RECTAL
Status: DISCONTINUED | OUTPATIENT
Start: 2024-01-01 | End: 2024-01-01

## 2024-01-01 RX ORDER — METOPROLOL TARTRATE 1 MG/ML
5 INJECTION, SOLUTION INTRAVENOUS EVERY 6 HOURS
Status: DISCONTINUED | OUTPATIENT
Start: 2024-01-01 | End: 2024-01-01

## 2024-01-01 RX ORDER — SCOLOPAMINE TRANSDERMAL SYSTEM 1 MG/1
1 PATCH, EXTENDED RELEASE TRANSDERMAL
Status: DISCONTINUED | OUTPATIENT
Start: 2024-01-01 | End: 2024-03-09

## 2024-01-01 RX ORDER — VANCOMYCIN HYDROCHLORIDE
15 EVERY 24 HOURS
Status: DISCONTINUED | OUTPATIENT
Start: 2024-01-01 | End: 2024-01-01

## 2024-01-01 RX ORDER — POTASSIUM CHLORIDE 14.9 MG/ML
20 INJECTION INTRAVENOUS ONCE
Status: COMPLETED | OUTPATIENT
Start: 2024-01-01 | End: 2024-01-01

## 2024-02-20 ENCOUNTER — LAB REQUISITION (OUTPATIENT)
Dept: LAB | Facility: HOSPITAL | Age: 89
End: 2024-02-20
Payer: MEDICARE

## 2024-02-20 DIAGNOSIS — E11.22 TYPE 2 DIABETES MELLITUS WITH DIABETIC CHRONIC KIDNEY DISEASE (HCC): ICD-10-CM

## 2024-02-20 DIAGNOSIS — E11.59 TYPE 2 DIABETES MELLITUS WITH OTHER CIRCULATORY COMPLICATIONS (HCC): ICD-10-CM

## 2024-02-20 DIAGNOSIS — N18.31 CHRONIC KIDNEY DISEASE, STAGE 3A (HCC): ICD-10-CM

## 2024-02-20 LAB
ALBUMIN SERPL-MCNC: 2.8 G/DL (ref 3.4–5)
ALBUMIN/GLOB SERPL: 0.6 {RATIO} (ref 1–2)
ALP LIVER SERPL-CCNC: 173 U/L
ALT SERPL-CCNC: 29 U/L
ANION GAP SERPL CALC-SCNC: 6 MMOL/L (ref 0–18)
AST SERPL-CCNC: 24 U/L (ref 15–37)
BASOPHILS # BLD AUTO: 0.02 X10(3) UL (ref 0–0.2)
BASOPHILS NFR BLD AUTO: 0.3 %
BILIRUB SERPL-MCNC: 0.5 MG/DL (ref 0.1–2)
BUN BLD-MCNC: 23 MG/DL (ref 9–23)
CALCIUM BLD-MCNC: 8.4 MG/DL (ref 8.5–10.1)
CHLORIDE SERPL-SCNC: 100 MMOL/L (ref 98–112)
CO2 SERPL-SCNC: 30 MMOL/L (ref 21–32)
CREAT BLD-MCNC: 1.09 MG/DL
EGFRCR SERPLBLD CKD-EPI 2021: 66 ML/MIN/1.73M2 (ref 60–?)
EOSINOPHIL # BLD AUTO: 0.02 X10(3) UL (ref 0–0.7)
EOSINOPHIL NFR BLD AUTO: 0.3 %
ERYTHROCYTE [DISTWIDTH] IN BLOOD BY AUTOMATED COUNT: 17 %
EST. AVERAGE GLUCOSE BLD GHB EST-MCNC: 203 MG/DL (ref 68–126)
GLOBULIN PLAS-MCNC: 4.5 G/DL (ref 2.8–4.4)
GLUCOSE BLD-MCNC: 114 MG/DL (ref 70–99)
HBA1C MFR BLD: 8.7 % (ref ?–5.7)
HCT VFR BLD AUTO: 32.1 %
HGB BLD-MCNC: 10.4 G/DL
IMM GRANULOCYTES # BLD AUTO: 0.04 X10(3) UL (ref 0–1)
IMM GRANULOCYTES NFR BLD: 0.6 %
LYMPHOCYTES # BLD AUTO: 1.7 X10(3) UL (ref 1–4)
LYMPHOCYTES NFR BLD AUTO: 27.2 %
MCH RBC QN AUTO: 23.7 PG (ref 26–34)
MCHC RBC AUTO-ENTMCNC: 32.4 G/DL (ref 31–37)
MCV RBC AUTO: 73.1 FL
MONOCYTES # BLD AUTO: 0.61 X10(3) UL (ref 0.1–1)
MONOCYTES NFR BLD AUTO: 9.8 %
NEUTROPHILS # BLD AUTO: 3.85 X10 (3) UL (ref 1.5–7.7)
NEUTROPHILS # BLD AUTO: 3.85 X10(3) UL (ref 1.5–7.7)
NEUTROPHILS NFR BLD AUTO: 61.8 %
OSMOLALITY SERPL CALC.SUM OF ELEC: 287 MOSM/KG (ref 275–295)
PLATELET # BLD AUTO: 315 10(3)UL (ref 150–450)
POTASSIUM SERPL-SCNC: 3.6 MMOL/L (ref 3.5–5.1)
PROT SERPL-MCNC: 7.3 G/DL (ref 6.4–8.2)
RBC # BLD AUTO: 4.39 X10(6)UL
SODIUM SERPL-SCNC: 136 MMOL/L (ref 136–145)
WBC # BLD AUTO: 6.2 X10(3) UL (ref 4–11)

## 2024-02-20 PROCEDURE — 83036 HEMOGLOBIN GLYCOSYLATED A1C: CPT | Performed by: INTERNAL MEDICINE

## 2024-02-20 PROCEDURE — 80053 COMPREHEN METABOLIC PANEL: CPT | Performed by: INTERNAL MEDICINE

## 2024-02-20 PROCEDURE — 85025 COMPLETE CBC W/AUTO DIFF WBC: CPT | Performed by: INTERNAL MEDICINE

## 2024-02-26 PROBLEM — E87.20 LACTIC ACID ACIDOSIS: Status: ACTIVE | Noted: 2024-02-26

## 2024-02-26 PROBLEM — N39.0 URINARY TRACT INFECTION WITHOUT HEMATURIA, SITE UNSPECIFIED: Status: ACTIVE | Noted: 2024-01-01

## 2024-02-26 PROBLEM — E86.0 DEHYDRATION: Status: ACTIVE | Noted: 2024-02-26

## 2024-02-26 PROBLEM — E87.20 LACTIC ACID ACIDOSIS: Status: ACTIVE | Noted: 2024-01-01

## 2024-02-26 PROBLEM — R50.9 FEVER, UNSPECIFIED FEVER CAUSE: Status: ACTIVE | Noted: 2024-01-01

## 2024-02-26 PROBLEM — N39.0 URINARY TRACT INFECTION WITHOUT HEMATURIA, SITE UNSPECIFIED: Status: ACTIVE | Noted: 2024-02-26

## 2024-02-26 PROBLEM — R50.9 FEVER, UNSPECIFIED FEVER CAUSE: Status: ACTIVE | Noted: 2024-02-26

## 2024-02-26 PROBLEM — R74.8 ELEVATED LIVER ENZYMES: Status: ACTIVE | Noted: 2024-01-01

## 2024-02-26 PROBLEM — R74.8 ELEVATED LIVER ENZYMES: Status: ACTIVE | Noted: 2024-02-26

## 2024-02-26 PROBLEM — E86.0 DEHYDRATION: Status: ACTIVE | Noted: 2024-01-01

## 2024-02-26 NOTE — ED QUICK NOTES
Patient's son states that his father states that pain is much better since administration of Fentanyl

## 2024-02-26 NOTE — ED QUICK NOTES
Finished obtaining cultures and CT came to take patient.  Plan to start antibiotic as soon as patient returns.

## 2024-02-26 NOTE — ED QUICK NOTES
Orders for admission, patient is aware of plan and ready to go upstairs. Any questions, please call ED RN Patito at extension [phone].     Patient Covid vaccination status: Fully vaccinated     COVID Test Ordered in ED: SARS-CoV-2/Flu A and B/RSV by PCR (GeneXpert)    COVID Suspicion at Admission: N/A    Running Infusions:    sodium chloride 2,178 mL (02/26/24 1435)        Mental Status/LOC at time of transport: A/Ox4. Patient fatigued.  Patient understands English however does not speak it.  Family at bedside translating.      Other pertinent information:   CIWA score: N/A   NIH score:  N/A

## 2024-02-26 NOTE — ED INITIAL ASSESSMENT (HPI)
Fever since 2 days  with shortness of breath .  Shivering at home tylenol  given 4 hours ago . History of heart problem pacemaker in place . Very weak  and fatigue.

## 2024-02-26 NOTE — SEPSIS REASSESSMENT
[facility]    Sepsis Reassessment Note    BP 93/52   Pulse 68   Temp 98.7 °F (37.1 °C) (Temporal)   Resp (!) 27   Ht 177.8 cm (5' 10\")   Wt 72.6 kg   SpO2 96%   BMI 22.96 kg/m²      I completed the sepsis reassessment at 1611    Cardiac:  Regularity: Regular  Rate: Normal  Heart Sounds: S1,S2    Lungs:   Right: Clear  Left: Clear    Peripheral Pulses:  Radial: Right 1+ or Left 1+      Capillary Refill:  <3 Secs    Skin:  Temp/Moisture: Warm and Dry  Color: Normal      Mello Galvin MD  2/26/2024  4:11 PM

## 2024-02-26 NOTE — ED QUICK NOTES
Patient's son states that patient is having intense abdominal pain, 8/10.  Dr. Galvin notified and Fentanyl has been ordered

## 2024-02-27 PROBLEM — I50.32 CHRONIC HEART FAILURE WITH PRESERVED EJECTION FRACTION (HCC): Status: ACTIVE | Noted: 2024-01-01

## 2024-02-27 PROBLEM — R79.1 ELEVATED INR: Status: ACTIVE | Noted: 2024-01-01

## 2024-02-27 PROBLEM — N12 PYELONEPHRITIS: Status: ACTIVE | Noted: 2024-01-01

## 2024-02-27 PROBLEM — R79.89 ELEVATED LFTS: Status: ACTIVE | Noted: 2024-01-01

## 2024-02-27 PROBLEM — I48.11 LONGSTANDING PERSISTENT ATRIAL FIBRILLATION (HCC): Status: ACTIVE | Noted: 2024-01-01

## 2024-02-27 PROBLEM — R10.11 RIGHT UPPER QUADRANT ABDOMINAL PAIN: Status: ACTIVE | Noted: 2024-01-01

## 2024-02-27 NOTE — H&P
[facility]       CC:   Chief Complaint   Patient presents with    Fever    Fatigue        PCP: Agus Martinez MD    History of Present Illness: Patient is a 88 year old male with PMH sig for hypothyroidism, type 2 diabetes, bradycardia status post pacemaker, A-fib on Xarelto who presents for evaluation of fever chills and generalized weakness from home.  History is provided by son who is at bedside.  Per son, patient has been feeling ill for the past few days.  He denies any chest pain shortness of breath nausea vomiting but endorses fevers and chills as well as right flank abdominal pain.  I evaluated the patient in the ER.  His vitals upon presentation significant for low blood pressure, status post 2 L of IV fluids.  Labs concerning for elevated lactate, electrolyte disturbances, elevated liver enzymes as well as elevated proBNP.  Troponins negative upon presentation.  Patient was admitted for further workup and management.  However upon arrival to the floors, given tenuous status he was transferred to the ICU.    PMH  Past Medical History:   Diagnosis Date    Arrhythmia     BRADYCARDIA VS AFIB    Disorder of thyroid     Endocrine disorder     hypothyroid    Fracture of left calcaneus 6/29/2016    Resolved last addressed  7/25/16    Hearing impairment     High blood pressure     Osteoarthrosis, unspecified whether generalized or localized, unspecified site     Stroke (HCC) July 26, 2014    TIA    Thyroid disease     Type II or unspecified type diabetes mellitus without mention of complication, not stated as uncontrolled     Unspecified essential hypertension     Unspecified sleep apnea     Visual impairment     glasses        PSH  Past Surgical History:   Procedure Laterality Date    CARDIAC PACEMAKER PLACEMENT      REMV CATARACT EXTRACAP,INSERT LENS Right 9/2/2015    Procedure: RIGHT PHACOEMULSIFICATION OF CATARACT WITH INTRAOCULAR LENS IMPLANT [id number];  Surgeon: Louis Jason MD;  [place] [facility]    REMV CATARACT EXTRACAP,INSERT LENS Left 2015    Procedure: LEFT PHACOEMULSIFICATION OF CATARACT WITH INTRAOCULAR LENS IMPLANT [id number];  Surgeon: Louis Jason MD;  Location: [facility]        ALL:  No Known Allergies     Home Medications:  Outpatient Medications Marked as Taking for the 24 encounter (Hospital Encounter)   Medication Sig Dispense Refill    traMADol 50 MG Oral Tab Take 1 tablet (50 mg total) by mouth daily.      finasteride 5 MG Oral Tab Take 1 tablet (5 mg total) by mouth daily.      metoprolol tartrate 50 MG Oral Tab Take 1 tablet (50 mg total) by mouth 2x Daily(Beta Blocker). 90 tablet 0    rivaroxaban (XARELTO) 15 MG Oral Tab TAKE 1 TABLET BY MOUTH DAILY WITH FOOD 90 tablet 0    Ferrous Sulfate 325 (65 Fe) MG Oral Tab Take 1 tablet (325 mg total) by mouth 2 (two) times daily.      dilTIAZem HCl  MG Oral Capsule SR 24 Hr Take 1 capsule (180 mg total) by mouth daily. 30 capsule 1    levothyroxine 25 MCG Oral Tab Take 1 tablet (25 mcg total) by mouth before breakfast.      tamsulosin (FLOMAX) cap TAKE 1 CAPSULE BY MOUTH DAILY (Patient taking differently: Take 1 capsule (0.4 mg total) by mouth at bedtime.) 90 capsule 3    JANUVIA 25 MG Oral Tab TAKE 1 TABLET(25 MG) BY MOUTH DAILY 90 tablet 3    ATORVASTATIN 40 MG Oral Tab TAKE 1 TABLET BY MOUTH EVERY NIGHT AT BEDTIME 90 tablet 3    METFORMIN HCL 1000 MG Oral Tab TAKE 1 TABLET BY MOUTH TWICE DAILY WITH MEALS 180 tablet 3    aspirin (ASPIRIN EC LOW DOSE) 81 MG Oral Tab EC Take 1 tablet (81 mg total) by mouth once daily. 90 tablet 3         Soc Hx  Social History     Tobacco Use    Smoking status: Former     Types: Cigarettes     Quit date: 1984     Years since quittin.6    Smokeless tobacco: Former    Tobacco comments:     tobacco   Substance Use Topics    Alcohol use: No        Fam Hx  Family History   Problem Relation Age of Onset    Cancer Brother        Review of Systems  Comprehensive  ROS reviewed and negative except for what's stated above.     OBJECTIVE:  /58 (BP Location: Left arm)   Pulse 74   Temp 98 °F (36.7 °C) (Oral)   Resp (!) 30   Ht 5' 10\" (1.778 m)   Wt 168 lb (76.2 kg)   SpO2 (!) 89%   BMI 24.11 kg/m²   General:  Alert, no distress, having rigors, ill-appearing   Head:  Normocephalic, without obvious abnormality, atraumatic.   Eyes:  Sclera anicteric, No conjunctival pallor, EOMs intact.    Nose: Nares normal. Septum midline. Mucosa normal. No drainage.   Throat: Lips, mucosa, and tongue normal. Teeth and gums normal.   Neck: Supple, symmetrical, trachea midline,   Lungs:   Clear to auscultation bilaterally. Normal effort   Chest wall:  No tenderness or deformity.   Heart:  Tachycardia   Abdomen:   Soft, non-tender. Bowel sounds normal. No masses,  No organomegaly. Non distended   Extremities: Moving all extremities, gait not assessed   Skin: Skin color, texture, turgor normal. No rashes or lesions.    Neurologic: Normal strength, no focal deficit appreciated     Diagnostic Data:    CBC/Chem  Recent Labs   Lab 02/20/24  1300 02/26/24  1200   WBC 6.2 10.2   HGB 10.4* 10.2*   MCV 73.1* 71.1*   .0 250.0       Recent Labs   Lab 02/20/24  1300 02/26/24  1200    132*   K 3.6 3.3*    100   CO2 30.0 22.0   BUN 23 29*   CREATSERUM 1.09 1.29   * 200*   CA 8.4* 8.5       Recent Labs   Lab 02/20/24  1300 02/26/24  1200   ALT 29 105*   AST 24 197*   ALB 2.8* 2.4*       No results for input(s): \"TROP\" in the last 168 hours.    CXR: image personally reviewed     Radiology: CT ABDOMEN+PELVIS(CONTRAST ONLY)(CPT=74177)    Result Date: 2/26/2024  CONCLUSION:   1. Geographic areas of cortical hypoattenuation throughout the left kidney, representing new findings compared to 11/06/2023.  Given advanced calcified atherosclerosis of the abdominal aorta and major arterial branches, findings may reflect multifocal renal cortical infarcts.  Differential includes  multifocal acute pyelonephritis.  Correlate with clinical evidence of active infection.  No organized intrarenal or perinephric collections.  2. Engorgement of intrahepatic IVC and hepatic veins with somewhat heterogeneous hepatic perfusion indicating passive hepatic congestion with associated periportal edema.  3. Mild right and small left pleural effusions.   Findings discussed with Dr. Galvin at 2:40 p.m. Central standard time on 02/26/2024.   LOCATION:  [facility]   Dictated by (CST): Eddie Hernandez MD on 2/26/2024 at 2:29 PM     Finalized by (CST): Eddie Hernandez MD on 2/26/2024 at 2:43 PM       XR CHEST AP PORTABLE  (CPT=71045)    Result Date: 2/26/2024  CONCLUSION:  Small left basilar consolidation may represent a developing pneumonia.   LOCATION:  [facility]      Dictated by (CST): Rich Zurita MD on 2/26/2024 at 1:08 PM     Finalized by (CST): Rich Zurita MD on 2/26/2024 at 1:09 PM          ASSESSMENT / PLAN:     Patient is a 88 year old male with PMH sig for hypothyroidism, type 2 diabetes, bradycardia status post pacemaker, A-fib on Xarelto who presents for evaluation of fever chills and generalized weakness from home.      Septic shock  Elevated lactate  Likely secondary to acute pyelonephritis, right  - Status post 2 L bolus in the ER, proBNP elevated, will hold further fluids  - Lactate elevated, repeat  - Broad-spectrum antibiotics, IV Zosyn, add IV vancomycin  - CT abdomen pelvis suggestive of acute pyelonephritis, UA grossly abnormal  - Blood cultures x 2 pending, urine cultures pending  - Started on gentle hydration however given elevated proBNP will stop for now, plan to transfer to the ICU, discussed with Dr. Polanco      Hypothyroidism  - Continue levothyroxine if able to take p.o.    Type 2 diabetes with hyperglycemia  - ISS, Accu-Cheks    Arrhythmia  Bradycardia status post pacemaker  A-fib  - Hold Xarelto given elevated INR  - Diltiazem and metoprolol to continue with parameters    Suspect  acute on chronic diastolic heart failure  - proBNP elevated  - Received IVF given septic shock  - Will hold diuretics at this time  - 2D echo  - Telemetry    Elevated liver enzymes  - Possibly from shock?  - Ultrasound liver    Advance care planning  - POA: Son  - CODE STATUS: DNR/select-discussed with patient and his family at bedside and on presentation      FN:  - IVF: Maintenance  - Diet: N.p.o. until swallow evaluation    DVT Prophy: SCDs  Atrophy: PT/OT  Lines: PIV    Dispo: Admit to ICU  Outpatient records or previous hospital records reviewed.     Further recommendations pending patient's clinical course.  [facility] hospitalist to continue to follow patient while in house    Patient and/or patient's family given opportunity to ask questions and note understanding and agreeing with therapeutic plan as outlined    Critical care time spent in taking care and coordinating care for this patient 40 minutes.    Thank You,  DO William Chino Hospitalist  Answering Service number: [phone]

## 2024-02-27 NOTE — PROGRESS NOTES
[facility] Pharmacy Dosing Service      Initial Pharmacokinetic Consult for Vancomycin Dosing     Mahsa Zhang is a 88 year old male who is being initiated on vancomycin therapy for sepsis.  Pharmacy has been asked to dose vancomycin by Dr. Hood.  The initial treatment and monitoring approach will be steady state AUC strategy.        Weight and Temperature:    Wt Readings from Last 1 Encounters:   24 76.2 kg (168 lb)        Temp Readings from Last 1 Encounters:   24 98 °F (36.7 °C) (Oral)      Labs:   Recent Labs   Lab 24  1300 24  1200   CREATSERUM 1.09 1.29      Estimated Creatinine Clearance: 42.4 mL/min (based on SCr of 1.29 mg/dL).     Recent Labs   Lab 24  1300 24  1200   WBC 6.2 10.2          The Pharmacokinetic Target is:       to 600 mg-h/L and trough <=15 mg/L    Renal Dosing Considerations:      None     Assessment/Plan:       Maintenance dose: Pharmacy will dose vancomycin at 1250 mg IV every 24 hours.    Monitorin) Plan for vancomycin peak and trough to be obtained at steady state.    2) Pharmacy will order SCr as clinically indicated to assess renal function.    3) Pharmacy will monitor for toxicity and efficacy, adjust vancomycin dose and/or frequency, and order vancomycin levels as appropriate per the Pharmacy and Therapeutics Committee approved protocol until discontinuation of the medication.       We appreciate the opportunity to assist in the care of this patient.     Maria L Orta, PharmD  2024  1:49 AM  [facility] Extension: [phone]

## 2024-02-27 NOTE — ED PROVIDER NOTES
Patient Seen in: [facility]ne-a      History     Chief Complaint   Patient presents with    Fever    Fatigue     Stated Complaint: fatigue, fever x 2 days, appears pale    Subjective:   HPI    88-year-old gentleman, history of diabetes hypertension, here for evaluation of intermittent fevers over the past few days, and decreased appetite, sleeping more than usual.  Son is at bedside provides all the history.  States his dad usually sleeps many hours per day, up to 18 hours a day sometimes however lately has been had limited oral intake.  No recent vomiting diarrhea any complaints of chest pain abdominal pain or any other concerning symptoms.  No recent hospitalizations or antibiotics.  Objective:   Past Medical History:   Diagnosis Date    Arrhythmia     BRADYCARDIA VS AFIB    Disorder of thyroid     Endocrine disorder     hypothyroid    Fracture of left calcaneus 6/29/2016    Resolved last addressed  7/25/16    Hearing impairment     High blood pressure     Osteoarthrosis, unspecified whether generalized or localized, unspecified site     Stroke (HCC) July 26, 2014    TIA    Thyroid disease     Type II or unspecified type diabetes mellitus without mention of complication, not stated as uncontrolled     Unspecified essential hypertension     Unspecified sleep apnea     Visual impairment     glasses              Past Surgical History:   Procedure Laterality Date    CARDIAC PACEMAKER PLACEMENT      REMV CATARACT EXTRACAP,INSERT LENS Right 9/2/2015    Procedure: RIGHT PHACOEMULSIFICATION OF CATARACT WITH INTRAOCULAR LENS IMPLANT 89444;  Surgeon: Louis Jason MD;  Location: [facility]    REMV CATARACT EXTRACAP,INSERT LENS Left 9/16/2015    Procedure: LEFT PHACOEMULSIFICATION OF CATARACT WITH INTRAOCULAR LENS IMPLANT [id number];  Surgeon: Louis Jason MD;  Location: [facility]                Social History     Socioeconomic History    Marital status:    Tobacco Use    Smoking  status: Former     Types: Cigarettes     Quit date: 1984     Years since quittin.6    Smokeless tobacco: Former    Tobacco comments:     tobacco   Vaping Use    Vaping Use: Never used   Substance and Sexual Activity    Alcohol use: No    Drug use: No     Social Determinants of Health     Food Insecurity: No Food Insecurity (2023)    Food Insecurity     Food Insecurity: Never true   Transportation Needs: No Transportation Needs (2023)    Transportation Needs     Lack of Transportation: No   Housing Stability: Low Risk  (2023)    Housing Stability     Housing Instability: No              Review of Systems    Positive for stated complaint: fatigue, fever x 2 days, appears pale  Other systems are as noted in HPI.  Constitutional and vital signs reviewed.      All other systems reviewed and negative except as noted above.    Physical Exam     ED Triage Vitals [24 1151]   BP 98/55   Pulse 92   Resp 19   Temp 98.7 °F (37.1 °C)   Temp src Temporal   SpO2 94 %   O2 Device None (Room air)       Current:/81 (BP Location: Left arm)   Pulse 75   Temp 97.8 °F (36.6 °C) (Oral)   Resp (!) 32   Ht 177.8 cm (5' 10\")   Wt 76.2 kg   SpO2 95%   BMI 24.11 kg/m²         Physical Exam        Physical Exam  Vitals signs and nursing note reviewed.   General: Elderly gentleman lying supine in the bed no acute distress.  Head: Normocephalic and atraumatic.   HEENT:  Mucous membranes are dry.   Cardiovascular:  Normal rate and regular rhythm.  No Edema  Pulmonary:  Pulmonary effort is normal.  Normal breath sounds. No wheezing, rhonchi or rales.   Abdominal: Abdomen soft, there is epigastric tenderness, no guarding or rebound tenderness.  Bowel sounds are normal.  Skin: Warm and dry  Neurological: Awake alert, speech is normal        ED Course     Labs Reviewed   COMP METABOLIC PANEL (14) - Abnormal; Notable for the following components:       Result Value    Glucose 200 (*)     Sodium 132 (*)      Potassium 3.3 (*)     BUN 29 (*)     eGFR-Cr 54 (*)      (*)      (*)     Alkaline Phosphatase 241 (*)     Albumin 2.4 (*)     A/G Ratio 0.6 (*)     All other components within normal limits   LACTIC ACID, PLASMA - Abnormal; Notable for the following components:    Lactic Acid 2.7 (*)     All other components within normal limits   LACTIC ACID REFLEX POST POSTIVE - Abnormal; Notable for the following components:    Lactic Acid 3.1 (*)     All other components within normal limits   PRO BETA NATRIURETIC PEPTIDE - Abnormal; Notable for the following components:    Pro-Beta Natriuretic Peptide 9,971 (*)     All other components within normal limits   URINALYSIS WITH CULTURE REFLEX - Abnormal; Notable for the following components:    Clarity Urine Turbid (*)     Glucose Urine 1000 (*)     Blood Urine 2+ (*)     Protein Urine 30 (*)     Leukocyte Esterase Urine 500 (*)     WBC Urine >50 (*)     RBC Urine >10 (*)     Squamous Epi. Cells Few (*)     WBC Clump Present (*)     All other components within normal limits   LACTIC ACID REFLEX POST POSITIVE ITV6189 - Abnormal; Notable for the following components:    Lactic Acid 6.6 (*)     All other components within normal limits   POCT GLUCOSE - Abnormal; Notable for the following components:    POC Glucose 224 (*)     All other components within normal limits   CBC W/ DIFFERENTIAL - Abnormal; Notable for the following components:    HGB 10.2 (*)     HCT 30.2 (*)     MCV 71.1 (*)     MCH 24.0 (*)     Neutrophil Absolute Prelim 9.71 (*)     Neutrophil Absolute 9.71 (*)     Lymphocyte Absolute 0.20 (*)     All other components within normal limits   TROPONIN I HIGH SENSITIVITY - Normal   SARS-COV-2/FLU A AND B/RSV BY PCR (GENEXPERT) - Normal    Narrative:     This test is intended for the qualitative detection and differentiation of SARS-CoV-2, influenza A, influenza B, and respiratory syncytial virus (RSV) viral RNA in nasopharyngeal or nares swabs from  individuals suspected of respiratory viral infection consistent with COVID-19 by their healthcare provider. Signs and symptoms of respiratory viral infection due to SARS-CoV-2, influenza, and RSV can be similar.    Test performed using the Xpert Xpress SARS-CoV-2/FLU/RSV (real time RT-PCR)  assay on the GeneXpert instrument, [employer], [place] [address].   This test is being used under the Food and Drug Administration's Emergency Use Authorization.    The authorized Fact Sheet for Healthcare Providers for this assay is available upon request from the laboratory.   CBC WITH DIFFERENTIAL WITH PLATELET    Narrative:     The following orders were created for panel order CBC With Differential With Platelet.  Procedure                               Abnormality         Status                     ---------                               -----------         ------                     CBC W/ DIFFERENTIAL[[id number]]          Abnormal            Final result                 Please view results for these tests on the individual orders.   COMP METABOLIC PANEL (14)   CBC WITH DIFFERENTIAL WITH PLATELET   RAINBOW DRAW LAVENDER   RAINBOW DRAW LIGHT GREEN   RAINBOW DRAW BLUE   BLOOD CULTURE   BLOOD CULTURE   URINE CULTURE, ROUTINE     EKG    Rate, intervals and axes as noted on EKG Report.  Rate: 88  Rhythm: Atrial Fibrillation  Reading: No acute ischemic changes               CT ABDOMEN+PELVIS(CONTRAST ONLY)(CPT=74177)    Result Date: 2/26/2024  PROCEDURE:  CT ABDOMEN+PELVIS (CONTRAST ONLY) (CPT=74177)  COMPARISON:  [facility] , CT, CTA CHEST + CT ABD (W) + CT PEL (W) SH(CPT=71275/93087), 11/06/2023, 7:17 PM.  INDICATIONS:  fever fatigue elevated liver enzymes, ttp upper abdomen  TECHNIQUE:  CT scanning was performed from the dome of the diaphragm to the pubic symphysis with non-ionic intravenous contrast material. Post contrast coronal MPR imaging was performed.  Dose reduction techniques were used. Dose information is transmitted to  the ACR (American College of Radiology) NRDR (National Radiology Data Registry) which includes the Dose Index Registry.  PATIENT STATED HISTORY:(As transcribed by Technologist)  Fever , fatique with elevated liver enzymes   CONTRAST USED:  100cc of Isovue 370  FINDINGS:  LIVER:  Mild surface nodularity of the liver.  Engorgement of the intrahepatic IVC and hepatic veins with somewhat heterogeneous hepatic perfusion indicating mild passive hepatic congestion. BILIARY:  Mild diffuse gallbladder wall edema, likely related to elevated right heart pressure.  No cholelithiasis or evidence of biliary obstruction. PANCREAS:  Moderate, diffuse parenchymal atrophy. SPLEEN:  No enlargement or focal lesion.  KIDNEYS:  Chronic cortical scarring of the lower pole right kidney.  Geographic areas of cortical hypoattenuation noted throughout the left kidney on arterial and portal venous phase imaging, representing new findings compared to 11/06/2023.  Given severe ostial stenosis of the left renal artery, multifocal renal cortical infarcts are suspected, less likely acute pyelonephritis given absence of significant perinephric stranding.  No collecting system dilation. ADRENALS:  Normal right adrenal gland.  Stable left adrenal myelolipoma. 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  No renal vein thrombus identified. RETROPERITONEUM:  No mass or adenopathy.  BOWEL/MESENTERY:  No evidence of bowel inflammation or obstruction. ABDOMINAL WALL:  Mild diffuse subcutaneous edema. URINARY BLADDER:  Mild concentric thickening and hyperemia of the urinary bladder, similar prior imaging.  This likely represents some component of chronic bladder outlet obstruction in the setting of median lobe enlargement of the prostate gland, though  cystitis may be contributory.  PELVIC NODES:  No  adenopathy.  PELVIC ORGANS:  No visible mass.  Pelvic organs appropriate for patient age.  BONES:  No bony lesion or fracture.  Osteoarthritis of the hips and spine. LUNG BASES:  Mild right and small left pleural effusions with adjacent areas of passive atelectasis. OTHER:  Small volume of free fluid noted within the abdomen and pelvis.  No free intraperitoneal air.            CONCLUSION:   1. Geographic areas of cortical hypoattenuation throughout the left kidney, representing new findings compared to 11/06/2023.  Given advanced calcified atherosclerosis of the abdominal aorta and major arterial branches, findings may reflect multifocal renal cortical infarcts.  Differential includes multifocal acute pyelonephritis.  Correlate with clinical evidence of active infection.  No organized intrarenal or perinephric collections.  2. Engorgement of intrahepatic IVC and hepatic veins with somewhat heterogeneous hepatic perfusion indicating passive hepatic congestion with associated periportal edema.  3. Mild right and small left pleural effusions.   Findings discussed with Dr. Galvin at 2:40 p.m. Central standard time on 02/26/2024.   LOCATION:  [facility]   Dictated by (CST): Eddie Hernandez MD on 2/26/2024 at 2:29 PM     Finalized by (CST): Eddie Hernandez MD on 2/26/2024 at 2:43 PM       XR CHEST AP PORTABLE  (CPT=71045)    Result Date: 2/26/2024  PROCEDURE:  XR CHEST AP PORTABLE  (CPT=71045)  TECHNIQUE:  AP chest radiograph was obtained.  COMPARISON:  [facility] , XR, XR CHEST AP PORTABLE  (CPT=71045), 11/06/2023, 5:35 PM.  INDICATIONS:  fatigue, fever x 2 days, appears pale  PATIENT STATED HISTORY: (As transcribed by Technologist)  Patient's family shares patient has had the chills for 4-5 days with a slight fever. Patient had pacemaker surgery done a few years ago.    FINDINGS:   Do lead left-sided pacemaker.  Cardiac silhouette is minimally prominent.  Pulmonary vasculature is unchanged.  Small left basilar consolidation.             CONCLUSION:  Small left basilar consolidation may represent a developing pneumonia.   LOCATION:  [facility]      Dictated by (CST): Rich Zurita MD on 2/26/2024 at 1:08 PM     Finalized by (CST): Rich Zurita MD on 2/26/2024 at 1:09 PM          Total critical care time: Approximately 65 minutes  Due to a high probability of clinically significant, life threatening deterioration, the patient required my highest level of preparedness to intervene emergently and I personally spent this critical care time directly and personally managing the patient. This critical care time included obtaining a history; examining the patient; pulse oximetry; ordering and review of studies; arranging urgent treatment with development of a management plan; evaluation of patient's response to treatment; frequent reassessment; and, discussions with other providers.  This critical care time was performed to assess and manage the high probability of imminent, life-threatening deterioration that could result in multi-organ failure. It was exclusive of separately billable procedures and treating other patients and teaching time.       [facility]      Sepsis Reassessment Note    /81 (BP Location: Left arm)   Pulse 75   Temp 97.8 °F (36.6 °C) (Oral)   Resp (!) 32   Ht 177.8 cm (5' 10\")   Wt 76.2 kg   SpO2 95%   BMI 24.11 kg/m²      I completed the sepsis reassessment at 1700    Cardiac:  Regularity: Regular  Rate: Normal  Heart Sounds: S1,S2    Lungs:   Right: Clear  Left: Clear    Peripheral Pulses:  Radial: Right 1+ or Left 1+      Capillary Refill:  <3 Secs    Skin:  Temp/Moisture: Warm and Dry  Color: Normal      Mello Galvin MD  2/26/2024  11:29 PM            Medical Decision Making  This is an 88-year-old gentleman here for evaluation of generalized weakness, fevers at home.  Differential includes infection such as pneumonia or urinary tract infection, also considered  cholecystitis choledocholithiasis, as patient tender in the right upper quadrant on exam.  History is limited secondary to patient's clinical condition most of the history is provided by patient's son who is at bedside.  Urinalysis does appear consistent with infection, CT abdomen pelvis was performed shows no acute hepatic or biliary abnormalities aside from possible hepatic congestion per discussion with radiologist.  Does demonstrate changes likely consistent with pyelonephritis.  Patient was covered with broad-spectrum antibiotics, will be admitted for further monitoring evaluation, did respond well to IV fluids, blood pressure 120 systolic patient resting comfortably without complaint.  Case endorsed to the [facility] hospitalist who agrees with plan for admission.    Problems Addressed:  Dehydration: acute illness or injury  Elevated liver enzymes: acute illness or injury  Fever, unspecified fever cause: acute illness or injury  Lactic acid acidosis: acute illness or injury  Urinary tract infection without hematuria, site unspecified: acute illness or injury    Amount and/or Complexity of Data Reviewed  Independent Historian:      Details: Son at bedside  Labs: ordered. Decision-making details documented in ED Course.  Radiology: ordered and independent interpretation performed. Decision-making details documented in ED Course.     Details: I independently viewed and interpreted the following imaging: CT abdomen pelvis without evidence of bowel obstruction  ECG/medicine tests: ordered and independent interpretation performed. Decision-making details documented in ED Course.    Risk  Prescription drug management.  Parenteral controlled substances.  Decision regarding hospitalization.        Disposition and Plan     Clinical Impression:  1. Urinary tract infection without hematuria, site unspecified    2. Dehydration    3. Elevated liver enzymes    4. Fever, unspecified fever cause    5. Lactic acid acidosis          Disposition:  There is no disposition on file for this visit.  There is no disposition time on file for this visit.    Follow-up:  No follow-up provider specified.        Medications Prescribed:  Current Discharge Medication List

## 2024-02-27 NOTE — SLP NOTE
ADULT SWALLOWING EVALUATION    ASSESSMENT    ASSESSMENT/OVERALL IMPRESSION:  Patient is an 87 y/o male admitted with fever and SOB. PMHx significant for afib, PPM, HTN, CVA, DM-2, and MALCOLM. SLP order received to evaluate oropharyngeal swallow d/t failed RN dysphagia screen. Patient received alert in bed with son present at bedside who helped with translation. Patient denied history of dysphagia symptoms and reported consuming a regular diet and thin liquids at baseline.    Patient presented with an intact oropharyngeal swallow. Bolus acceptance was adequate without evidence of anterior bolus loss. Mastication and AP bolus transit were thorough and efficient without evidence of oral residue. Pharyngeal swallow initiation appeared timely and hyolaryngeal excursion was adequate per palpation.  No overt s/s of aspiration observed and patient denied odynophagia and globus sensation across consistencies.     Recommend patient initiate a regular diet and thin liquids with general safe swallowing precautions. No further SLP services warranted at this time as no deficits identified which require skilled intervention. Education provided re: results and recommendations.         RECOMMENDATIONS   Diet Recommendations - Solids: Regular  Diet Recommendations - Liquids: Thin Liquids                              Medication Administration Recommendations: No restrictions  Treatment Plan/Recommendations: No further inpatient SLP service warranted    HISTORY   MEDICAL HISTORY  Reason for Referral: RN dysphagia screen    Problem List  Principal Problem:    Urinary tract infection without hematuria, site unspecified  Active Problems:    Dehydration    Elevated liver enzymes    Fever, unspecified fever cause    Lactic acid acidosis    Pyelonephritis    Elevated INR    Longstanding persistent atrial fibrillation (HCC)    Right upper quadrant abdominal pain    Elevated LFTs    Chronic heart failure with preserved ejection fraction  ([facility])      Past Medical History  Past Medical History:   Diagnosis Date    Arrhythmia     BRADYCARDIA VS AFIB    Disorder of thyroid     Endocrine disorder     hypothyroid    Fracture of left calcaneus 6/29/2016    Resolved last addressed  7/25/16    Hearing impairment     High blood pressure     Osteoarthrosis, unspecified whether generalized or localized, unspecified site     Stroke ([facility]) July 26, 2014    TIA    Thyroid disease     Type II or unspecified type diabetes mellitus without mention of complication, not stated as uncontrolled     Unspecified essential hypertension     Unspecified sleep apnea     Visual impairment     glasses          Diet Prior to Admission: Regular;Thin liquids       Patient/Family Goals: none stated    SWALLOWING HISTORY  Current Diet Consistency: NPO  Dysphagia History: as above  Imaging Results:   CXR 2/26/24  CONCLUSION:  Small left basilar consolidation may represent a developing pneumonia.         LOCATION:  [facility]                  Dictated by (CST): Rich Zurita MD on 2/26/2024 at 1:08 PM       Finalized by (CST): Rich Zurita MD on 2/26/2024 at 1:09 PM     SUBJECTIVE       OBJECTIVE   ORAL MOTOR EXAMINATION  Dentition: Upper dentures  Symmetry: Within Functional Limits  Strength: Within Functional Limits     Range of Motion: Within Functional Limits       Voice Quality: Clear  Respiratory Status: Nasal cannula  Consistencies Trialed: Thin liquids;Puree;Hard solid  Method of Presentation: Staff/Clinician assistance  Patient Positioning: Midline;Upright    Oral Phase of Swallow: Within Functional Limits                      Pharyngeal Phase of Swallow: Within Functional Limits           (Please note: Silent aspiration cannot be evaluated clinically. Videofluoroscopic Swallow Study is required to rule-out silent aspiration.)    Esophageal Phase of Swallow: No complaints consistent with possible esophageal involvement  Comments: d/w RN                  FOLLOW  UP  Treatment Plan/Recommendations: No further inpatient SLP service warranted     Follow Up Needed (Documentation Required): No       Thank you for your referral.   If you have any questions, please contact JACOB Adames

## 2024-02-27 NOTE — PROGRESS NOTES
Pulmonary/Critical Care Physician Addendum     Mahsa Zhang was interviewed and examined personally on 2/27/24.   I reviewed and agree with the APN's documentation above of the patient's history, physical examination, test results, diagnoses, and plan of treatment.      Labs and imaging reviewed.     ASSESSMENT/PLAN  Admitted for sepsis from UTI.   On broad spectrum abx. F/u on cultures.   Off vasopressors.   Lactate improving.   JARVIS still present. Recheck labs this afternoon. In the meantime, continue IVF.   Check RUQ for elevated LFTS.   Hold xarelto since INR is supra therapeutic.   Grandson updated at bedside.     Thank you for the opportunity to care for Mahsa Zhang.      DIANA Staton DO, MPH  Pulmonary Critical Care Medicine  [facility]

## 2024-02-27 NOTE — PLAN OF CARE
Assumed pt care after RN transfer report. Pt Aox4. Denies pain. NC. Rigors- subsided with demerol+tylenol IV. External cath in place. 1 BM. V-paced. BP stable. Pt and son updated on plan of care.

## 2024-02-27 NOTE — CONSULTS
ICU  Critical Care APRN Consult Note    NAME: Mahsa Zhang - ROOM: 455/455-A - MRN: [id number] - Age: 88 year old - :1935    History Of Present Illness:  Mahsa Zhang is a 88 year old male with PMHx significant for Bradycardia, Afib, PPM, HTN, CVA, DM2, HL, MALCOLM who presented to ED with fever and SOB.  He was treated for sepsis with IVF bolus, cultures and antibiotics and admitted to the floor.  However, he transferred to ICU due to increasing lactic acid, transient hypotension and rigors.  On admit to ICU he is c/o feeling cold with significant rigors and has RUQ abdominal pain with palpation.      Past Medical History:  Past Medical History:   Diagnosis Date    Arrhythmia     BRADYCARDIA VS AFIB    Disorder of thyroid     Endocrine disorder     hypothyroid    Fracture of left calcaneus 2016    Resolved last addressed  16    Hearing impairment     High blood pressure     Osteoarthrosis, unspecified whether generalized or localized, unspecified site     Stroke (HCC) 2014    TIA    Thyroid disease     Type II or unspecified type diabetes mellitus without mention of complication, not stated as uncontrolled     Unspecified essential hypertension     Unspecified sleep apnea     Visual impairment     glasses     Past Surgical History:   Past Surgical History:   Procedure Laterality Date    CARDIAC PACEMAKER PLACEMENT      REMV CATARACT EXTRACAP,INSERT LENS Right 2015    Procedure: RIGHT PHACOEMULSIFICATION OF CATARACT WITH INTRAOCULAR LENS IMPLANT 27455;  Surgeon: Louis Jason MD;  Location: [facility]    REMV CATARACT EXTRACAP,INSERT LENS Left 2015    Procedure: LEFT PHACOEMULSIFICATION OF CATARACT WITH INTRAOCULAR LENS IMPLANT [id number];  Surgeon: Louis Jason MD;  Location: [facility]      Family History:   Family History   Problem Relation Age of Onset    Cancer Brother      Social History:    reports that he quit smoking about 39 years ago. He has  quit using smokeless tobacco. He reports that he does not drink alcohol and does not use drugs.     Review of Systems:   A comprehensive 10 point review of systems was completed.  Pertinent positives and negatives noted in the HPI.    Current Facility-Administered Medications   Medication Dose Route Frequency    vancomycin (Vancocin) 1.25 g in sodium chloride 0.9% 250mL IVPB premix  15 mg/kg Intravenous Q24H    acetaminophen (Ofirmev) 10 mg/mL infusion premix 1,000 mg  1,000 mg Intravenous Q6H PRN    acetaminophen (Tylenol) tab 650 mg  650 mg Oral Q4H PRN    Or    acetaminophen (Tylenol) 160 MG/5ML oral liquid 650 mg  650 mg Oral Q4H PRN    Or    acetaminophen (Tylenol) rectal suppository 650 mg  650 mg Rectal Q4H PRN    glucose (Dex4) 15 GM/59ML oral liquid 15 g  15 g Oral Q15 Min PRN    Or    glucose (Glutose) 40% oral gel 15 g  15 g Oral Q15 Min PRN    Or    glucose-vitamin C (Dex-4) chewable tab 4 tablet  4 tablet Oral Q15 Min PRN    Or    dextrose 50% injection 50 mL  50 mL Intravenous Q15 Min PRN    Or    glucose (Dex4) 15 GM/59ML oral liquid 30 g  30 g Oral Q15 Min PRN    Or    glucose (Glutose) 40% oral gel 30 g  30 g Oral Q15 Min PRN    Or    glucose-vitamin C (Dex-4) chewable tab 8 tablet  8 tablet Oral Q15 Min PRN    insulin aspart (NovoLOG) 100 Units/mL FlexPen 1-5 Units  1-5 Units Subcutaneous TID AC and HS    polyethylene glycol (PEG 3350) (Miralax) 17 g oral packet 17 g  17 g Oral Daily PRN    sennosides (Senokot) tab 17.2 mg  17.2 mg Oral Nightly PRN    bisacodyl (Dulcolax) 10 MG rectal suppository 10 mg  10 mg Rectal Daily PRN    fleet enema (Fleet) 7-19 GM/118ML rectal enema 133 mL  1 enema Rectal Once PRN    ondansetron (Zofran) 4 MG/2ML injection 4 mg  4 mg Intravenous Q6H PRN    metoclopramide (Reglan) 5 mg/mL injection 10 mg  10 mg Intravenous Q8H PRN    atorvastatin (Lipitor) tab 40 mg  40 mg Oral Nightly    dilTIAZem ER (CardIZEM CD) 24 hr cap 180 mg  180 mg Oral Daily    levothyroxine  (Synthroid) tab 25 mcg  25 mcg Oral Before breakfast    [Held by provider] rivaroxaban (Xarelto) tab 15 mg  15 mg Oral Daily with food    piperacillin-tazobactam (Zosyn) 3.375 g in dextrose 5% 100 mL IVPB-ADDV  3.375 g Intravenous Q8H    sodium chloride 0.9% infusion   Intravenous Continuous     OBJECTIVE  Vitals:  BP (!) 155/102 (BP Location: Left arm)   Pulse 74   Temp 99 °F (37.2 °C) (Temporal)   Resp (!) 30   Ht 177.8 cm (5' 10\")   Wt 157 lb 10.1 oz (71.5 kg)   SpO2 (!) 89%   BMI 22.62 kg/m²             Physical Exam:    General Appearance: Alert, cooperative, rigors, c/o feeling cold, appears stated age  Neck: No JVD, neck supple, no adenopathy, trachea midline, no carotid bruits  Lungs: Clear to auscultation bilaterally, respirations unlabored  Heart: Regular rate and rhythm, S1 and S2 normal, no murmur, rub or gallop  Abdomen: Soft,tender RUQ with palpation, bowel sounds active all four quadrants, no masses, no organomegaly  Extremities: Atraumatic, no cyanosis or edema, capillary refill <3 sec.    Pulses: 2+ and symmetric all extremities  Skin: Skin color, texture, turgor normal for ethnicity, no rashes or lesions, warm and dry  Neurologic: CNII-XII intact, normal strength    Data this admission:  CT ABDOMEN+PELVIS(CONTRAST ONLY)(CPT=74177)    Result Date: 2/26/2024  CONCLUSION:   1. Geographic areas of cortical hypoattenuation throughout the left kidney, representing new findings compared to 11/06/2023.  Given advanced calcified atherosclerosis of the abdominal aorta and major arterial branches, findings may reflect multifocal renal cortical infarcts.  Differential includes multifocal acute pyelonephritis.  Correlate with clinical evidence of active infection.  No organized intrarenal or perinephric collections.  2. Engorgement of intrahepatic IVC and hepatic veins with somewhat heterogeneous hepatic perfusion indicating passive hepatic congestion with associated periportal edema.  3. Mild right and  small left pleural effusions.   Findings discussed with Dr. Galvin at 2:40 p.m. Central standard time on 02/26/2024.   LOCATION:  [facility]   Dictated by (CST): Eddie Hernandez MD on 2/26/2024 at 2:29 PM     Finalized by (CST): Eddie Hernandez MD on 2/26/2024 at 2:43 PM       XR CHEST AP PORTABLE  (CPT=71045)    Result Date: 2/26/2024  CONCLUSION:  Small left basilar consolidation may represent a developing pneumonia.   LOCATION:  [facility]      Dictated by (CST): Rich Zurita MD on 2/26/2024 at 1:08 PM     Finalized by (CST): Rich Zurita MD on 2/26/2024 at 1:09 PM       Labs:  Lab Results   Component Value Date    WBC 10.2 02/26/2024    HGB 10.2 02/26/2024    HCT 30.2 02/26/2024    .0 02/26/2024    CREATSERUM 1.29 02/26/2024    BUN 29 02/26/2024     02/26/2024    K 3.3 02/26/2024     02/26/2024    CO2 22.0 02/26/2024     02/26/2024    CA 8.5 02/26/2024    ALB 2.4 02/26/2024    ALKPHO 241 02/26/2024    BILT 1.0 02/26/2024    TP 6.5 02/26/2024     02/26/2024     02/26/2024     Assessment/Plan:  Sepsis due to pyelonephritis and PNA  -Trend LA  -IVF bolus - only given 500 NS bolus due to HFpEF  -Zosyn and Vancomycin  -Cultures pending    Rigors/fever  -Demerol x1  -Tylenol PRN  -Antibiotics as above    Abdominal pain RUQ  -Elevated LFTs  -Abdominal US  -CT with hepatic congestion    4. Elevated INR  -Hold Xarelto  -Vit K  -no signs of bleeding  -INR daily    5. HFpEF, HTN, HL, AFib  -Cardizem, Lipitor  -Hold Xarelto due to elevated INR    6. DM2  -Accu-check with ISS    F/E/N:    -IV fluids  -Follow lytes and replete prn  -NPO - speech eval needed    Proph:  -Xarelto held due to elevated INR  -SCD    Dispo:     Code Status: DNAR/Selective Treatment  -ICU for close monitoring    Plan of care discussed with intensivist on-call, Dr. Jonas.      Mable RUTHERFORD  Critical Care Nurse Practitioner  Spec [id number]    A total of 35 minutes of critical care time (exclusive of billable  procedures) was administered. This involved direct patient intervention, complex decision making, and/or extensive discussions (>50% face to face time) with the patient, family, and clinical staff.    The 21st Century Cures Act makes medical notes like these available to patients in the interest of transparency. Please be advised this is a medical document. Medical documents are intended to carry relevant information, facts as evident, and the clinical opinion of the practitioner. The medical note is intended as peer to peer communication and may appear blunt or direct. It is written in medical language and may contain abbreviations or verbiage that are unfamiliar.

## 2024-02-27 NOTE — CM/SW NOTE
02/27/24 1100   CM/SW Referral Data   Referral Source Physician   Reason for Referral Discharge planning   Informant EMR;Son   Medical Hx   Does patient have an established PCP? Yes  (Rey Martinez Brian, MD)   Significant Past Medical/Mental Health Hx arrhythmia, hypothyroid, HTN, OA, TIA, CVA, DMII   Patient Info   Advanced directives? Yes  (POLST)   Patient's Current Mental Status at Time of Assessment Alert   Patient Communication Issues Hearing impairment;Language barrier  (Pt [place] and Jimmy speaking)   Patient lives with Son  (Per son Jasen, pt lives with both he and [place])   Patient Status Prior to Admission   Independent with ADLs and Mobility No   Pt. requires assistance with Driving;Meals;Housework;Finances   Services in place prior to admission Home Health Care   Home Health Provider Info Unkown at this time   Discharge Needs   Anticipated D/C needs To be determined     Met w/ pt and son Jasen to assess pt's home environment and any anticipated DC needs. Pt's son Jasen reports they do have home health services but he is unsure of the agency. Jasen asks that CM/SW follow up again tomorrow, when his brother Cleveland is available. Cleveland primarily handles pt's health care and home care needs. Will endorse to covering CM for follow up 2/28.     CM/SW will remain available for DC planning and/or support.     PRERNA Arora, CMSRN    [id number]

## 2024-02-28 NOTE — OCCUPATIONAL THERAPY NOTE
OCCUPATIONAL THERAPY EVALUATION - INPATIENT     Room Number: 455/455-A  Evaluation Date: 2/28/2024  Type of Evaluation: Initial  Presenting Problem: UTI, sepsis, dehydration, fever    Physician Order: IP Consult to Occupational Therapy  Reason for Therapy: ADL/IADL Dysfunction and Discharge Planning    OCCUPATIONAL THERAPY ASSESSMENT   Patient is currently functioning near baseline with toileting, bathing, upper body dressing, lower body dressing, bed mobility, and transfers. Prior to admission, patient's baseline is mod I with a RW. Patient is requiring minimal assist and moderate assist as a result of the following impairments: decreased functional strength, decreased functional reach, decreased endurance, decreased muscular endurance, and medical status. Occupational Therapy will continue to follow for duration of hospitalization.    Patient will benefit from continued skilled OT Services at discharge to promote functional independence and safety with additional support and return home with home health OT      History Related to Current Admission: Patient is a 88 year old male admitted on 2/26/2024 with Presenting Problem: UTI, sepsis, dehydration, fever. Co-Morbidities : h/o HTN, TIA, DM2    Recommendations for nursing staff:   Transfers: min A with RW  Toileting location: bedside commode versus toilet     EVALUATION SESSION  Patient Start of Session: Pt was found in his bed with his son at the bedside.   FUNCTIONAL TRANSFER ASSESSMENT  Sit to Stand: Edge of Bed  Edge of Bed: Minimal Assist    BED MOBILITY  Supine to Sit : Minimal Assist    BALANCE ASSESSMENT     FUNCTIONAL ADL ASSESSMENT  LB Dressing Seated: Maximum Assist      ACTIVITY TOLERANCE:                          O2 SATURATIONS       Cognition  WFL    Upper extremity  WFL    EDUCATION PROVIDED  Patient: Role of Occupational Therapy; Plan of Care; Discharge Recommendations; Functional Transfer Techniques; Fall Prevention  Patient's Response to  Education: Verbalized Understanding  Family/Caregiver: Role of Occupational Therapy; Plan of Care; Discharge Recommendations; Functional Transfer Techniques; Fall Prevention  Family/Caregiver's Response to Education: Verbalized Understanding      Equipment used: RW, gait belt   Demonstrates functional use, Would benefit from additional trial     Therapist comments: supine>sit>stand with RW>steps to sit in the chair     Patient End of Session: Up in chair;Needs met;Call light within reach;RN aware of session/findings;All patient questions and concerns addressed;Alarm set;Family present    OCCUPATIONAL PROFILE    HOME SITUATION  Type of Home: House  Home Layout: Two level;Able to live on main level  Lives With: Son;Family          Other Equipment: Other (Comment) (RW)    Occupation/Status: Retired     Drives: No       Prior Level of Function: Pt lives at home with his spouse and children who provide  care. Pt is typically mod I with BADL using a RW.     SUBJECTIVE   \"It would help us out at home if he could have help taking a shower.\" Per pt's son     PAIN ASSESSMENT  Ratin  Location:  (Pt denies pain)       OBJECTIVE  Precautions: Bed/chair alarm; needed  Fall Risk: High fall risk    WEIGHT BEARING RESTRICTION  Weight Bearing Restriction: None                ASSESSMENTS    AM-PAC ‘6-Clicks’ Inpatient Daily Activity Short Form  -   Putting on and taking off regular lower body clothing?: A Lot  -   Bathing (including washing, rinsing, drying)?: A Lot  -   Toileting, which includes using toilet, bedpan or urinal? : A Lot  -   Putting on and taking off regular upper body clothing?: A Little  -   Taking care of personal grooming such as brushing teeth?: A Little  -   Eating meals?: None    AM-PAC Score:  Score: 16  Approx Degree of Impairment: 53.32%  Standardized Score (AM-PAC Scale): 35.96    PLAN  OT Treatment Plan: Balance activities;Energy conservation/work simplification techniques;ADL  training;Functional transfer training;Endurance training;Patient/Family education;Patient/Family training;Equipment eval/education;Compensatory technique education;Continued evaluation  Rehab Potential : Fair  Frequency: 3x/week  Number of Visits to Meet Established Goals: 3      ADL Goals  Patient will perform toileting with supervision and AE PRN  Patient will perform LB dressing with supervision and AE PRN    Functional Transfer Goals  Patient will perform bed mobility supine to sit with supervision  Patient will perform bed mobility sit to supine with supervision  Patient will perform toilet transfer with supervision    Additional Goals:  Patient will state precautions and maintain during ADL      Patient Evaluation Complexity Level:   Occupational Profile/Medical History HIGH - Extensive review of history including review of medical or therapy records    Specific performance deficits impacting engagement in ADL/IADL HIGH  5+ performance deficits    Client Assessment/Performance Deficits HIGH - Comorbidities and significant modifications of tasks    Clinical Decision Making HIGH - Analysis of occupational profile, comprehensive assessments, multiple treatment options    Overall Complexity HIGH     OT Session Time: 30 minutes  Therapeutic Activity: 15 minutes

## 2024-02-28 NOTE — H&P
[facility] Hospitalist Progress Note     CC: Hospital Follow up    PCP: Agus Martinez MD       Assessment/Plan:     Principal Problem:    Urinary tract infection without hematuria, site unspecified  Active Problems:    Dehydration    Elevated liver enzymes    Fever, unspecified fever cause    Lactic acid acidosis    Pyelonephritis    Elevated INR    Longstanding persistent atrial fibrillation (HCC)    Right upper quadrant abdominal pain    Elevated LFTs    Chronic heart failure with preserved ejection fraction (HCC)        Patient is a 88 year old male with PMH sig for hypothyroidism, type 2 diabetes, bradycardia status post pacemaker, A-fib on Xarelto who presents for evaluation of fever chills and generalized weakness from home.       Septic shock  Elevated lactate  Likely secondary to acute pyelonephritis, right  - Status post 2 L bolus in the ER, proBNP elevated, will hold further fluids  - Lactate elevated, repeat now clear  - Broad-spectrum antibiotics, IV Zosyn, add IV vancomycin  - CT abdomen pelvis suggestive of acute pyelonephritis, UA grossly abnormal  - Blood cultures x 2 pending, urine cultures pending  - continue icu management, recs reviewed     Hypothyroidism  - Continue levothyroxine if able to take p.o.     Type 2 diabetes with hyperglycemia  - ISS, Accu-Cheks     Arrhythmia  Bradycardia status post pacemaker  A-fib  - Hold Xarelto given elevated INR, s/p vitamin K  - Diltiazem and metoprolol to continue with parameters     Suspect acute on chronic diastolic heart failure  - proBNP elevated  - Will hold diuretics at this time  - 2D echo  - Telemetry     Elevated liver enzymes  - Possibly from shock?  - Ultrasound liver     Advance care planning  - POA: Son  - CODE STATUS: DNR/select-discussed with patient and his family at bedside and on presentation        FN:  - IVF: hold  - Diet: N.p.o. until swallow evaluation     DVT Prophy: SCDs  Atrophy: PT/OT  Lines: PIV     Dispo:  ICU  Outpatient records or  previous hospital records reviewed.      Further recommendations pending patient's clinical course.  [facility] hospitalist to continue to follow patient while in house     Patient and/or patient's family given opportunity to ask questions and note understanding and agreeing with therapeutic plan as outlined        Thank You,  DO William [facility] Hospitalist  Answering Service number: [phone]     Subjective:      No CP, SOB, or N/V. Feels better today. Son at bedside. Still having episodes of chills    OBJECTIVE:    Blood pressure 105/62, pulse 100, temperature 98.4 °F (36.9 °C), temperature source Temporal, resp. rate 19, height 5' 10\" (1.778 m), weight 157 lb 10.1 oz (71.5 kg), SpO2 97%.    Temp:  [97.8 °F (36.6 °C)-99.5 °F (37.5 °C)] 98.4 °F (36.9 °C)  Pulse:  [] 100  Resp:  [18-31] 19  BP: ()/() 105/62  SpO2:  [90 %-97 %] 97 %      Intake/Output:    Intake/Output Summary (Last 24 hours) at 2/28/2024 0043  Last data filed at 2/27/2024 2336  Gross per 24 hour   Intake 1153 ml   Output 675 ml   Net 478 ml       Last 3 Weights   02/27/24 0313 157 lb 10.1 oz (71.5 kg)   02/26/24 2043 168 lb (76.2 kg)   02/26/24 1151 160 lb (72.6 kg)   11/06/23 2257 144 lb 3.2 oz (65.4 kg)   11/06/23 1645 160 lb (72.6 kg)   04/30/23 0537 179 lb 7.3 oz (81.4 kg)   04/29/23 0640 182 lb 12.2 oz (82.9 kg)   04/28/23 0810 161 lb 9.6 oz (73.3 kg)   04/26/23 0805 180 lb 12.4 oz (82 kg)       Exam   Gen: No acute distress, alert and oriented x3, no focal neurologic deficits, ill appearing  Heent: NC AT, mucous memb clear, , neck supple  Pulm: Lungs clear, normal respiratory effort  CV: Heart with regular rate and rhythm, no peripheral edema  Abd: Abdomen soft, nontender, nondistended, no organomegaly, bowel sounds present  MSK: moving all extremities  Skin: no rashes or lesions, some bruises noted from blood draws  Neuro: AO2-3, motor intact, no sensory deficits  Psyc: appropriate mood and affect      Data Review:        Labs:     Recent Labs   Lab 02/26/24 1200 02/27/24 0430   RBC 4.25 4.74   HGB 10.2* 11.0*   HCT 30.2* 35.0*   MCV 71.1* 73.8*   MCH 24.0* 23.2*   MCHC 33.8 31.4   RDW 16.9 17.1   NEPRELIM 9.71* 8.39*   WBC 10.2 9.8   .0 247.0         Recent Labs   Lab 02/26/24 1200 02/27/24  0430 02/27/24 2046   * 157* 162*   BUN 29* 31* 30*   CREATSERUM 1.29 1.43* 1.26   EGFRCR 54* 48* 56*   CA 8.5 8.5 7.9*   * 129* 131*   K 3.3* 4.4 3.1*    103 102   CO2 22.0 16.0* 21.0       Recent Labs   Lab 02/26/24 1200 02/27/24 0430 02/27/24 2046   * 283* 257*   * 558* 358*   ALB 2.4* 2.5* 1.8*         Imaging:  XR CHEST AP PORTABLE  (CPT=71045)    Result Date: 2/27/2024  CONCLUSION:  1. When compared to 2/26/2024 chest radiograph, increasing bilateral airspace opacities may represent edematous or infectious process, correlate clinically.   LOCATION:  [facility]      Dictated by (CST): Kelly Rob MD on 2/27/2024 at 2:17 PM     Finalized by (CST): Kelly Rob MD on 2/27/2024 at 2:19 PM       US ABDOMEN COMPLETE (CPT=76700)    Result Date: 2/27/2024  CONCLUSION:  1. There is a small amount of ascites around liver and spleen. 2. There is right pleural effusion. 3. Mild gallbladder wall thickening is noted.  There are no shadowing stones and sonographic [place] sign was negative.  This is a nonspecific finding in the setting of ascites.   LOCATION:  [facility]    Dictated by (CST): Agus Ferguson MD on 2/27/2024 at 11:27 AM     Finalized by (CST): Agus Ferguson MD on 2/27/2024 at 11:29 AM       CT ABDOMEN+PELVIS(CONTRAST ONLY)(CPT=74177)    Result Date: 2/26/2024  CONCLUSION:   1. Geographic areas of cortical hypoattenuation throughout the left kidney, representing new findings compared to 11/06/2023.  Given advanced calcified atherosclerosis of the abdominal aorta and major arterial branches, findings may reflect multifocal renal cortical infarcts.  Differential includes multifocal acute pyelonephritis.   Correlate with clinical evidence of active infection.  No organized intrarenal or perinephric collections.  2. Engorgement of intrahepatic IVC and hepatic veins with somewhat heterogeneous hepatic perfusion indicating passive hepatic congestion with associated periportal edema.  3. Mild right and small left pleural effusions.   Findings discussed with Dr. Galvin at 2:40 p.m. Central standard time on 02/26/2024.   LOCATION:  [facility]   Dictated by (CST): Eddie Hernandez MD on 2/26/2024 at 2:29 PM     Finalized by (CST): Eddie Hernandez MD on 2/26/2024 at 2:43 PM       XR CHEST AP PORTABLE  (CPT=71045)    Result Date: 2/26/2024  CONCLUSION:  Small left basilar consolidation may represent a developing pneumonia.   LOCATION:  [facility]      Dictated by (CST): Rich Zurita MD on 2/26/2024 at 1:08 PM     Finalized by (CST): Rich Zurita MD on 2/26/2024 at 1:09 PM          Meds:      vancomycin  15 mg/kg Intravenous Q24H    furosemide  40 mg Intravenous Once    potassium chloride  40 mEq Oral Q4H    insulin aspart  1-5 Units Subcutaneous TID AC and HS    [Held by provider] atorvastatin  40 mg Oral Nightly    dilTIAZem HCl ER  180 mg Oral Daily    levothyroxine  25 mcg Oral Before breakfast    [Held by provider] rivaroxaban  15 mg Oral Daily with food    piperacillin-tazobactam  3.375 g Intravenous Q8H       acetaminophen, acetaminophen **OR** acetaminophen **OR** acetaminophen, glucose **OR** glucose **OR** glucose-vitamin C **OR** dextrose **OR** glucose **OR** glucose **OR** glucose-vitamin C, polyethylene glycol (PEG 3350), sennosides, bisacodyl, fleet enema, ondansetron, metoclopramide

## 2024-02-28 NOTE — PLAN OF CARE
One time episode of pt getting rigors, no temp. Pt's HR beulah to 170s, SBP went to 190s. MD notified. Orders received at bedside (see eMAR). Pt's VS are now stable. Night shift endorsed on this event

## 2024-02-28 NOTE — PHYSICAL THERAPY NOTE
PHYSICAL THERAPY EVALUATION - INPATIENT     Room Number: 455/455-A  Evaluation Date: 2/28/2024  Type of Evaluation: Initial  Physician Order: PT Eval and Treat    Presenting Problem: UTI  Co-Morbidities : h/o HTN, TIA, DM2  Reason for Therapy: Mobility Dysfunction and Discharge Planning    PHYSICAL THERAPY ASSESSMENT   Patient is currently functioning below baseline with bed mobility, transfers, and gait.  Prior to admission, patient's baseline is supervision with RW.  Patient is requiring contact guard assist and moderate assist as a result of the following impairments: decreased functional strength, decreased endurance/aerobic capacity, and medical status.  Physical Therapy will continue to follow for duration of hospitalization.    Patient will benefit from continued skilled PT Services at discharge to promote functional independence and safety with additional support and return home with home health PT.    PLAN  PT Treatment Plan: Bed mobility;Body mechanics;Endurance;Energy conservation;Patient education;Family education;Gait training;Range of motion;Stoop training;Stair training;Transfer training;Balance training  Rehab Potential : Good  Frequency (Obs):  (2-3x/week)  Number of Visits to Meet Established Goals: 3      CURRENT GOALS    Goal #1 Patient is able to demonstrate supine - sit EOB @ level: supervision     Goal #2 Patient is able to demonstrate transfers Sit to/from Stand at assistance level: supervision     Goal #3 Patient is able to ambulate 50 feet with assist device: walker - rolling at assistance level: supervision     Goal #4    Goal #5    Goal #6    Goal Comments: Goals established on 2/28/2024      PHYSICAL THERAPY MEDICAL/SOCIAL HISTORY  History related to current admission: Patient is a 88 year old male admitted on 2/26/2024 from home for UTI.      HOME SITUATION  Type of Home: House   Home Layout: Two level;Able to live on main level                Lives With: Son;Family     Patient  Owned Equipment: Rolling walker       Prior Level of [place]: Pt reports mod ind with RW PTA.  At times does not use assistive device.    SUBJECTIVE  Pt agreeable to PT.  Pt's son assisting with some translation.      OBJECTIVE  Precautions: Bed/chair alarm; needed  Fall Risk: High fall risk    WEIGHT BEARING RESTRICTION                   PAIN ASSESSMENT  Ratin          COGNITION  Overall Cognitive Status:  WFL - within functional limits    RANGE OF MOTION AND STRENGTH ASSESSMENT  Upper extremity ROM and strength are within functional limits     Lower extremity ROM is within functional limits     Lower extremity strength is within functional limits       BALANCE  Static Sitting: Good  Dynamic Sitting: Good  Static Standing: Poor  Dynamic Standing: Poor +    ADDITIONAL TESTS                                    ACTIVITY TOLERANCE                         O2 WALK       NEUROLOGICAL FINDINGS                        AM-PAC '6-Clicks' INPATIENT SHORT FORM - BASIC MOBILITY  How much difficulty does the patient currently have...  Patient Difficulty: Turning over in bed (including adjusting bedclothes, sheets and blankets)?: A Little   Patient Difficulty: Sitting down on and standing up from a chair with arms (e.g., wheelchair, bedside commode, etc.): A Lot   Patient Difficulty: Moving from lying on back to sitting on the side of the bed?: A Lot   How much help from another person does the patient currently need...   Help from Another: Moving to and from a bed to a chair (including a wheelchair)?: A Lot   Help from Another: Need to walk in hospital room?: A Little   Help from Another: Climbing 3-5 steps with a railing?: A Little       AM-PAC Score:  Raw Score: 15   Approx Degree of Impairment: 57.7%   Standardized Score (AM-PAC Scale): 39.45   CMS Modifier (G-Code): CK    FUNCTIONAL ABILITY STATUS  Gait Assessment   Functional Mobility/Gait Assessment  Gait Assistance: Contact guard assist  Distance (ft):  2  Assistive Device: Rolling walker  Pattern: Shuffle    Skilled Therapy Provided     Bed Mobility:  Rolling: ind  Supine to sit: min A   Sit to supine: NT     Transfer Mobility:  Sit to stand: mod A   Stand to sit: min A  Gait = Ambulation bed to chair only.      Therapist's Comments: Pt reports dizziness with sit to stand, BP WFL.  Encouraged OOB to chair as able.  Pt's son present and encouraging eating after session.    Exercise/Education Provided:  Bed mobility  Body mechanics  Functional activity tolerated  Gait training  Transfer training    Patient End of Session: Up in chair;Needs met;Call light within reach;RN aware of session/findings;All patient questions and concerns addressed;Discussed recommendations with /      Patient Evaluation Complexity Level:  History Moderate - 1 or 2 personal factors and/or co-morbidities   Examination of body systems Moderate - addressing a total of 3 or more elements   Clinical Presentation Moderate - Evolving   Clinical Decision Making Moderate - Evolving       PT Session Time: 20 minutes    Therapeutic Activity: 10 minutes

## 2024-02-28 NOTE — PLAN OF CARE
Pt a/o x4. Denies pain. Jimmy speaking. Pt son translating at bedside. Maintaining spo2 saturations on 2L NC. Afib RVR this AM. -160s. Rate improved with cardizem infusion initiation and bolus. Cardizem titrated off. Mild tremors this AM. Improved with controlled HR. Pacer not capturing. Device interrogation at bedside. Cardiology consult. PT/OT this afternoon, well tolerated. Up to chair with 1-2 assist. Poor appetite. PO intake encouraged. Dietary supplement added. Adequate urine output. Loose stools. C.diff (-). Active transfer orders in place. Awaiting bed placement. Plan of care discussed.

## 2024-02-28 NOTE — DIETARY NOTE
[facility]  NUTRITION ASSESSMENT    Pt does not meet malnutrition criteria at this time.    NUTRITION INTERVENTION:    Meal and Snacks - Change to CHO Controlled Vegetarian Diet as tolerated; monitor patient po intake. Encourage adequate po of appropriate diet.  Medical Food Supplements - RD added [employer] 1.0 Standard vanilla daily. Rationale/use for oral supplements discussed.  Vitamin and Mineral Supplements - Recommend adding Multivitamin with minerals    PATIENT STATUS: 88 year old Jimmy speaking male admitted on 2/26 presents with urosepsis. Pt screened d/t MST score 2. Visited pt at bedside with son present who provided hx. He reports pt with decreased appetite/PO intake and associated wt loss since previous admission in October. Reports pt was having stomach aches and on liquid diet so he lost 15-20 lbs but unsure what his UBW was or is now. Per chart review, pt weighs 144 lbs during previous admit in Nov and current wt recorded at 163 lbs. Son reports pt with poor appetite currently and only had apple juice and a little milk of bfast. Denies GI symptoms at this time. SLP evaluated and recommends regular solids and thin liquids. NKFA but pt is vegetarian. Son reports pt drinking [employer] Glucose Support daily at home and agreeable to vanilla [employer] Standard during admit. Pt not appropriate for DM diet education at this time; will consider addressing as pt improves. All questions answered at this time.    PMH: Endocrine disorder, Fracture of left calcaneus (6/29/2016), Hearing impairment, Osteoarthrosis, Stroke, Thyroid disease, Type II DM, essential hypertension, Unspecified sleep apnea    ANTHROPOMETRICS:  Ht: 177.8 cm (5' 10\")  Wt: 74 kg (163 lb 2.3 oz).   BMI: Body mass index is 23.41 kg/m².  IBW: 75.5 kg    WEIGHT HISTORY: Per chart, pt with ~16 lb wt loss x 10 months (9%, not significant per standards).  Patient Weight(s) for the past 336 hrs:   Weight   02/28/24 0500 74 kg (163 lb 2.3 oz)    02/27/24 0313 71.5 kg (157 lb 10.1 oz)   02/26/24 2043 76.2 kg (168 lb)   02/26/24 1151 72.6 kg (160 lb)       Wt Readings from Last 10 Encounters:   02/28/24 74 kg (163 lb 2.3 oz)   11/06/23 65.4 kg (144 lb 3.2 oz)   04/30/23 81.4 kg (179 lb 7.3 oz)   05/20/22 84.8 kg (187 lb)   07/01/21 90.6 kg (199 lb 12.8 oz)   10/05/20 93.6 kg (206 lb 6.4 oz)   08/25/20 88.9 kg (196 lb)   09/19/19 90.5 kg (199 lb 9.6 oz)   08/29/19 92.1 kg (203 lb)   07/02/19 88.9 kg (196 lb)        NUTRITION:  Diet:       Procedures    Regular/General diet Calorie Restriction/Carb Controlled: 1800 kcal/60 grams; Is Patient on Accuchecks? Yes        Percent Meals Eaten (last 3 days)       Date/Time Percent Meals Eaten (%)    02/28/24 1017 20 %            Food Allergies: No  Cultural/Ethnic/Congregation Preferences Addressed: Yes - vegetarian.    GI SYSTEM REVIEW: WNL  Skin/Wounds: WNL    NUTRITION RELATED PHYSICAL FINDINGS:     1. Body Fat/Muscle Mass: mild muscle depletion Temple region and Clavicle region     2. Fluid Accumulation: generalized edema     NUTRITION PRESCRIPTION: 74 kg  Calories: 5253-9022 calories/day (25-30 kcal/kg)  Protein:  grams protein/day (1.0-1.5 gm/kg)  Fluid: ~1 ml/kcal or per MD discretion    NUTRITION DIAGNOSIS/PROBLEM:  Inadequate oral intake related to insufficient appetite resulting in inadequate nutrition intake as evidenced by documented/reported insufficient oral intake and documented/reported unintentional weight loss    MONITOR AND EVALUATE/NUTRITION GOALS:  PO intake of 75% of meals TID - New  PO intake of 75% of oral nutrition supplement/s - New  Weight stable within 1 to 2 lbs during admission - New      MEDICATIONS:  SSI aspart, synthroid, abx    LABS:  , POC glucose x 24hrs: 138-176 mg/dl, A1c = 8.7%    Pt is at Moderate nutrition risk    Karolyn Harrison, RD, LDN, CNSC  Clinical Dietitian  Spectra: [phone]

## 2024-02-28 NOTE — PROGRESS NOTES
Daily Pulmonary/ICU/Critical Care Progress Note          SUBJECTIVE:  Feels better. Off pressors.  Grandson at bedside.       ALLERGIES:  No Known Allergies      MEDS:  Home Medications:  Outpatient Medications Marked as Taking for the 2/26/24 encounter (Hospital Encounter)   Medication Sig Dispense Refill    traMADol 50 MG Oral Tab Take 1 tablet (50 mg total) by mouth daily.      finasteride 5 MG Oral Tab Take 1 tablet (5 mg total) by mouth daily.      metoprolol tartrate 50 MG Oral Tab Take 1 tablet (50 mg total) by mouth 2x Daily(Beta Blocker). 90 tablet 0    rivaroxaban (XARELTO) 15 MG Oral Tab TAKE 1 TABLET BY MOUTH DAILY WITH FOOD 90 tablet 0    Ferrous Sulfate 325 (65 Fe) MG Oral Tab Take 1 tablet (325 mg total) by mouth 2 (two) times daily.      dilTIAZem HCl  MG Oral Capsule SR 24 Hr Take 1 capsule (180 mg total) by mouth daily. 30 capsule 1    levothyroxine 25 MCG Oral Tab Take 1 tablet (25 mcg total) by mouth before breakfast.      tamsulosin (FLOMAX) cap TAKE 1 CAPSULE BY MOUTH DAILY (Patient taking differently: Take 1 capsule (0.4 mg total) by mouth at bedtime.) 90 capsule 3    JANUVIA 25 MG Oral Tab TAKE 1 TABLET(25 MG) BY MOUTH DAILY 90 tablet 3    ATORVASTATIN 40 MG Oral Tab TAKE 1 TABLET BY MOUTH EVERY NIGHT AT BEDTIME 90 tablet 3    METFORMIN HCL 1000 MG Oral Tab TAKE 1 TABLET BY MOUTH TWICE DAILY WITH MEALS 180 tablet 3    aspirin (ASPIRIN EC LOW DOSE) 81 MG Oral Tab EC Take 1 tablet (81 mg total) by mouth once daily. 90 tablet 3     Scheduled Medication:   furosemide  40 mg Intravenous Once    insulin aspart  1-5 Units Subcutaneous TID AC and HS    [Held by provider] atorvastatin  40 mg Oral Nightly    dilTIAZem HCl ER  180 mg Oral Daily    levothyroxine  25 mcg Oral Before breakfast    rivaroxaban  15 mg Oral Daily with food    piperacillin-tazobactam  3.375 g Intravenous Q8H     Continuous Infusing Medication:   dilTIAZem 5 mg/hr (02/28/24 1017)     PRN Medications:  dilTIAZem,  acetaminophen, acetaminophen **OR** acetaminophen **OR** acetaminophen, glucose **OR** glucose **OR** glucose-vitamin C **OR** dextrose **OR** glucose **OR** glucose **OR** glucose-vitamin C, polyethylene glycol (PEG 3350), sennosides, bisacodyl, fleet enema, ondansetron, metoclopramide       PHYSICAL EXAM:  /80   Pulse 106   Temp 98.9 °F (37.2 °C) (Temporal)   Resp 26   Ht 5' 10\" (1.778 m)   Wt 163 lb 2.3 oz (74 kg)   SpO2 94%   BMI 23.41 kg/m²          CONSTITUTIONAL: alert, oriented, no apparent distress  HEENT: atraumatic normocephalic  MOUTH: mucous membranes are moist. No OP exudates  NECK/THROAT: no JVD. Trachea midline. No obvious thyromegaly  LUNG: clear b/l no wheezing, crackles. Chest symmetric with respiratory motion  HEART: regular rate and rhythm, no obvious murmers or gallops noted  ABD: soft non tender. + bowel sounds. No organomegaly noted  EXT: no clubbing, cyanosis, or edema noted. Pulses intact grossly  NEURO/MUSCULOSKELETAL: no gross deficits  SKIN: warm, dry. No obvious lesions noted  LYMPH: no obvious LAD      IMAGES:   Reviewed in EMR      LABS:  Recent Labs   Lab 02/26/24  1200 02/27/24  0430 02/28/24  0657   RBC 4.25 4.74 4.97   HGB 10.2* 11.0* 11.6*   HCT 30.2* 35.0* 35.2*   MCV 71.1* 73.8* 70.8*   MCH 24.0* 23.2* 23.3*   MCHC 33.8 31.4 33.0   RDW 16.9 17.1 17.2   NEPRELIM 9.71* 8.39* 6.18   WBC 10.2 9.8 6.8   .0 247.0 247.0       Recent Labs   Lab 02/27/24  0430 02/27/24  2046 02/28/24  0657   * 162* 130*   BUN 31* 30* 32*   CREATSERUM 1.43* 1.26 1.35*   EGFRCR 48* 56* 51*   CA 8.5 7.9* 8.0*   ALB 2.5* 1.8* 2.0*   * 131* 131*   K 4.4 3.1* 3.7  3.7    102 102   CO2 16.0* 21.0 22.0   ALKPHO 223* 172* 203*   * 358* 412*   * 257* 313*   BILT 1.3 1.0 1.4   TP 6.9 5.5* 6.0*       ASSESSMENT/PLAN:  Sepsis due to pyelonephritis and PNA  -Trend LA-normalized  -IVF bolus PRN  -Stop vanco and continue zosyn   -F/u on cx     Rigors/fever  -Demerol  x1  -Tylenol PRN  -Antibiotics as above     Abdominal pain RUQ  -Elevated LFTs  -Abdominal US with mild GB thickening     4. Elevated INR  -Hold Xarelto  -no signs of bleeding  -INR daily     5. HFpEF, HTN, HL, AFib  -Cardizem, Lipitor  -Hold Xarelto due to elevated INR  -Lasix x 1 yesterday     6. DM2  -Accu-check with ISS     F/E/N:    -IV fluids  -Follow lytes and replete prn  -Diet     Proph:  -Xarelto held due to elevated INR  -SCD     Dispo:   Code Status: DNAR/Selective Treatment  -Ok for transfer out of ICU.     Thank you for the opportunity to care for Mahsa Staton DO, MPH  Pulmonary Critical Care Medicine

## 2024-02-28 NOTE — PROGRESS NOTES
[facility] Hospitalist Progress Note     CC: Hospital Follow up    PCP: Agus Martinez MD       Assessment/Plan:     Principal Problem:    Urinary tract infection without hematuria, site unspecified  Active Problems:    Dehydration    Elevated liver enzymes    Fever, unspecified fever cause    Lactic acid acidosis    Pyelonephritis    Elevated INR    Longstanding persistent atrial fibrillation (HCC)    Right upper quadrant abdominal pain    Elevated LFTs    Chronic heart failure with preserved ejection fraction (HCC)        Patient is a 88 year old male with PMH sig for hypothyroidism, type 2 diabetes, bradycardia status post pacemaker, A-fib on Xarelto who presents for evaluation of fever chills and generalized weakness. Managed for septic shock 2/2 pyelonephritis     Septic shock- resolved  Elevated lactate- resolved  Likely secondary to acute pyelonephritis, right  - Status post 2 L bolus in the ER, proBNP elevated, will hold further fluids  - Lactate elevated, repeat now clear  - Broad-spectrum antibiotics, cont IV Zosyn, dc IV vancomycin  - CT abdomen pelvis suggestive of acute pyelonephritis, UA grossly abnormal  - Blood cultures x 2 NGTD, urine cultures multiple species  - ok to transfer out of icu     Hypothyroidism  - Continue levothyroxine if able to take p.o.     Type 2 diabetes with hyperglycemia  - ISS, Accu-Cheks     Arrhythmia  Bradycardia status post pacemaker  A-fib  - Hold Xarelto given elevated INR, s/p vitamin K  - Diltiazem and metoprolol to continue with parameters     Suspect acute on chronic diastolic heart failure  - proBNP elevated  - Will hold diuretics at this time  - 2D echo- reviewed  - Telemetry     Elevated liver enzymes  - Possibly from shock?  - Ultrasound liver- with small ascites  - trend liver enzymes, if still uptrending, will add GI consult     Advance care planning  - POA: Son  - CODE STATUS: DNR/select-discussed with patient and his family at bedside and on presentation         FN:  - IVF: hold  - Diet: swallow eval     DVT Prophy: SCDs  Atrophy: PT/OT  Lines: PIV     Dispo:  ok to transfer to cardiac tele  Outpatient records or previous hospital records reviewed.      Further recommendations pending patient's clinical course.  [facility] hospitalist to continue to follow patient while in house. Discussed with son at bedside.     Patient and/or patient's family given opportunity to ask questions and note understanding and agreeing with therapeutic plan as outlined        Thank You,  DO William [facility]ino Hospitalist  Answering Service number: [phone]     Subjective:      No CP, SOB, or N/V. Feels better today. Son at bedside. Still having episodes of chills. In better spirit    OBJECTIVE:    Blood pressure 138/80, pulse 106, temperature 98.9 °F (37.2 °C), temperature source Temporal, resp. rate 26, height 5' 10\" (1.778 m), weight 163 lb 2.3 oz (74 kg), SpO2 94%.    Temp:  [97.8 °F (36.6 °C)-99.4 °F (37.4 °C)] 98.9 °F (37.2 °C)  Pulse:  [] 106  Resp:  [18-33] 26  BP: ()/() 138/80  SpO2:  [91 %-97 %] 94 %      Intake/Output:    Intake/Output Summary (Last 24 hours) at 2/28/2024 1442  Last data filed at 2/28/2024 1200  Gross per 24 hour   Intake 1152 ml   Output 1825 ml   Net -673 ml       Last 3 Weights   02/28/24 0500 163 lb 2.3 oz (74 kg)   02/27/24 0313 157 lb 10.1 oz (71.5 kg)   02/26/24 2043 168 lb (76.2 kg)   02/26/24 1151 160 lb (72.6 kg)   11/06/23 2257 144 lb 3.2 oz (65.4 kg)   11/06/23 1645 160 lb (72.6 kg)   04/30/23 0537 179 lb 7.3 oz (81.4 kg)   04/29/23 0640 182 lb 12.2 oz (82.9 kg)   04/28/23 0810 161 lb 9.6 oz (73.3 kg)   04/26/23 0805 180 lb 12.4 oz (82 kg)       Exam   Gen: No acute distress, alert and oriented x3, no focal neurologic deficits, ill appearing  Heent: NC AT, mucous memb clear, , neck supple  Pulm: Lungs clear, normal respiratory effort  CV: Heart with regular rate and rhythm, no peripheral edema  Abd: Abdomen soft, nontender,  nondistended, no organomegaly, bowel sounds present  MSK: moving all extremities  Skin: no rashes or lesions, some bruises noted from blood draws  Neuro: AO2-3, motor intact, no sensory deficits  Psyc: appropriate mood and affect      Data Review:       Labs:     Recent Labs   Lab 02/26/24  1200 02/27/24  0430 02/28/24  0657   RBC 4.25 4.74 4.97   HGB 10.2* 11.0* 11.6*   HCT 30.2* 35.0* 35.2*   MCV 71.1* 73.8* 70.8*   MCH 24.0* 23.2* 23.3*   MCHC 33.8 31.4 33.0   RDW 16.9 17.1 17.2   NEPRELIM 9.71* 8.39* 6.18   WBC 10.2 9.8 6.8   .0 247.0 247.0         Recent Labs   Lab 02/27/24  0430 02/27/24 2046 02/28/24  0657   * 162* 130*   BUN 31* 30* 32*   CREATSERUM 1.43* 1.26 1.35*   EGFRCR 48* 56* 51*   CA 8.5 7.9* 8.0*   * 131* 131*   K 4.4 3.1* 3.7  3.7    102 102   CO2 16.0* 21.0 22.0       Recent Labs   Lab 02/26/24  1200 02/27/24  0430 02/27/24 2046 02/28/24  0657   * 283* 257* 313*   * 558* 358* 412*   ALB 2.4* 2.5* 1.8* 2.0*         Imaging:  XR CHEST AP PORTABLE  (CPT=71045)    Result Date: 2/27/2024  CONCLUSION:  1. When compared to 2/26/2024 chest radiograph, increasing bilateral airspace opacities may represent edematous or infectious process, correlate clinically.   LOCATION:  [id number]      Dictated by (CST): Kelly Rob MD on 2/27/2024 at 2:17 PM     Finalized by (CST): Kelly Rob MD on 2/27/2024 at 2:19 PM       US ABDOMEN COMPLETE (CPT=76700)    Result Date: 2/27/2024  CONCLUSION:  1. There is a small amount of ascites around liver and spleen. 2. There is right pleural effusion. 3. Mild gallbladder wall thickening is noted.  There are no shadowing stones and sonographic [place] sign was negative.  This is a nonspecific finding in the setting of ascites.   LOCATION:  [facility]    Dictated by (CST): Agus Ferguson MD on 2/27/2024 at 11:27 AM     Finalized by (CST): Agus Ferguson MD on 2/27/2024 at 11:29 AM       CT ABDOMEN+PELVIS(CONTRAST ONLY)(CPT=74177)    Result Date:  2/26/2024  CONCLUSION:   1. Geographic areas of cortical hypoattenuation throughout the left kidney, representing new findings compared to 11/06/2023.  Given advanced calcified atherosclerosis of the abdominal aorta and major arterial branches, findings may reflect multifocal renal cortical infarcts.  Differential includes multifocal acute pyelonephritis.  Correlate with clinical evidence of active infection.  No organized intrarenal or perinephric collections.  2. Engorgement of intrahepatic IVC and hepatic veins with somewhat heterogeneous hepatic perfusion indicating passive hepatic congestion with associated periportal edema.  3. Mild right and small left pleural effusions.   Findings discussed with Dr. Galvin at 2:40 p.m. Central standard time on 02/26/2024.   LOCATION:  [facility]   Dictated by (CST): Eddie Hernandez MD on 2/26/2024 at 2:29 PM     Finalized by (CST): Eddie Hernandez MD on 2/26/2024 at 2:43 PM       XR CHEST AP PORTABLE  (CPT=71045)    Result Date: 2/26/2024  CONCLUSION:  Small left basilar consolidation may represent a developing pneumonia.   LOCATION:  [facility]      Dictated by (CST): Rich Zurita MD on 2/26/2024 at 1:08 PM     Finalized by (CST): Rich Zurita MD on 2/26/2024 at 1:09 PM          Meds:      [START ON 2/29/2024] multivitamin with minerals  1 tablet Oral Daily    furosemide  40 mg Intravenous Once    insulin aspart  1-5 Units Subcutaneous TID AC and HS    [Held by provider] atorvastatin  40 mg Oral Nightly    dilTIAZem HCl ER  180 mg Oral Daily    levothyroxine  25 mcg Oral Before breakfast    [Held by provider] rivaroxaban  15 mg Oral Daily with food    piperacillin-tazobactam  3.375 g Intravenous Q8H      dilTIAZem 5 mg/hr (02/28/24 1017)     dilTIAZem, acetaminophen, acetaminophen **OR** acetaminophen **OR** acetaminophen, glucose **OR** glucose **OR** glucose-vitamin C **OR** dextrose **OR** glucose **OR** glucose **OR** glucose-vitamin C, polyethylene glycol (PEG 3350),  sennosides, bisacodyl, fleet enema, ondansetron, metoclopramide

## 2024-02-28 NOTE — OCCUPATIONAL THERAPY NOTE
Attempted to see pt for skilled OT services this date. Pt is currently not appropriate for skilled OT at this time due to elevated BP. Will re-attempt as schedule allows. Penny Hackett, 02/28/24, 11:34 AM

## 2024-02-28 NOTE — PROGRESS NOTES
Patient received at change of shift. Had 1 x episode of rigors over night with a-fib RVR and HTN. IV tylenol, demerol given - rigors subsided. EKG obtained & IV lopressor given. VS now stable, afebrile overnight.

## 2024-02-29 NOTE — PROGRESS NOTES
[facility] Hospitalist Progress Note     CC: Hospital Follow up    PCP: Agus Martinez MD       Assessment/Plan:     Principal Problem:    Urinary tract infection without hematuria, site unspecified  Active Problems:    Dehydration    Elevated liver enzymes    Fever, unspecified fever cause    Lactic acid acidosis    Pyelonephritis    Elevated INR    Longstanding persistent atrial fibrillation (HCC)    Right upper quadrant abdominal pain    Elevated LFTs    Chronic heart failure with preserved ejection fraction (HCC)        Patient is a 88 year old male with PMH sig for hypothyroidism, type 2 diabetes, bradycardia status post pacemaker, A-fib on Xarelto who presents for evaluation of fever chills and generalized weakness. Managed for septic shock 2/2 pyelonephritis     Septic shock- resolved  Elevated lactate- resolved  Likely secondary to acute pyelonephritis, right  Fevers and chills  - Status post 2 L bolus in the ER, proBNP elevated,   - Lactate elevated, repeat now clear  - Broad-spectrum antibiotics, cont IV Zosyn, dc IV vancomycin  - CT abdomen pelvis suggestive of acute pyelonephritis, UA grossly abnormal  - Blood cultures x 2 NGTD, urine cultures multiple species  - Still with rigors, may consider ID consult if does not improve    Acute hypoxic respiratory failure  Pleural effusion  - Large pleural effusion on the right and moderate pleural effusion on the left  - Plan for thoracentesis   - wean o2 as tolerated    Hypothyroidism  - Continue levothyroxine if able to take p.o.     Type 2 diabetes with hyperglycemia  - ISS, Accu-Cheks     Arrhythmia  Bradycardia status post pacemaker  A-fib with RVR  - Xarelto held previously now resumed  - Started on diltiazem drip, wean as tolerated  - Diltiazem and metoprolol to continue with parameters  - Cardiology on consult, appreciate recommendations     Suspect acute on chronic diastolic heart failure  - proBNP elevated  - Will hold diuretics at this time  - 2D echo-  reviewed  - Telemetry     Dysphagia  - History of esophageal stricture that was dilated few months ago  - Recently treated with esophageal candidiasis  - Still unable to tolerate solids  - Speech evaluation  - GI on consult    Elevated liver enzymes  - Possibly from shock?  - Ultrasound liver- with small ascites  - trend liver enzymes,  - Appreciate GI consult     Advance care planning  - POA: Son  - CODE STATUS: DNR/select-discussed with patient and his family at bedside and on presentation        FN:  - IVF: hold  - Diet: swallow eval     DVT Prophy: SCDs, xarelto  Atrophy: PT/OT  Lines: PIV     Dispo:  icu  Outpatient records or previous hospital records reviewed.      Further recommendations pending patient's clinical course.  [facility] hospitalist to continue to follow patient while in house. Discussed with grandson and at bedside.     Patient and/or patient's family given opportunity to ask questions and note understanding and agreeing with therapeutic plan as outlined        Thank You,  DO [facility]  Answering Service number: [phone]     Subjective:      No CP, SOB, or N/V. Still intermittently in afib with rvr.  Has rigors intermittently.      OBJECTIVE:    Blood pressure 118/66, pulse 73, temperature 97.6 °F (36.4 °C), temperature source Temporal, resp. rate 18, height 5' 10\" (1.778 m), weight 164 lb 10.9 oz (74.7 kg), SpO2 94%.    Temp:  [97.6 °F (36.4 °C)-100 °F (37.8 °C)] 97.6 °F (36.4 °C)  Pulse:  [] 73  Resp:  [18-33] 18  BP: (105-179)/() 118/66  SpO2:  [90 %-100 %] 94 %      Intake/Output:    Intake/Output Summary (Last 24 hours) at 2/29/2024 1417  Last data filed at 2/29/2024 1250  Gross per 24 hour   Intake 692.9 ml   Output 2975 ml   Net -2282.1 ml       Last 3 Weights   02/29/24 0200 164 lb 10.9 oz (74.7 kg)   02/28/24 0500 163 lb 2.3 oz (74 kg)   02/27/24 0313 157 lb 10.1 oz (71.5 kg)   02/26/24 2043 168 lb (76.2 kg)   02/26/24 1151 160 lb (72.6 kg)    11/06/23 2257 144 lb 3.2 oz (65.4 kg)   11/06/23 1645 160 lb (72.6 kg)   04/30/23 0537 179 lb 7.3 oz (81.4 kg)   04/29/23 0640 182 lb 12.2 oz (82.9 kg)   04/28/23 0810 161 lb 9.6 oz (73.3 kg)   04/26/23 0805 180 lb 12.4 oz (82 kg)       Exam   Gen: No acute distress, alert and oriented x3, no focal neurologic deficits, ill appearing  Heent: NC AT, mucous memb clear, neck supple  Pulm: Supplemental oxygen, crackles at the bases bilaterally  CV: Heart with regular rate and rhythm, no peripheral edema  Abd: Abdomen soft, nontender, nondistended, no organomegaly, bowel sounds present  MSK: moving all extremities  Skin: no rashes or lesions, some bruises noted from blood draws  Neuro: AO2-3, motor intact, no sensory deficits  Psyc: appropriate mood and affect      Data Review:       Labs:     Recent Labs   Lab 02/27/24  0430 02/28/24 0657 02/29/24  0356   RBC 4.74 4.97 4.69   HGB 11.0* 11.6* 10.8*   HCT 35.0* 35.2* 33.5*   MCV 73.8* 70.8* 71.4*   MCH 23.2* 23.3* 23.0*   MCHC 31.4 33.0 32.2   RDW 17.1 17.2 16.9   NEPRELIM 8.39* 6.18 4.31   WBC 9.8 6.8 5.2   .0 247.0 234.0         Recent Labs   Lab 02/27/24 2046 02/28/24  0657 02/29/24  0356   * 130* 197*   BUN 30* 32* 27*   CREATSERUM 1.26 1.35* 1.33*   EGFRCR 56* 51* 52*   CA 7.9* 8.0* 8.2*   * 131* 131*   K 3.1* 3.7  3.7 3.4*  3.4*    102 104   CO2 21.0 22.0 20.0*       Recent Labs   Lab 02/26/24  1200 02/27/24  0430 02/27/24 2046 02/28/24  0657 02/29/24  0356   * 283* 257* 313* 315*   * 558* 358* 412* 323*   ALB 2.4* 2.5* 1.8* 2.0* 1.9*   LDH  --   --   --   --  260*         Imaging:  XR CHEST AP PORTABLE  (CPT=71045)    Result Date: 2/29/2024  CONCLUSION:  1. Status post right-sided thoracentesis with resolution of right effusion. 2. No postprocedural pneumothorax or acute process. 3. Stable pleural parenchymal changes within the left lung base. 4. Stable pulmonary vascular congestion and pulmonary edema.   LOCATION:   [facility]      Dictated by (CST): Foster Mehta DO on 2/29/2024 at 1:17 PM     Finalized by (CST): Foster Mehta DO on 2/29/2024 at 1:18 PM       CT CHEST (FKJ=06914)    Result Date: 2/29/2024  CONCLUSION:  Large right and moderate size left pleural effusion, including some loculation of the effusion on the left with atelectasis and lung infiltrates.  No sign of pneumothorax.  Thickening of the esophagus may reflect esophagitis.  Reactive lymph  nodes are present.  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 When compared to 2/26/2024 chest radiograph, increasing bilateral airspace opacities may represent edematous or infectious process, correlate clinically.   LOCATION:  [facility]      Dictated by (CST): Kelly Rob MD on 2/27/2024 at 2:17 PM     Finalized by (CST): Kelly Rob MD on 2/27/2024 at 2:19 PM       US ABDOMEN COMPLETE (CPT=76700)    Result Date: 2/27/2024  CONCLUSION:  1. There is a small amount of ascites around liver and spleen. 2. There is right pleural effusion. 3. Mild gallbladder wall thickening is noted.  There are no shadowing stones and sonographic [place] sign was negative.  This is a nonspecific finding in the setting of ascites.   LOCATION:  [facility]    Dictated by (CST): Agus Ferguson MD on 2/27/2024 at 11:27 AM     Finalized by (CST): Agus Ferguson MD on 2/27/2024 at 11:29 AM       CT ABDOMEN+PELVIS(CONTRAST ONLY)(CPT=74177)    Result Date: 2/26/2024  CONCLUSION:   1. Geographic areas of cortical hypoattenuation throughout the left kidney, representing new findings compared to 11/06/2023.  Given advanced calcified atherosclerosis of the abdominal aorta and major arterial branches, findings may reflect multifocal renal cortical infarcts.  Differential  includes multifocal acute pyelonephritis.  Correlate with clinical evidence of active infection.  No organized intrarenal or perinephric collections.  2. Engorgement of intrahepatic IVC and hepatic veins with somewhat heterogeneous hepatic perfusion indicating passive hepatic congestion with associated periportal edema.  3. Mild right and small left pleural effusions.   Findings discussed with Dr. Galvin at 2:40 p.m. Central standard time on 02/26/2024.   LOCATION:  [facility]   Dictated by (CST): Eddie Hernandez MD on 2/26/2024 at 2:29 PM     Finalized by (CST): Eddie Hernandez MD on 2/26/2024 at 2:43 PM          Meds:      rivaroxaban  15 mg Oral Daily with food    multivitamin with minerals  1 tablet Oral Daily    insulin aspart  1-5 Units Subcutaneous TID AC and HS    [Held by provider] atorvastatin  40 mg Oral Nightly    dilTIAZem HCl ER  180 mg Oral Daily    levothyroxine  25 mcg Oral Before breakfast    piperacillin-tazobactam  3.375 g Intravenous Q8H      dilTIAZem 2.5 mg/hr (02/29/24 1358)     dilTIAZem, ipratropium-albuterol, acetaminophen, acetaminophen **OR** acetaminophen **OR** acetaminophen, glucose **OR** glucose **OR** glucose-vitamin C **OR** dextrose **OR** glucose **OR** glucose **OR** glucose-vitamin C, polyethylene glycol (PEG 3350), sennosides, bisacodyl, fleet enema, ondansetron, metoclopramide

## 2024-02-29 NOTE — CONSULTS
[facility]  Report of Consultation    Mahsa Zhang Patient Status:  Inpatient    1935 MRN [id number]   [facility] 4SW-A Attending Veronica Hood,    Hosp Day # 3 PCP Agus Martinez MD     Reason for Consultation:   AFib    History of Present Illness:   Mahsa Zhang is a(n) 88 year old male with a past history of HTN, HL, Hypothyroidism, and DMII.  Pt has had a history of SSS and AFib.  Pt has undergone PPM implant due to SSS in the past.  Pt presents with sepsis felt secondary to pyelonephritis and PNA.  During hospitalization AF with RVR noted at times. Pt primarily Jimmy speaking.  Family translates and supplements Hx.  No chest pain, pressure, heaviness, or tightness.  No SOB at rest.  Pt is relatively sedentary.  Walks with a walker. Has had occasional falls - last 1 month ago.  Simple fall with no LOC.  No palpitations despite AF.  No edema, orthopnea, or PND.        Past Medical History:   Past Medical History:   Diagnosis Date    Arrhythmia     BRADYCARDIA VS AFIB    Disorder of thyroid     Endocrine disorder     hypothyroid    Fracture of left calcaneus 2016    Resolved last addressed  16    Hearing impairment     High blood pressure     Osteoarthrosis, unspecified whether generalized or localized, unspecified site     Stroke (HCC) 2014    TIA    Thyroid disease     Type II or unspecified type diabetes mellitus without mention of complication, not stated as uncontrolled     Unspecified essential hypertension     Unspecified sleep apnea     Visual impairment     glasses       Social History:   Smoking:  None  Alcohol:  None    Family History:   No family history of premature arthrosclerotic heart disease     Medications:   Scheduled:    rivaroxaban  15 mg Oral Daily with food    multivitamin with minerals  1 tablet Oral Daily    insulin aspart  1-5 Units Subcutaneous TID AC and HS    [Held by provider] atorvastatin  40 mg Oral Nightly    dilTIAZem HCl ER  180  mg Oral Daily    levothyroxine  25 mcg Oral Before breakfast    piperacillin-tazobactam  3.375 g Intravenous Q8H       Continuous Infusion:    dilTIAZem Stopped (02/29/24 1250)       PRN Medications:   dilTIAZem, ipratropium-albuterol, acetaminophen, acetaminophen **OR** acetaminophen **OR** acetaminophen, glucose **OR** glucose **OR** glucose-vitamin C **OR** dextrose **OR** glucose **OR** glucose **OR** glucose-vitamin C, polyethylene glycol (PEG 3350), sennosides, bisacodyl, fleet enema, ondansetron, metoclopramide    Outpatient Medications:   No current facility-administered medications on file prior to encounter.     Current Outpatient Medications on File Prior to Encounter   Medication Sig Dispense Refill    traMADol 50 MG Oral Tab Take 1 tablet (50 mg total) by mouth daily.      finasteride 5 MG Oral Tab Take 1 tablet (5 mg total) by mouth daily.      metoprolol tartrate 50 MG Oral Tab Take 1 tablet (50 mg total) by mouth 2x Daily(Beta Blocker). 90 tablet 0    rivaroxaban (XARELTO) 15 MG Oral Tab TAKE 1 TABLET BY MOUTH DAILY WITH FOOD 90 tablet 0    Ferrous Sulfate 325 (65 Fe) MG Oral Tab Take 1 tablet (325 mg total) by mouth 2 (two) times daily.      dilTIAZem HCl  MG Oral Capsule SR 24 Hr Take 1 capsule (180 mg total) by mouth daily. 30 capsule 1    levothyroxine 25 MCG Oral Tab Take 1 tablet (25 mcg total) by mouth before breakfast.      tamsulosin (FLOMAX) cap TAKE 1 CAPSULE BY MOUTH DAILY (Patient taking differently: Take 1 capsule (0.4 mg total) by mouth at bedtime.) 90 capsule 3    JANUVIA 25 MG Oral Tab TAKE 1 TABLET(25 MG) BY MOUTH DAILY 90 tablet 3    ATORVASTATIN 40 MG Oral Tab TAKE 1 TABLET BY MOUTH EVERY NIGHT AT BEDTIME 90 tablet 3    METFORMIN HCL 1000 MG Oral Tab TAKE 1 TABLET BY MOUTH TWICE DAILY WITH MEALS 180 tablet 3    aspirin (ASPIRIN EC LOW DOSE) 81 MG Oral Tab EC Take 1 tablet (81 mg total) by mouth once daily. 90 tablet 3    meclizine 12.5 MG Oral Tab Take 1 tablet (12.5 mg  total) by mouth 3 (three) times daily as needed for Dizziness.      triamcinolone 0.1 % External Cream Apply 1 Application topically 2 (two) times daily as needed (legs and feet).      Glucose Blood (ONETOUCH ULTRA BLUE) In Vitro Strip Test blood glucose once daily  Diag E11.9 100 strip 3    OneTouch UltraSoft Lancets Does not apply Misc Test blood glucose once daily  Diag E11.9 100 each 3    Chelsea Microlet Lancets Does not apply Misc Check blood glucose daily  diag E11.9 100 each 3    Glucose Blood (CONTOUR NEXT TEST) In Vitro Strip Check blood glucose daily  diag E11.9 100 each 3    Glucose Blood (CHELSEA CONTOUR NEXT TEST) In Vitro Strip Use to check blood glucose once daily in the morning before breakfast 100 strip 3    CHELSEA MICROLET LANCETS Does not apply Misc Use to check blood glucose once daily in the morning before breakfast 100 each 3       Allergies:   No Known Allergies    Review of Systems:   No fevers, chills, change in weight or bowel habits.  Ten point review of systems is otherwise negative or unremarkable.    Physical Exam:   Vitals:    02/29/24 1300   BP: 108/56   Pulse: 61   Resp: 20   Temp:      Wt Readings from Last 3 Encounters:   02/29/24 164 lb 10.9 oz (74.7 kg)   11/06/23 144 lb 3.2 oz (65.4 kg)   04/30/23 179 lb 7.3 oz (81.4 kg)           General: Well developed, well nourished male.  Pt is in no acute distress.  HEENT:   Normocephalic.  Atraumatic.  Eyes with no scleral icterus.  Neck: Supple.  No JVD.  Carotids 2+ and equal in symmetric fashion.  No bruits are noted.  Cardiac: Irregular rate and rhythm.   There is a normal S1 and S2.  No S3 or S4.  No murmurs, rubs, or gallops.  PMI is non-displaced with a normal apical impulse.  Lungs: Clear to ascultation bilaterally.  No focal rales, rhonchi, or wheezes.  Good air movement is noted throughout all lung fields.  Abdomen: Soft.  Non-distended.  Non-tender.  Bowel sounds are present and normoactive.  No guarding or rebound.   Extremities:  Extremities do not demonstrate any evidence of peripheral edema.   No cyanosis or clubbing of the digits is appreciated.  Femoral, Dorsalis Pedis, and Posterior Tibialis  pulses are 2+ and equal in a symmetric fashion.  Neurologic: Alert and oriented, normal affect.  No gross deficit appreciated.  Integument:  No visible rashes are appreciated.      Laboratories and Data:   Labs:    Recent Labs   Lab 02/27/24 2046 02/28/24  0657 02/29/24  0356   * 130* 197*   BUN 30* 32* 27*   CREATSERUM 1.26 1.35* 1.33*   CA 7.9* 8.0* 8.2*   ALB 1.8* 2.0* 1.9*   * 131* 131*   K 3.1* 3.7  3.7 3.4*  3.4*    102 104   CO2 21.0 22.0 20.0*   ALKPHO 172* 203* 249*   * 412* 323*   * 313* 315*   BILT 1.0 1.4 1.2   TP 5.5* 6.0* 5.7*       Recent Labs   Lab 02/27/24 0430 02/28/24  0657 02/29/24  0356   RBC 4.74 4.97 4.69   HGB 11.0* 11.6* 10.8*   HCT 35.0* 35.2* 33.5*   MCV 73.8* 70.8* 71.4*   MCH 23.2* 23.3* 23.0*   MCHC 31.4 33.0 32.2   RDW 17.1 17.2 16.9   NEPRELIM 8.39* 6.18 4.31   WBC 9.8 6.8 5.2   .0 247.0 234.0       Recent Labs   Lab 02/27/24 0430 02/28/24  0657 02/29/24  0356   PTP 69.8* 33.8* 22.6*   INR 8.16* 3.27* 1.97*       No results for input(s): \"TROP\", \"CK\" in the last 168 hours.    Diagnostics:   Tele:  AFib - pacing does not always seem sensing   EKG 2/27/24: AFib rate 141.  Low voltage.  Non-specific T abn.   Echo:   Conclusions:     1. Left ventricle: The cavity size was normal. Wall thickness was at the      upper limits of normal. Systolic function was normal. The estimated      ejection fraction was 50-55%, by visual assessment. No diagnostic      evidence for regional wall motion abnormalities. Unable to assess LV      diastolic function due to heart rhythm.   2. Right ventricle: The cavity size was increased. Pacer wire noted in the      right ventricle. The RV pressure during systole is 56mm Hg.   3. Left atrium: The left atrial volume was mildly increased.   4. Aortic  valve: There was mild regurgitation.   5. Tricuspid valve: There was moderate regurgitation.   6. Ascending aorta: The ascending aorta was 3.9cm diameter.       Assessment:   Atrial Fibrillation  -Rate controlled on IV CCB  -On Xarelto  2.     SSS   -S/P PPM   -Possible undersensing  3.    Sepsis   -Pyelonephritis   -PNA  4.    HTN Hx  5.    Hypothyroidism  6.    DM  7.    Anemia  8.    Coagulopathy  9.    Elevated LFTs  10.  NL EF  11.  Pulmonary HTN  12.  Pleural effusion   -S/P Thora today  13.  HFpEF    Plan:   IV Cardizem rate control  -Transition to PO once septic picture clears further  2.     PPM check  3.     Xarelto  4.     Statin on hold in setting of elevated LFTs  5.     Monitor volume status  6.     IV Lasix given this AM  7.     ATBx  8.     Tele monitor    Live Duong MD  2/29/2024  1:42 PM

## 2024-02-29 NOTE — PROGRESS NOTES
Daily Pulmonary/ICU/Critical Care Progress Note          SUBJECTIVE:  Continues to have intermittent chills and fevers.   On dilt gtt d/t uncontrolled afib.  Grandson at bedside.       ALLERGIES:  No Known Allergies      MEDS:  Home Medications:  Outpatient Medications Marked as Taking for the 2/26/24 encounter (Hospital Encounter)   Medication Sig Dispense Refill    traMADol 50 MG Oral Tab Take 1 tablet (50 mg total) by mouth daily.      finasteride 5 MG Oral Tab Take 1 tablet (5 mg total) by mouth daily.      metoprolol tartrate 50 MG Oral Tab Take 1 tablet (50 mg total) by mouth 2x Daily(Beta Blocker). 90 tablet 0    rivaroxaban (XARELTO) 15 MG Oral Tab TAKE 1 TABLET BY MOUTH DAILY WITH FOOD 90 tablet 0    Ferrous Sulfate 325 (65 Fe) MG Oral Tab Take 1 tablet (325 mg total) by mouth 2 (two) times daily.      dilTIAZem HCl  MG Oral Capsule SR 24 Hr Take 1 capsule (180 mg total) by mouth daily. 30 capsule 1    levothyroxine 25 MCG Oral Tab Take 1 tablet (25 mcg total) by mouth before breakfast.      tamsulosin (FLOMAX) cap TAKE 1 CAPSULE BY MOUTH DAILY (Patient taking differently: Take 1 capsule (0.4 mg total) by mouth at bedtime.) 90 capsule 3    JANUVIA 25 MG Oral Tab TAKE 1 TABLET(25 MG) BY MOUTH DAILY 90 tablet 3    ATORVASTATIN 40 MG Oral Tab TAKE 1 TABLET BY MOUTH EVERY NIGHT AT BEDTIME 90 tablet 3    METFORMIN HCL 1000 MG Oral Tab TAKE 1 TABLET BY MOUTH TWICE DAILY WITH MEALS 180 tablet 3    aspirin (ASPIRIN EC LOW DOSE) 81 MG Oral Tab EC Take 1 tablet (81 mg total) by mouth once daily. 90 tablet 3     Scheduled Medication:   rivaroxaban  15 mg Oral Daily with food    multivitamin with minerals  1 tablet Oral Daily    insulin aspart  1-5 Units Subcutaneous TID AC and HS    [Held by provider] atorvastatin  40 mg Oral Nightly    dilTIAZem HCl ER  180 mg Oral Daily    levothyroxine  25 mcg Oral Before breakfast    piperacillin-tazobactam  3.375 g Intravenous Q8H     Continuous Infusing Medication:    dilTIAZem Stopped (02/29/24 1250)     PRN Medications:  dilTIAZem, ipratropium-albuterol, acetaminophen, acetaminophen **OR** acetaminophen **OR** acetaminophen, glucose **OR** glucose **OR** glucose-vitamin C **OR** dextrose **OR** glucose **OR** glucose **OR** glucose-vitamin C, polyethylene glycol (PEG 3350), sennosides, bisacodyl, fleet enema, ondansetron, metoclopramide       PHYSICAL EXAM:  /56   Pulse 61   Temp 97.6 °F (36.4 °C) (Temporal)   Resp 20   Ht 5' 10\" (1.778 m)   Wt 164 lb 10.9 oz (74.7 kg)   SpO2 93%   BMI 23.63 kg/m²        CONSTITUTIONAL: alert, oriented, no apparent distress  HEENT: atraumatic normocephalic  MOUTH: mucous membranes are moist. No OP exudates  NECK/THROAT: no JVD. Trachea midline. No obvious thyromegaly  LUNG: clear b/l no wheezing. Diminished bases. Chest symmetric with respiratory motion  HEART: regular rate and rhythm, no obvious murmers or gallops noted  ABD: soft non tender. + bowel sounds. No organomegaly noted  EXT: no clubbing, cyanosis, or edema noted. Pulses intact grossly  NEURO/MUSCULOSKELETAL: no gross deficits  SKIN: warm, dry. No obvious lesions noted  LYMPH: no obvious LAD      IMAGES:   Reviewed in EMR      LABS:  Recent Labs   Lab 02/27/24  0430 02/28/24  0657 02/29/24  0356   RBC 4.74 4.97 4.69   HGB 11.0* 11.6* 10.8*   HCT 35.0* 35.2* 33.5*   MCV 73.8* 70.8* 71.4*   MCH 23.2* 23.3* 23.0*   MCHC 31.4 33.0 32.2   RDW 17.1 17.2 16.9   NEPRELIM 8.39* 6.18 4.31   WBC 9.8 6.8 5.2   .0 247.0 234.0       Recent Labs   Lab 02/27/24  2046 02/28/24  0657 02/29/24  0356   * 130* 197*   BUN 30* 32* 27*   CREATSERUM 1.26 1.35* 1.33*   EGFRCR 56* 51* 52*   CA 7.9* 8.0* 8.2*   ALB 1.8* 2.0* 1.9*   * 131* 131*   K 3.1* 3.7  3.7 3.4*  3.4*    102 104   CO2 21.0 22.0 20.0*   ALKPHO 172* 203* 249*   * 412* 323*   * 313* 315*   BILT 1.0 1.4 1.2   TP 5.5* 6.0* 5.7*       ASSESSMENT/PLAN:  Sepsis due to pyelonephritis and  PNA  -Trend LA-normalized  -IVF bolus PRN  -Stopped vanco and continue zosyn   -Given persistent fevers, chills, checked chest CT which shows consolidations and and b/l pleural effusions. Plan for right and possible left thoracentesis. Continue abx. May need ID consult     2. Abdominal pain RUQ  -Elevated LFTs  -Abdominal US with mild GB thickening but no stones or torres's sign      3. Elevated INR  -Had been holding Xarelto, but can restart after thoracentesis   -no signs of bleeding  -INR daily     4. HFpEF, HTN, HL, AFib  -Cardizem gtt. Add BB for better control  -Cardiology consulted     5. DM2  -Accu-check with ISS     F/E/N:    -IV fluids  -Follow lytes and replete prn  -Diet     Proph:  -Xarelto held due to elevated INR. Can restart   -SCD     Dispo:   Code Status: DNAR/Selective Treatment  -Hold off on transfer out of ICU today     Thank you for the opportunity to care for Mahsa Staton DO, MPH  Pulmonary Critical Care Medicine

## 2024-02-29 NOTE — PLAN OF CARE
Assumed care of pt at approximately 1930. Received pt resting in bed on 2L NC. Family at bedside helping w/translation. Pt A&Ox4. Febrile, -160's and HTN, icu apn notified, see orders. prn cardizem given, cardizem gtt restarted, titrated to pt specific HR/BP goal, titrated off during shift, and prn tyelnol and demerol given. Bmx1. External catheter intact. Electrolytes replaced per protocol. Family at bedside overnight. Transfer orders.

## 2024-02-29 NOTE — PLAN OF CARE
Pt alert and oriented x4. Jimmy speaking. Weaning o2 as able. CT chest- pleural effusion. Thoracentesis at bedside per Dr. Staton, 1300ml received from right lung. Cultures sent. No episode of rigors this shift, HR 120s-130s with hypertension at start of shift. Cardizem gtt restarted, continue per cards. Metoprolol given x1. HR and BP responded well. PPM interrogated. Marcos held this am for thora. Tylenol given for discomfort. GI consulted for elevated LFTs, per family pt has been on liquid diet at home for esophageal strictures. Loose stools continue. Voiding well. US kidneys/bladder done.    1835: Pt began to shiver without fever. Increased HR and BP compared throughout shift. APRN notified. Tylenol PO and lopressor given, see MAR. This am worked well for this.

## 2024-02-29 NOTE — CM/SW NOTE
WILIAN spoke to pt's son Cleveland, who primarily handles pt's healthcare needs. Cleveland confirmed that pt uses [facility]. Agency reserved and BRENNA orders sent. Contact information for agency added to pt's DC AVS/ Pt's son voiced they may need additional help at home for pt, depending on pt's ability to manage ADLs at time of DC.   Private duty caregivers discussed. Caregiver list emailed to pt's son at [email or web address]    WILIAN/ARASH will remain available for DC planning and/or support.     PRERNA Arora, CMSRN    [id number]

## 2024-02-29 NOTE — DISCHARGE INSTRUCTIONS
Sometimes managing your health at home requires assistance.  The [facility] team has recognized your preference to use [facility].  They can be reached by phone at [phone].  The fax number for your reference is [phone].. A representative from the home health agency will contact you or your family to schedule your first visit.

## 2024-02-29 NOTE — BRIEF PROCEDURE NOTE
Thoracentesis Procedure Note:                 Procedure: Ultrasound Guided Right Sided Thoracentesis     Performed by: Dr. DIANA Grijalva Shemar DO     Indications: right pleural effusion        Patient consent: Indications and risks discussed with the PATIENT (with family to help translate at bedside) who understands the risks and benefits of the procedure and consents.        Preparation and Technique:     Informed consent was obtained and verified in the chart prior to procedure. Patient medication list reviewed. The patient's platelet count, and INR were reviewed. After reviewing risks and benefits, the patient was deemed satisfactory condition to undergo the procedure. A time out was performed and chest imaging reviewed. Ultrasound guidance was used to identify anatomical structures and the right sided pleural effusion was confirmed and site marked on the patient's skin. The patient was then prepped and draped in a sterile manner using chlorhexidine scrubs.  Sterile gloves and mask were used by all operators. A total of 10cc of 1% lidocaine was used to anesthesize the skin, subcutaneous tissue, superior aspect of the rib periosteum and parietal pleura. A finder needle was then introduced over the superior aspect of the rib to locate the pleural fluid; a straw-colored pleural fluid was aspirated. 11-blade scalpel was used to nick the skin at the insertion site. The Safe-T- Centesis needle was then introduced and advanced through the skin incision into the pleural space using negative aspiration pressure. The thoracentesis catheter was then threaded without difficulty.  A total of 1300 cc of tea-colored fluid was removed without difficulty.  The catheter was then removed. No immediate complications were noted during the procedure. A post-procedure chest x-ray is pending. Pleural fluid sent for studies (Light's criteria, micro and cytology).     Complications: None. Follow up CXR     Estimated blood loss: none        DIANA  Valentine Staton DO, MPH  Pulmonary Critical Care Medicine

## 2024-02-29 NOTE — CONSULTS
[facility] IP Report of Consultation Gastroenterology    2/29/2024    Mahsa Zhang  male   Billy Lyman DO     [id number]  8/13/1935 Primary Care Physician  Agus Martinez MD     Reason for Consultation: Mahsa Zhang is a 88 year old male with PMHx significant for Bradycardia, Afib, PPM, HTN, CVA, DM2, HL, MALCOLM who presented to ED with fever and SOB. GI has been consulted for evaluation for poor PO intake and elevated LFT's. Patient has history of atrial fibrillation and on xarelto. He has sepsis from pyelonephritis and currently on IV antibiotics. Patient has history of esophageal strictures and dilation was performed. Patient in October 2023 had esophageal candidiasis treated with diflucan       PROBLEM LIST:     Patient Active Problem List   Diagnosis    TIA (transient ischemic attack)    Essential hypertension    MALCOLM on CPAP    Cardiac pacemaker in situ    Vertebral artery disease (HCC)    SSS (sick sinus syndrome) (HCC)    Pulmonary hypertension (HCC)    Left ventricular diastolic dysfunction with preserved systolic function    Hypothyroidism, adult    Chronic diastolic heart failure (HCC)    Uncontrolled type 2 diabetes mellitus with hyperglycemia, without long-term current use of insulin (HCC)    Microalbuminuria due to type 2 diabetes mellitus (HCC)    Aortic atherosclerosis (HCC)    Chronic atrial fibrillation (HCC)    Type 2 diabetes mellitus with circulatory disorder, without long-term current use of insulin (HCC)    Aortic root dilation (HCC)    Urinary frequency    Secondary hypercoagulable state (HCC)    Primary osteoarthritis of both knees    Chronic anticoagulation    Right rotator cuff tear arthropathy    Mixed hyperlipidemia    Bilateral carotid artery stenosis    COVID-19    Elevated troponin    Gastrointestinal hemorrhage, unspecified gastrointestinal hemorrhage type    Multiple falls    Blunt head trauma, initial encounter    Abdominal pain, acute    JARVIS (acute kidney injury) (HCC)    Type  2 diabetes mellitus with hyperglycemia, with long-term current use of insulin (HCC)    Hypokalemia    Hyponatremia    Hyperglycemia    Anemia    Leukocytosis    Acute renal failure (ARF) (HCC)    Acute kidney injury (HCC)    Azotemia    Chest pain of uncertain etiology    Palliative care encounter    Goals of care, counseling/discussion    Urinary tract infection without hematuria, site unspecified    Dehydration    Elevated liver enzymes    Fever, unspecified fever cause    Lactic acid acidosis    Pyelonephritis    Elevated INR    Longstanding persistent atrial fibrillation (HCC)    Right upper quadrant abdominal pain    Elevated LFTs    Chronic heart failure with preserved ejection fraction (HCC)       PATIENT HISTORY:     Past Medical History:   Diagnosis Date    Arrhythmia     BRADYCARDIA VS AFIB    Disorder of thyroid     Endocrine disorder     hypothyroid    Fracture of left calcaneus 6/29/2016    Resolved last addressed  7/25/16    Hearing impairment     High blood pressure     Osteoarthrosis, unspecified whether generalized or localized, unspecified site     Stroke ([facility]) July 26, 2014    TIA    Thyroid disease     Type II or unspecified type diabetes mellitus without mention of complication, not stated as uncontrolled     Unspecified essential hypertension     Unspecified sleep apnea     Visual impairment     glasses      Past Surgical History:   Procedure Laterality Date    CARDIAC PACEMAKER PLACEMENT      REMV CATARACT EXTRACAP,INSERT LENS Right 9/2/2015    Procedure: RIGHT PHACOEMULSIFICATION OF CATARACT WITH INTRAOCULAR LENS IMPLANT [id number];  Surgeon: Louis Jason MD;  Location: [facility]    REMV CATARACT EXTRACAP,INSERT LENS Left 9/16/2015    Procedure: LEFT PHACOEMULSIFICATION OF CATARACT WITH INTRAOCULAR LENS IMPLANT [id number];  Surgeon: Louis Jason MD;  Location: [facility]      Family History   Problem Relation Age of Onset    Cancer Brother       Social History      Socioeconomic History    Marital status:    Tobacco Use    Smoking status: Former     Types: Cigarettes     Quit date: 1984     Years since quittin.6    Smokeless tobacco: Former    Tobacco comments:     tobacco   Vaping Use    Vaping Use: Never used   Substance and Sexual Activity    Alcohol use: No    Drug use: No     Social Determinants of Health     Food Insecurity: No Food Insecurity (2023)    Food Insecurity     Food Insecurity: Never true   Transportation Needs: No Transportation Needs (2023)    Transportation Needs     Lack of Transportation: No   Housing Stability: Low Risk  (2023)    Housing Stability     Housing Instability: No        Allergies;  No Known Allergies     Medications:    Current Facility-Administered Medications:     rivaroxaban (Xarelto) tab 15 mg, 15 mg, Oral, Daily with food    dilTIAZem (cardIZEM) 25 mg/5mL injection 10 mg, 10 mg, Intravenous, Q6H PRN    [COMPLETED] dilTIAZem 10 mg BOLUS FROM BAG infusion, 10 mg, Intravenous, Once **AND** dilTIAZem (cardIZEM) 100 mg in sodium chloride 0.9% 100 mL IVPB-ADDV, 2.5-20 mg/hr, Intravenous, Continuous    multivitamin with minerals (Thera M Plus) tab 1 tablet, 1 tablet, Oral, Daily    ipratropium-albuterol (Duoneb) 0.5-2.5 (3) MG/3ML inhalation solution 3 mL, 3 mL, Nebulization, Q6H PRN    acetaminophen (Ofirmev) 10 mg/mL infusion premix 1,000 mg, 1,000 mg, Intravenous, Q6H PRN    acetaminophen (Tylenol) tab 650 mg, 650 mg, Oral, Q4H PRN **OR** acetaminophen (Tylenol) 160 MG/5ML oral liquid 650 mg, 650 mg, Oral, Q4H PRN **OR** acetaminophen (Tylenol) rectal suppository 650 mg, 650 mg, Rectal, Q4H PRN    glucose (Dex4) 15 GM/59ML oral liquid 15 g, 15 g, Oral, Q15 Min PRN **OR** glucose (Glutose) 40% oral gel 15 g, 15 g, Oral, Q15 Min PRN **OR** glucose-vitamin C (Dex-4) chewable tab 4 tablet, 4 tablet, Oral, Q15 Min PRN **OR** dextrose 50% injection 50 mL, 50 mL, Intravenous, Q15 Min PRN **OR** glucose (Dex4)  15 GM/59ML oral liquid 30 g, 30 g, Oral, Q15 Min PRN **OR** glucose (Glutose) 40% oral gel 30 g, 30 g, Oral, Q15 Min PRN **OR** glucose-vitamin C (Dex-4) chewable tab 8 tablet, 8 tablet, Oral, Q15 Min PRN    insulin aspart (NovoLOG) 100 Units/mL FlexPen 1-5 Units, 1-5 Units, Subcutaneous, TID AC and HS    polyethylene glycol (PEG 3350) (Miralax) 17 g oral packet 17 g, 17 g, Oral, Daily PRN    sennosides (Senokot) tab 17.2 mg, 17.2 mg, Oral, Nightly PRN    bisacodyl (Dulcolax) 10 MG rectal suppository 10 mg, 10 mg, Rectal, Daily PRN    fleet enema (Fleet) 7-19 GM/118ML rectal enema 133 mL, 1 enema, Rectal, Once PRN    ondansetron (Zofran) 4 MG/2ML injection 4 mg, 4 mg, Intravenous, Q6H PRN    metoclopramide (Reglan) 5 mg/mL injection 10 mg, 10 mg, Intravenous, Q8H PRN    [Held by provider] atorvastatin (Lipitor) tab 40 mg, 40 mg, Oral, Nightly    dilTIAZem ER (CardIZEM CD) 24 hr cap 180 mg, 180 mg, Oral, Daily    levothyroxine (Synthroid) tab 25 mcg, 25 mcg, Oral, Before breakfast    piperacillin-tazobactam (Zosyn) 3.375 g in dextrose 5% 100 mL IVPB-ADDV, 3.375 g, Intravenous, Q8H     REVIEW OF SYSTEMS:   GENERAL: well developed, well nourished, in no apparent distress  HEENT: normocephalic; normal nose, pharynx and TM's  EYES: PERRLA, EOMI, sclera anicteric, conjunctiva normal; fundi normal  NECK: supple, FROM, no nodes, no JVD, no thyromegaly, no carotid bruits  RESPIRATORY: clear to percussion and auscultation  CARDIOVASCULAR: S1, S2 normal, RRR; no S3, no S4; no click; murmur negative  ABDOMEN: normal, active bowel sounds, no masses, HSM or tenderness  RECTAL: ---  EXTREMITIES: no cyanosis, clubbing or edema, peripheral pulses intact  PSYCHIATRIC: alert, oriented times 3        EXAM:   /56   Pulse 61   Temp 97.6 °F (36.4 °C) (Temporal)   Resp 20   Ht 5' 10\" (1.778 m)   Wt 164 lb 10.9 oz (74.7 kg)   SpO2 93%   BMI 23.63 kg/m²  Body mass index is 23.63 kg/m².  GENERAL: Well developed, well nourished,  in no obvious distress.   ABDOMEN: Bowel sounds normoactive. Soft, no organomegaly or masses appreciated. Nontender.   EXTREMITIES: Without cyanosis. No peripheral edema appreciated.   RECTAL: Deferred.   NEURO: Motor and Gait grossly intact. Alert and Oriented x 3.        LAB/IMAGING RESULTS:     Lab Results   Component Value Date    WBC 5.2 02/29/2024    HGB 10.8 02/29/2024    HCT 33.5 02/29/2024    .0 02/29/2024     [unfilled]  Lab Results   Component Value Date    WBC 5.2 02/29/2024    HGB 10.8 02/29/2024    HCT 33.5 02/29/2024    .0 02/29/2024    CREATSERUM 1.33 02/29/2024    BUN 27 02/29/2024     02/29/2024    K 3.4 02/29/2024    K 3.4 02/29/2024     02/29/2024    CO2 20.0 02/29/2024     02/29/2024    CA 8.2 02/29/2024    ALB 1.9 02/29/2024    ALKPHO 249 02/29/2024    BILT 1.2 02/29/2024    TP 5.7 02/29/2024     02/29/2024     02/29/2024    INR 1.97 02/29/2024    PTP 22.6 02/29/2024    PGLU 202 02/29/2024         ASSESSMENT AND PLAN:   Esophageal dysphagia with history of esophageal candidiasis and esophageal strictueres s/p EGD in October 2023     2.   Elevated liver function tests: ' '  T. Bili 1.2 Alk Phos 249    US ABDOMEN 2/27/2024  There is a small amount of ascites around liver and spleen. There is right pleural effusion.  Mild gallbladder wall thickening is noted.  There are no shadowing stones and sonographic [place] sign was negative.  This is a nonspecific finding in the setting of ascites.     CT ABDOMEN AND PELVIS 2/26/2024    PLAN:  - Case d/w son (C[place]) over phone. Hold off on EGD at this time and monitor how patient eats with diet that dietary has recommended. Patient does have history of esophageal stricture with dilation and candidal esophagitis. Son wants to hold off on EGD. If patient doesn't get better with eating in the next few days, then GI will recommend EGD under MAC  - Check viral hepatitis panel   - Pantoprazole 40 mg  daily   - Avoid NSAID's     The patient indicates understanding of these issues and agrees to the plan.  Billy Lyman DO  2/29/2024  2:04 PM

## 2024-03-01 NOTE — PLAN OF CARE
LETTY, rhonchi, increased O2 needs; increased to 15L without much improvement.     C C apn notified; RT notified.     Pt placed on bipap by RT.

## 2024-03-01 NOTE — PLAN OF CARE
Assumed care of pt at approximately 1930. Received pt resting in bed on 5L. Pt A&Ox4. One episode of rigors, -160's w/HTN and increased O2 needs at start of shift, Cardizem titrated to pt specific HR and BP goal. Prn lopressor given, HR responded well. External catheter intact, see I&O. Family at bedside overnight. See flowsheet/MAR.

## 2024-03-01 NOTE — CONSULTS
[facility]   part of [facility] Infectious Disease Consult    Mahsa Zhang Patient Status:  Inpatient    1935 MRN [id number]   Location [facility] 4SW-A Attending Veronica Hood, DO   Hosp Day # 4 PCP Agus Martinez MD     Reason for Consultation:  To help with infection and treatment, requested by Dr. Hood,     History of Present Illness:  Mahsa Zhang, a 88 year old male admitted to [facility] on  with fever, chills and generalized weakness of two days duration,   Significant hx of  - DM- Uncontrolled,   - Bradycardia, S/P PPM.  - A fib,   Admitted with generalized weakness, fever, chills, also flank pain, which was R sided at that time,   No sick contacts, nausea, vomiting and SOB, No dysuria,   On admission he was found to be hypotensive, elevated LA, abn LFTs, UA was abn, he was admitted to ICU for close observation,   Initially started on IV Vanc and Zosyn, now IV Vanc has been discontinued,   S/P thoracentesis, on bipap right now, not interactive, most of hist taken from family at the bed side,   Last abx use was 6 weeks ago for Cellulitis of LE,   ID is now asked to help with infection and treatment,     History:  Past Medical History:   Diagnosis Date    Arrhythmia     BRADYCARDIA VS AFIB    Disorder of thyroid     Endocrine disorder     hypothyroid    Fracture of left calcaneus 2016    Resolved last addressed  16    Hearing impairment     High blood pressure     Osteoarthrosis, unspecified whether generalized or localized, unspecified site     Stroke (HCC) 2014    TIA    Thyroid disease     Type II or unspecified type diabetes mellitus without mention of complication, not stated as uncontrolled     Unspecified essential hypertension     Unspecified sleep apnea     Visual impairment     glasses     Past Surgical History:   Procedure Laterality Date    CARDIAC PACEMAKER PLACEMENT      REMV CATARACT EXTRACAP,INSERT LENS Right 2015    Procedure: RIGHT  PHACOEMULSIFICATION OF CATARACT WITH INTRAOCULAR LENS IMPLANT 90882;  Surgeon: Louis Jason MD;  Location: [facility] [place]    REMV CATARACT EXTRACAP,INSERT LENS Left 9/16/2015    Procedure: LEFT PHACOEMULSIFICATION OF CATARACT WITH INTRAOCULAR LENS IMPLANT 24008;  Surgeon: Louis Jason MD;  Location: [facility]     Family History   Problem Relation Age of Onset    Cancer Brother       reports that he quit smoking about 39 years ago. He has quit using smokeless tobacco. He reports that he does not drink alcohol and does not use drugs.    Allergies:  No Known Allergies    Medications:    Current Facility-Administered Medications:     [Held by provider] rivaroxaban (Xarelto) tab 15 mg, 15 mg, Oral, Daily with food    metoprolol (Lopressor) 5 mg/5mL injection 5 mg, 5 mg, Intravenous, Q6H PRN    dilTIAZem (cardIZEM) 25 mg/5mL injection 10 mg, 10 mg, Intravenous, Q6H PRN    [COMPLETED] dilTIAZem 10 mg BOLUS FROM BAG infusion, 10 mg, Intravenous, Once **AND** dilTIAZem (cardIZEM) 100 mg in sodium chloride 0.9% 100 mL IVPB-ADDV, 2.5-20 mg/hr, Intravenous, Continuous    multivitamin with minerals (Thera M Plus) tab 1 tablet, 1 tablet, Oral, Daily    ipratropium-albuterol (Duoneb) 0.5-2.5 (3) MG/3ML inhalation solution 3 mL, 3 mL, Nebulization, Q6H PRN    acetaminophen (Ofirmev) 10 mg/mL infusion premix 1,000 mg, 1,000 mg, Intravenous, Q6H PRN    acetaminophen (Tylenol) tab 650 mg, 650 mg, Oral, Q4H PRN **OR** acetaminophen (Tylenol) 160 MG/5ML oral liquid 650 mg, 650 mg, Oral, Q4H PRN **OR** acetaminophen (Tylenol) rectal suppository 650 mg, 650 mg, Rectal, Q4H PRN    glucose (Dex4) 15 GM/59ML oral liquid 15 g, 15 g, Oral, Q15 Min PRN **OR** glucose (Glutose) 40% oral gel 15 g, 15 g, Oral, Q15 Min PRN **OR** glucose-vitamin C (Dex-4) chewable tab 4 tablet, 4 tablet, Oral, Q15 Min PRN **OR** dextrose 50% injection 50 mL, 50 mL, Intravenous, Q15 Min PRN **OR** glucose (Dex4) 15 GM/59ML oral liquid  30 g, 30 g, Oral, Q15 Min PRN **OR** glucose (Glutose) 40% oral gel 30 g, 30 g, Oral, Q15 Min PRN **OR** glucose-vitamin C (Dex-4) chewable tab 8 tablet, 8 tablet, Oral, Q15 Min PRN    insulin aspart (NovoLOG) 100 Units/mL FlexPen 1-5 Units, 1-5 Units, Subcutaneous, TID AC and HS    polyethylene glycol (PEG 3350) (Miralax) 17 g oral packet 17 g, 17 g, Oral, Daily PRN    sennosides (Senokot) tab 17.2 mg, 17.2 mg, Oral, Nightly PRN    bisacodyl (Dulcolax) 10 MG rectal suppository 10 mg, 10 mg, Rectal, Daily PRN    fleet enema (Fleet) 7-19 GM/118ML rectal enema 133 mL, 1 enema, Rectal, Once PRN    ondansetron (Zofran) 4 MG/2ML injection 4 mg, 4 mg, Intravenous, Q6H PRN    metoclopramide (Reglan) 5 mg/mL injection 10 mg, 10 mg, Intravenous, Q8H PRN    [Held by provider] atorvastatin (Lipitor) tab 40 mg, 40 mg, Oral, Nightly    dilTIAZem ER (CardIZEM CD) 24 hr cap 180 mg, 180 mg, Oral, Daily    levothyroxine (Synthroid) tab 25 mcg, 25 mcg, Oral, Before breakfast    piperacillin-tazobactam (Zosyn) 3.375 g in dextrose 5% 100 mL IVPB-ADDV, 3.375 g, Intravenous, Q8H    Review of Systems:   Constitutional: Negative for anorexia, chills, fatigue, fevers, malaise, night sweats and weight loss.  Eyes: Negative for visual disturbance, irritation and redness.  Ears, nose, mouth, throat, and face: Negative for hearing loss, tinnitus, nasal congestion, snoring, sore throat, hoarseness and voice change.  Respiratory: Negative for cough, sputum, hemoptysis, chest pain, wheezing, dyspnea on exertion, or stridor.  Cardiovascular: Negative for chest pain, palpitations, irregular heart beats, syncope, fatigue, orthopnea, paroxysmal nocturnal dyspnea, lower extremity edema.  Gastrointestinal: Negative for dysphagia, odynophagia, reflux symptoms, nausea, vomiting, change in bowel habits, diarrhea, constipation and abdominal pain.  Integument/breast: Negative for rash, skin lesions, and pruritus.  Hematologic/lymphatic: Negative for easy  bruising, bleeding, and lymphadenopathy.  Musculoskeletal: Negative for myalgias, arthralgias, muscle weakness.  Neurological: Negative for headaches, dizziness, seizures, memory problems, trouble swallowing, speech problems, gait problems and weakness.  Behavioral/Psych: Negative for active tobacco use.  Endocrine: No history of of diabetes, thyroid disorder.  Not able to obtain,     Vital signs in last 24 hours:  Patient Vitals for the past 24 hrs:   BP Temp Temp src Pulse Resp SpO2 Weight   03/01/24 1100 106/54 -- -- 77 (!) 27 98 % --   03/01/24 1014 103/51 -- -- 88 (!) 35 99 % --   03/01/24 1000 115/61 -- -- 81 26 99 % --   03/01/24 0900 153/81 -- -- 119 (!) 30 100 % --   03/01/24 0845 (!) 179/87 100.3 °F (37.9 °C) -- (!) 138 (!) 33 98 % --   03/01/24 0800 (!) 170/90 99.3 °F (37.4 °C) -- 110 (!) 27 91 % --   03/01/24 0700 (!) 167/95 -- -- (!) 128 (!) 28 93 % --   03/01/24 0630 (!) 159/102 -- -- (!) 127 (!) 27 92 % --   03/01/24 0600 159/86 -- -- 104 26 94 % --   03/01/24 0530 156/83 -- -- 105 24 94 % --   03/01/24 0500 151/65 -- -- 102 25 95 % --   03/01/24 0430 142/79 98.8 °F (37.1 °C) Temporal 97 (!) 28 91 % --   03/01/24 0400 143/74 -- -- 94 26 93 % --   03/01/24 0330 139/60 -- -- 92 (!) 27 94 % --   03/01/24 0300 124/63 -- -- 91 24 94 % --   03/01/24 0230 133/52 -- -- 92 25 94 % --   03/01/24 0200 129/67 -- -- 89 (!) 31 96 % --   03/01/24 0130 131/59 -- -- 94 26 94 % --   03/01/24 0100 145/71 -- -- 97 25 94 % --   03/01/24 0030 151/75 99.1 °F (37.3 °C) Temporal 109 25 94 % 161 lb 2.5 oz (73.1 kg)   03/01/24 0000 145/68 -- -- 101 23 94 % --   02/29/24 2330 152/75 -- -- 105 (!) 30 92 % --   02/29/24 2300 135/66 -- -- 91 24 95 % --   02/29/24 2230 149/69 -- -- 98 25 95 % --   02/29/24 2200 153/76 -- -- 106 25 95 % --   02/29/24 2130 (!) 137/94 -- -- 118 25 93 % --   02/29/24 2100 (!) 166/92 -- -- (!) 131 (!) 30 93 % --   02/29/24 2030 (!) 174/92 -- -- (!) 141 (!) 47 94 % --   02/29/24 2002 (!) 177/96 99.3 °F  (37.4 °C) Temporal (!) 134 (!) 31 (!) 89 % --   02/29/24 1947 -- 98.8 °F (37.1 °C) Temporal (!) 126 (!) 31 91 % --   02/29/24 1900 150/79 -- -- 94 26 90 % --   02/29/24 1846 -- -- -- 110 19 (!) 89 % --   02/29/24 1844 -- -- -- 107 (!) 28 (!) 89 % --   02/29/24 1830 (!) 168/60 97.5 °F (36.4 °C) -- 107 25 90 % --   02/29/24 1800 153/68 -- -- 103 25 91 % --   02/29/24 1730 138/63 -- -- 92 19 93 % --   02/29/24 1700 140/67 -- -- 88 18 92 % --   02/29/24 1630 142/64 -- -- 79 20 94 % --   02/29/24 1600 124/65 97.5 °F (36.4 °C) Temporal 78 21 94 % --   02/29/24 1530 137/46 -- -- 84 18 93 % --   02/29/24 1500 129/65 -- -- 74 20 95 % --   02/29/24 1430 109/69 -- -- 67 26 96 % --   02/29/24 1400 118/66 -- -- 73 18 94 % --   02/29/24 1330 122/60 -- -- 77 20 95 % --   02/29/24 1300 108/56 -- -- 61 20 93 % --   02/29/24 1230 105/49 -- -- 71 25 100 % --       Physical Exam:   General: alert, cooperative, oriented.  On bipap   Head: Normocephalic, without obvious abnormality, atraumatic.   Eyes: Conjunctivae/corneas clear.  No scleral icterus.  No conjunctival     hemorrhage.   Nose: Nares normal.   Throat: Lips, mucosa, and tongue normal.  No thrush noted.   Neck: Soft, supple neck; trachea midline, no adenopathy, no thyromegaly.   Lungs: CTAB, normal and equal bilateral chest rise   Chest wall: No tenderness or deformity.   Heart:  Regular rate, rhythm, normal S1S2, no murmur.   Abdomen: soft, non-tender, non-distended, positive BS.   Extremity: ++ edema, Venous stasis dermatitis varicose veins L > R    Skin: No rashes or lesions.   Neurological: Alert, interactive, no focal deficits    Labs:  Lab Results   Component Value Date    WBC 5.0 03/01/2024    HGB 11.7 03/01/2024    HCT 34.3 03/01/2024    .0 03/01/2024    CREATSERUM 1.22 03/01/2024    BUN 24 03/01/2024     03/01/2024    K 4.0 03/01/2024     03/01/2024    CO2 19.0 03/01/2024     03/01/2024    CA 8.8 03/01/2024    ALB 1.9 03/01/2024    ALKPHO 364  03/01/2024    BILT 1.3 03/01/2024    TP 5.8 03/01/2024     03/01/2024     03/01/2024    INR 1.49 03/01/2024       Radiology:  CT- 1. Geographic areas of cortical hypoattenuation throughout the left kidney, representing new findings compared to 11/06/2023.  Given advanced calcified atherosclerosis of the abdominal aorta and major arterial branches, findings may reflect multifocal renal cortical infarcts.  Differential includes multifocal acute pyelonephritis.  Correlate with clinical evidence of active infection.  No organized intrarenal or perinephric collections.      2. Engorgement of intrahepatic IVC and hepatic veins with somewhat heterogeneous hepatic perfusion indicating passive hepatic congestion with associated periportal edema.     CT Chest- Large right and moderate size left pleural effusion, including some loculation of the effusion on the left with atelectasis and lung infiltrates.  No  pneumothorax.Thickening of the esophagus may reflect esophagitis. Reactive LN are present.  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  Fever with possible Septic Shock:  - Admitted with two- three days of fever, chills, R sided Flank pain,   - On PE L sided Flank tenderness > R   - UA abn, Cul are NGTD  - Blood cul are NGTD  - S/P thoracentesis and fluid does not appear infected,   - LFTs are abn, were wnl before 2/20/24  - Imaging with Pleural effusions, Possible acute Pyelonephritis of L side, small volume of free fluid in abdomen,   - Possible Viral syndrome causing abn LFTs, along with Pyelonephritis vs multifocal cortical infarcts, TTE with no vegetation,   - Will get Procal, RVP, agree with IV Zosyn for now,     2.    DM: Uncontrolled, Needs optimal management,     3,    Elevated Creatine: resolved now,     4.    Acute Resp failure; On Bipap,   - Sec to Pleural effusion now S/P thoracentesis,   - No witnessed aspiration, Will  get RVP,     5.    Disposition: in house, an elderly male admitted with fever and chills, later transferred to ICU sec to low BP, also hypoxic now which can be sec to AMS vs Viral syndrome too, possible low BP responsible for abn LFTs and JARVIS, being worked up for source of infection,   - Follow Pending cul,   - PSA,  - RVP, CMV, EBV serologies, PCR  - Agree with IV Zosyn, pharm to dose,   - Supportive care,     Dicussed with patient's sons and wife at the bed side, all questions answered further recommendations to follow, Thanks,      Thank you for consulting [facility] for Mahsa Zhang.  If you have any questions or concerns please call [facility] and [facility] Infectious Disease at [phone].     Bandar Watts MD  3/1/2024  12:01 PM

## 2024-03-01 NOTE — PROGRESS NOTES
[facility] Hospitalist Progress Note     CC: Hospital Follow up    PCP: Agus Martinez MD       Assessment/Plan:     Principal Problem:    Urinary tract infection without hematuria, site unspecified  Active Problems:    Dehydration    Elevated liver enzymes    Fever, unspecified fever cause    Lactic acid acidosis    Pyelonephritis    Elevated INR    Longstanding persistent atrial fibrillation (HCC)    Right upper quadrant abdominal pain    Elevated LFTs    Chronic heart failure with preserved ejection fraction (HCC)        Patient is a 88 year old male with PMH sig for hypothyroidism, type 2 diabetes, bradycardia status post pacemaker, A-fib on Xarelto who presents for evaluation of fever chills and generalized weakness. Managed for septic shock 2/2 pyelonephritis     Septic shock- resolved  Elevated lactate- resolved  Likely secondary to acute pyelonephritis, right  Fevers and chills  - Lactate elevated, repeat now clear  - Broad-spectrum antibiotics, cont IV Zosyn, dc IV vancomycin  - CT abdomen pelvis suggestive of acute pyelonephritis, UA grossly abnormal  - Blood cultures x 2 NGTD, urine cultures multiple species  - Still with rigors, and further oxygen desaturations,  - ID on consult, appreciate recommendations    Acute hypoxic respiratory failure  Pleural effusion bilaterally  - Possible aspiration pneumonitis?  - Status post thoracentesis on the right 2/29  - Plan for left thoracentesis 3/1  - Pleural fluid analysis suggestive of transudative fluid, concern for heart failure  -Currently requiring BiPAP, wean as tolerated  - Pulmonology following, recommendations reviewed    Hypothyroidism  - Continue levothyroxine if able to take p.o.     Type 2 diabetes with hyperglycemia  - ISS, Accu-Cheks     Arrhythmia  Bradycardia status post pacemaker  A-fib with RVR  - Xarelto held previously now resumed once thoracentesis completed  - Started on diltiazem drip, wean as tolerated  - Diltiazem and metoprolol to continue with  parameters  - Cardiology on consult, appreciate recommendations     Suspect acute on chronic diastolic heart failure  - proBNP elevated  - IV Lasix  - 2D echo- reviewed  - Telemetry     Dysphagia  - History of esophageal stricture that was dilated few months ago  - Recently treated with esophageal candidiasis  - Still unable to tolerate solids  - Speech evaluation  - GI on consult-family deferring EGD at this time.  Will focus on acute issues for now    Elevated liver enzymes  - Possibly from shock?  - Ultrasound liver- with small ascites  - trend liver enzymes,  - Workup per GI, recommendations reviewed     Advance care planning  - POA: Son  - CODE STATUS: DNR/select-discussed with patient and his family at bedside and on presentation        FN:  - IVF: hold  - Diet: swallow eval     DVT Prophy: SCDs, xarelto-to be resumed once thoracentesis complete  Atrophy: PT/OT  Lines: PIV     Dispo:  icu  Outpatient records or previous hospital records reviewed.      Further recommendations pending patient's clinical course.  DM hospitalist to continue to follow patient while in house. Discussed with son at bedside     Patient and/or patient's family given opportunity to ask questions and note understanding and agreeing with therapeutic plan as outlined        Thank You,  DO William Chino Hospitalist  Answering Service number: [phone]     Subjective:      No CP, SOB, or N/V.  Intermittently with fevers and rigors    OBJECTIVE:    Blood pressure 106/54, pulse 73, temperature 100.3 °F (37.9 °C), resp. rate 23, height 5' 10\" (1.778 m), weight 161 lb 2.5 oz (73.1 kg), SpO2 97%.    Temp:  [97.5 °F (36.4 °C)-100.3 °F (37.9 °C)] 100.3 °F (37.9 °C)  Pulse:  [] 73  Resp:  [18-47] 23  BP: (103-179)/() 106/54  SpO2:  [89 %-100 %] 97 %      Intake/Output:    Intake/Output Summary (Last 24 hours) at 3/1/2024 1431  Last data filed at 3/1/2024 1000  Gross per 24 hour   Intake 847.9 ml   Output 2525 ml   Net  -1677.1 ml       Last 3 Weights   03/01/24 0030 161 lb 2.5 oz (73.1 kg)   02/29/24 0200 164 lb 10.9 oz (74.7 kg)   02/28/24 0500 163 lb 2.3 oz (74 kg)   02/27/24 0313 157 lb 10.1 oz (71.5 kg)   02/26/24 2043 168 lb (76.2 kg)   02/26/24 1151 160 lb (72.6 kg)   11/06/23 2257 144 lb 3.2 oz (65.4 kg)   11/06/23 1645 160 lb (72.6 kg)   04/30/23 0537 179 lb 7.3 oz (81.4 kg)   04/29/23 0640 182 lb 12.2 oz (82.9 kg)   04/28/23 0810 161 lb 9.6 oz (73.3 kg)   04/26/23 0805 180 lb 12.4 oz (82 kg)       Exam   Gen: No acute distress, alert and oriented x3, no focal neurologic deficits, ill appearing  Heent: NC AT, mucous memb clear, neck supple  Pulm: Supplemental oxygen, crackles at the bases bilaterally, requiring BiPAP  CV: Heart with regular rate and rhythm, no peripheral edema  Abd: Abdomen soft, nontender, nondistended, no organomegaly, bowel sounds present  MSK: moving all extremities  Skin: no rashes or lesions, some bruises noted from blood draws  Neuro: AO2-3, motor intact, no sensory deficits, answers questions appropriately,  Psyc: appropriate mood and affect      Data Review:       Labs:     Recent Labs   Lab 02/28/24  0657 02/29/24  0356 03/01/24  0540   RBC 4.97 4.69 4.96   HGB 11.6* 10.8* 11.7*   HCT 35.2* 33.5* 34.3*   MCV 70.8* 71.4* 69.2*   MCH 23.3* 23.0* 23.6*   MCHC 33.0 32.2 34.1   RDW 17.2 16.9 17.2   NEPRELIM 6.18 4.31 4.04   WBC 6.8 5.2 5.0   .0 234.0 200.0         Recent Labs   Lab 02/28/24  0657 02/29/24  0356 02/29/24  1406 03/01/24  0540   * 197*  --  212*   BUN 32* 27*  --  24*   CREATSERUM 1.35* 1.33*  --  1.22   EGFRCR 51* 52*  --  58*   CA 8.0* 8.2*  --  8.8   * 131*  --  133*   K 3.7  3.7 3.4*  3.4* 4.3 4.0    104  --  106   CO2 22.0 20.0*  --  19.0*       Recent Labs   Lab 02/27/24  0430 02/27/24  2046 02/28/24  0657 02/29/24  0356 03/01/24  0540   * 257* 313* 315* 255*   * 358* 412* 323* 198*   ALB 2.5* 1.8* 2.0* 1.9* 1.9*   LDH  --   --   --  260*   --          Imaging:  US KIDNEY/BLADDER (CPT=76770)    Result Date: 2/29/2024  CONCLUSION:  1. There is protrusion of median lobe of prostate into bladder lumen which is most likely a large BPH nodule. 2. Kidneys are otherwise sonographically unremarkable.    LOCATION:  [facility]     Dictated by (CST): Agus Ferguson MD on 2/29/2024 at 2:50 PM     Finalized by (CST): Agus Ferguson MD on 2/29/2024 at 2:51 PM       XR CHEST AP PORTABLE  (CPT=71045)    Result Date: 2/29/2024  CONCLUSION:  1. Status post right-sided thoracentesis with resolution of right effusion. 2. No postprocedural pneumothorax or acute process. 3. Stable pleural parenchymal changes within the left lung base. 4. Stable pulmonary vascular congestion and pulmonary edema.   LOCATION:  [facility]      Dictated by (CST): Foster Mehta DO on 2/29/2024 at 1:17 PM     Finalized by (CST): Foster Mehta DO on 2/29/2024 at 1:18 PM       CT CHEST (BOD=09828)    Result Date: 2/29/2024  CONCLUSION:  Large right and moderate size left pleural effusion, including some loculation of the effusion on the left with atelectasis and lung infiltrates.  No sign of pneumothorax.  Thickening of the esophagus may reflect esophagitis.  Reactive lymph  nodes are present.  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  LOCATION:  [facility]   Dictated by (CST): Drake David MD on 2/29/2024 at 11:00 AM     Finalized by (CST): Drake David MD on 2/29/2024 at 11:08 AM          Meds:      [Held by provider] rivaroxaban  15 mg Oral Daily with food    multivitamin with minerals  1 tablet Oral Daily    insulin aspart  1-5 Units Subcutaneous TID AC and HS    [Held by provider] atorvastatin  40 mg Oral Nightly    [Held by provider] dilTIAZem HCl ER  180 mg Oral Daily    levothyroxine  25 mcg Oral Before breakfast    piperacillin-tazobactam  3.375 g Intravenous Q8H      dilTIAZem 5 mg/hr (03/01/24 1205)     metoprolol, dilTIAZem, ipratropium-albuterol, acetaminophen,  acetaminophen **OR** acetaminophen **OR** acetaminophen, glucose **OR** glucose **OR** glucose-vitamin C **OR** dextrose **OR** glucose **OR** glucose **OR** glucose-vitamin C, polyethylene glycol (PEG 3350), sennosides, bisacodyl, fleet enema, ondansetron, metoclopramide

## 2024-03-01 NOTE — PROGRESS NOTES
[facility]    Mahsa Zhang Patient Status:  Inpatient    1935 MRN [id number]   Location [facility] 4SW-A Attending Veronica Hood, DO   Hosp Day # 4 PCP Agus Martinez MD     Mahsa Zhang is a 88 year old male patient. On Bipap this morning. Son (Earnest) at bedside. patient recently had good appetite and no dysphagia symptoms. Currently on Bipap due to respiratory issues     1. Urinary tract infection without hematuria, site unspecified    2. Dehydration    3. Elevated liver enzymes    4. Fever, unspecified fever cause    5. Lactic acid acidosis        Past Medical History:   Diagnosis Date    Arrhythmia     BRADYCARDIA VS AFIB    Disorder of thyroid     Endocrine disorder     hypothyroid    Fracture of left calcaneus 2016    Resolved last addressed  16    Hearing impairment     High blood pressure     Osteoarthrosis, unspecified whether generalized or localized, unspecified site     Stroke (HCC) 2014    TIA    Thyroid disease     Type II or unspecified type diabetes mellitus without mention of complication, not stated as uncontrolled     Unspecified essential hypertension     Unspecified sleep apnea     Visual impairment     glasses       No current outpatient medications on file.     No Known Allergies  Principal Problem:    Urinary tract infection without hematuria, site unspecified  Active Problems:    Dehydration    Elevated liver enzymes    Fever, unspecified fever cause    Lactic acid acidosis    Pyelonephritis    Elevated INR    Longstanding persistent atrial fibrillation (HCC)    Right upper quadrant abdominal pain    Elevated LFTs    Chronic heart failure with preserved ejection fraction (HCC)    Blood pressure 106/54, pulse 77, temperature 100.3 °F (37.9 °C), resp. rate (!) 27, height 5' 10\" (1.778 m), weight 161 lb 2.5 oz (73.1 kg), SpO2 98%.        Physical Exam  GENERAL: On Bipap today  ABDOMEN: Bowel sounds normoactive. Soft, no organomegaly or masses  appreciated. Nontender.   EXTREMITIES: Without cyanosis. No peripheral edema appreciated.   RECTAL: Deferred.   NEURO: Motor and Gait grossly intact. Alert and Oriented x 3.    ASSESSMENT:   Esophageal dysphagia with history of esophageal candidiasis and esophageal strictueres s/p EGD in October 2023      2.   Elevated liver function tests: ' '  T. Bili 1.2 Alk Phos 249     US ABDOMEN 2/27/2024  There is a small amount of ascites around liver and spleen. There is right pleural effusion.  Mild gallbladder wall thickening is noted.  There are no shadowing stones and sonographic [place] sign was negative.  This is a nonspecific finding in the setting of ascites.      CT ABDOMEN AND PELVIS 2/26/2024     PLAN:  - Case d/w son ([place]) over phone on 2/28/2024 and spoke with son Earnest in person at bedside today. Hold off on EGD at this time and monitor how patient eats with diet that dietary has recommended. Patient does have history of esophageal stricture with dilation and candidal esophagitis. Currently patient does not have dysphagia.  Son wants to hold off on EGD.   - Pantoprazole 40 mg daily   - Avoid NSAID's   - GI will sign off. Please call if any questions or concerns     Billy Lyman DO  3/1/2024

## 2024-03-01 NOTE — PROGRESS NOTES
[facility]  Progress Note    Mahsa Zhang Patient Status:  Inpatient    1935 MRN [id number]   Location [facility] 4SW-A Attending Veronica Hood,    Hosp Day # 4 PCP Agus Martinez MD     Subjective:   Increased O2 requirements.  On BiPAP at this point.  No chest pain. No abdominal pain.  No new focal cardiovascular complaints reported.    Objective:  Vitals:    24 0630 24 0700 24 0800 24 0845   BP: (!) 159/102 (!) 167/95 (!) 170/90 (!) 179/87   BP Location:       Pulse: (!) 127 (!) 128 110 (!) 138   Resp: (!) 27 (!) 28 (!) 27 (!) 33   Temp:   99.3 °F (37.4 °C) 100.3 °F (37.9 °C)   TempSrc:       SpO2: 92% 93% 91% 98%   Weight:       Height:           Temp (24hrs), Av.7 °F (37.1 °C), Min:97.5 °F (36.4 °C), Max:100.3 °F (37.9 °C)      Medications:   Scheduled:    [Held by provider] rivaroxaban  15 mg Oral Daily with food    multivitamin with minerals  1 tablet Oral Daily    insulin aspart  1-5 Units Subcutaneous TID AC and HS    [Held by provider] atorvastatin  40 mg Oral Nightly    dilTIAZem HCl ER  180 mg Oral Daily    levothyroxine  25 mcg Oral Before breakfast    piperacillin-tazobactam  3.375 g Intravenous Q8H       Continuous Infusion:    dilTIAZem 15 mg/hr (24 0845)       PRN Medications:   metoprolol, dilTIAZem, ipratropium-albuterol, acetaminophen, acetaminophen **OR** acetaminophen **OR** acetaminophen, glucose **OR** glucose **OR** glucose-vitamin C **OR** dextrose **OR** glucose **OR** glucose **OR** glucose-vitamin C, polyethylene glycol (PEG 3350), sennosides, bisacodyl, fleet enema, ondansetron, metoclopramide    Intake/Output:     Intake/Output Summary (Last 24 hours) at 3/1/2024 0921  Last data filed at 3/1/2024 0834  Gross per 24 hour   Intake 967.9 ml   Output 3375 ml   Net -2407.1 ml       Wt Readings from Last 3 Encounters:   24 161 lb 2.5 oz (73.1 kg)   23 144 lb 3.2 oz (65.4 kg)   23 179 lb 7.3 oz (81.4 kg)        Allergies:  No Known Allergies    Physical Exam:   General:  Well-developed / Well-nourished.  No acute distress.  HEENT:  Normocephalic.  Atraumatic.  No icterus.  Neck:  There is no jugular venous distention.   Cardiovascular:  Cardiovascular examination demonstrates an irregular rate and rhythm.  There is normal S1, S2.  There is no S3 or S4.  There are no murmurs, rubs, or gallops.  No click is appreciated.  PMI is nondisplaced with a normal apical impulse.    Pulmonary:  Lungs are coarse on auscultation bilaterally.  There are no focal rales, rhonchi, or wheezes.  Good air movement is noted throughout both lung fields.   Abdomen:  The abdomen is soft, non-distended, and non-tender.  Bowel sounds are present and normoactive.  No organomegaly is appreciated.  Extremities:  Extremities do not demonstrate any evidence of peripheral edema.   No cyanosis or clubbing of the digits is appreciated.  Neurologic:  Alert and oriented.  Normal affect.  Integument:  No visible rashes are appreciated.      Laboratory/Data:   Recent Labs   Lab 02/28/24  0657 02/29/24  0356 02/29/24  1406 03/01/24  0540   * 197*  --  212*   BUN 32* 27*  --  24*   CREATSERUM 1.35* 1.33*  --  1.22   CA 8.0* 8.2*  --  8.8   ALB 2.0* 1.9*  --  1.9*   * 131*  --  133*   K 3.7  3.7 3.4*  3.4* 4.3 4.0    104  --  106   CO2 22.0 20.0*  --  19.0*   ALKPHO 203* 249*  --  364*   * 323*  --  198*   * 315*  --  255*   BILT 1.4 1.2  --  1.3   TP 6.1*  6.0* 5.7*  --  5.8*       Recent Labs   Lab 02/28/24  0657 02/29/24  0356 03/01/24  0540   RBC 4.97 4.69 4.96   HGB 11.6* 10.8* 11.7*   HCT 35.2* 33.5* 34.3*   MCV 70.8* 71.4* 69.2*   MCH 23.3* 23.0* 23.6*   MCHC 33.0 32.2 34.1   RDW 17.2 16.9 17.2   NEPRELIM 6.18 4.31 4.04   WBC 6.8 5.2 5.0   .0 234.0 200.0       Recent Labs   Lab 02/28/24  0657 02/29/24  0356 03/01/24  0540   PTP 33.8* 22.6* 18.2*   INR 3.27* 1.97* 1.49*       No results for input(s): \"TROP\",  \"CK\" in the last 168 hours.      Tele: AF - rate elevated with fever    Diagnostics:    Echo:   Conclusions:     1. Left ventricle: The cavity size was normal. Wall thickness was at the      upper limits of normal. Systolic function was normal. The estimated      ejection fraction was 50-55%, by visual assessment. No diagnostic      evidence for regional wall motion abnormalities. Unable to assess LV      diastolic function due to heart rhythm.   2. Right ventricle: The cavity size was increased. Pacer wire noted in the      right ventricle. The RV pressure during systole is 56mm Hg.   3. Left atrium: The left atrial volume was mildly increased.   4. Aortic valve: There was mild regurgitation.   5. Tricuspid valve: There was moderate regurgitation.   6. Ascending aorta: The ascending aorta was 3.9cm diameter.       Assessment:     Atrial Fibrillation  -Rate controlled on IV CCB  -Xarelto held for further thoracentesis  2.     SSS                -S/P PPM  3.    Sepsis                -Pyelonephritis                -PNA  4.    HTN Hx  5.    Hypothyroidism  6.    DM  7.    Anemia  8.    Coagulopathy  9.    Elevated LFTs  10.  NL EF  11.  Pulmonary HTN  12.  Pleural effusion                -S/P Thora 2/29/24  13.  HFpEF  14.  Respiratory insufficiency   -Component of CHF   -ARDS suspected   -On BiPAP    Plan:    IV CCB rate control   IV BB PRN if rates inadequately controlled on IV CCB with fevers   Further thoracentesis under consideration today   IV Lasix   BiPAP support   Consider ID assessment   Resume anticoagulation once there are no further procedures planned.    Live Duong MD  3/1/2024  9:21 AM

## 2024-03-01 NOTE — SLP NOTE
Order received for BSE to r/o aspiration however patient with worsening respiratory status and now on Bipap. Defer evaluation at this time and will re-attempt when stable, off bipap. Recommend NPO when on Bipap.   Leah Patel MS CCC-SLP/L, pager [phone]  Speech-Language Pathologist

## 2024-03-01 NOTE — PROGRESS NOTES
[facility]    Mahsa Zhang Patient Status:  Inpatient    1935 MRN [id number]   [facility] 4SW-A Attending Veronica Hood, DO   Hosp Day # 4 PCP Agus Martinez MD     SUBJECTIVE: overnight and into this morning, had desaturation with fever     OBJECTIVE:  BP (!) 179/87   Pulse 88   Temp 100.3 °F (37.9 °C)   Resp (!) 35   Ht 177.8 cm (5' 10\")   Wt 161 lb 2.5 oz (73.1 kg)   SpO2 99%   BMI 23.12 kg/m²   O2 requirement: on BIPAP     I/O last 3 completed shifts:  In: 1427.9 [P.O.:720; I.V.:207.9; IV PIGGYBACK:500]  Out: 5050 [Urine:3750; Drains:1300]  I/O this shift:  In: 100 [IV PIGGYBACK:100]  Out: -      Current Medications:   Current Facility-Administered Medications:     [Held by provider] rivaroxaban (Xarelto) tab 15 mg, 15 mg, Oral, Daily with food    metoprolol (Lopressor) 5 mg/5mL injection 5 mg, 5 mg, Intravenous, Q6H PRN    dilTIAZem (cardIZEM) 25 mg/5mL injection 10 mg, 10 mg, Intravenous, Q6H PRN    [COMPLETED] dilTIAZem 10 mg BOLUS FROM BAG infusion, 10 mg, Intravenous, Once **AND** dilTIAZem (cardIZEM) 100 mg in sodium chloride 0.9% 100 mL IVPB-ADDV, 2.5-20 mg/hr, Intravenous, Continuous    multivitamin with minerals (Thera M Plus) tab 1 tablet, 1 tablet, Oral, Daily    ipratropium-albuterol (Duoneb) 0.5-2.5 (3) MG/3ML inhalation solution 3 mL, 3 mL, Nebulization, Q6H PRN    acetaminophen (Ofirmev) 10 mg/mL infusion premix 1,000 mg, 1,000 mg, Intravenous, Q6H PRN    acetaminophen (Tylenol) tab 650 mg, 650 mg, Oral, Q4H PRN **OR** acetaminophen (Tylenol) 160 MG/5ML oral liquid 650 mg, 650 mg, Oral, Q4H PRN **OR** acetaminophen (Tylenol) rectal suppository 650 mg, 650 mg, Rectal, Q4H PRN    glucose (Dex4) 15 GM/59ML oral liquid 15 g, 15 g, Oral, Q15 Min PRN **OR** glucose (Glutose) 40% oral gel 15 g, 15 g, Oral, Q15 Min PRN **OR** glucose-vitamin C (Dex-4) chewable tab 4 tablet, 4 tablet, Oral, Q15 Min PRN **OR** dextrose 50% injection 50 mL, 50 mL, Intravenous, Q15 Min PRN  **OR** glucose (Dex4) 15 GM/59ML oral liquid 30 g, 30 g, Oral, Q15 Min PRN **OR** glucose (Glutose) 40% oral gel 30 g, 30 g, Oral, Q15 Min PRN **OR** glucose-vitamin C (Dex-4) chewable tab 8 tablet, 8 tablet, Oral, Q15 Min PRN    insulin aspart (NovoLOG) 100 Units/mL FlexPen 1-5 Units, 1-5 Units, Subcutaneous, TID AC and HS    polyethylene glycol (PEG 3350) (Miralax) 17 g oral packet 17 g, 17 g, Oral, Daily PRN    sennosides (Senokot) tab 17.2 mg, 17.2 mg, Oral, Nightly PRN    bisacodyl (Dulcolax) 10 MG rectal suppository 10 mg, 10 mg, Rectal, Daily PRN    fleet enema (Fleet) 7-19 GM/118ML rectal enema 133 mL, 1 enema, Rectal, Once PRN    ondansetron (Zofran) 4 MG/2ML injection 4 mg, 4 mg, Intravenous, Q6H PRN    metoclopramide (Reglan) 5 mg/mL injection 10 mg, 10 mg, Intravenous, Q8H PRN    [Held by provider] atorvastatin (Lipitor) tab 40 mg, 40 mg, Oral, Nightly    dilTIAZem ER (CardIZEM CD) 24 hr cap 180 mg, 180 mg, Oral, Daily    levothyroxine (Synthroid) tab 25 mcg, 25 mcg, Oral, Before breakfast    piperacillin-tazobactam (Zosyn) 3.375 g in dextrose 5% 100 mL IVPB-ADDV, 3.375 g, Intravenous, Q8H     General appearance: alert, appears stated age, cooperative, and on AVAPS  Lungs:  diminished bilaterally  Heart: regular rate and rhythm  Abdomen: soft, non-tender; bowel sounds normal; no masses,  no organomegaly  Extremities: venous stasis dermatitis noted     Lab Results   Component Value Date    WBC 5.0 03/01/2024    RBC 4.96 03/01/2024    HGB 11.7 03/01/2024    HCT 34.3 03/01/2024    MCV 69.2 03/01/2024    MCH 23.6 03/01/2024    MCHC 34.1 03/01/2024    RDW 17.2 03/01/2024    .0 03/01/2024     Lab Results   Component Value Date     03/01/2024    K 4.0 03/01/2024     03/01/2024    CO2 19.0 03/01/2024    BUN 24 03/01/2024    CREATSERUM 1.22 03/01/2024     03/01/2024    CA 8.8 03/01/2024    ALKPHO 364 03/01/2024     03/01/2024     03/01/2024    BILT 1.3 03/01/2024    ALB 1.9  03/01/2024    TP 5.8 03/01/2024     Lab Results   Component Value Date    INR 1.49 (H) 03/01/2024    INR 1.97 (H) 02/29/2024    INR 3.27 (H) 02/28/2024          Imaging: CXR: L effusion persists, bilateral infiltrates noted; pulm vasc congestion and edema     ASSESSMENT/PLAN:  Sepsis due to pyelonephritis and PNA  -Trend LA-normalized  -IVF bolus PRN  -MRSA negative, stopped vanco; continue zosyn   -chest CT showed consolidations and pleural effusions.  Acute hypoxic resp failure- due to PNA/CHF/sepsis/effusions  - desaturated in midst of fevers and required initiation of NIPPV  - wean O2 as tolerated  Pleural effusions- bilateral  - s/p R thoracentesis on 2/28 with 1.3L of transudative fluid c/w CHF  - needs ongoing diuresis  - plan for thoracentesis on L  Abdominal pain RUQ  -Elevated LFTs  -Abdominal US with mild GB thickening but no stones or torres's sign   - GI consulted; w/u ongoing  Coagulopathy- with elevated INR  -Had been holding Xarelto, but can restart after thoracentesis   -no signs of bleeding  -INR daily  HFpEF, HTN, HL, AFib  -Cardizem gtt. BB for better control  -Cardiology following  DM2 -Accu-check with ISS  F/E/N:  IV fluids, follow lytes and replete prn, ADAT when off NIPPV  Proph: Xarelto held due to elevated INR initially and now for thora on L, SCD  Dispo: DNAR/[facility]  - critically ill; cont with ICU care  - crit care time: 35 min  - d/w family    Faustino Palacios MD  3/1/2024  11:28 AM

## 2024-03-01 NOTE — PROGRESS NOTES
03/01/24 1257   BiPAP   $ RT Standby Charge (per 15 min) 1   Device V60   BiPAP / CPAP CE# 4   BiPAP bacteria filter Yes   BIPAP plugged into main power? Yes   Mode Spontaneous/Timed   Interface Full face mask   Mask Size Large   Control Settings   Set Rate 12 breaths/min   Set IPAP 12   Set EPAP 5   Oxygen Percent (S)  40 %   Inspiratory time 0.8   Insp rise time 3   SIPAP   Resp 23   BiPAP/CPAP Alarm Settings   Hi Rate 45   Low Rate 10   Hi VT 1200   Low    Hi Pressure 25   Low Pressure 7   Low Pressure Delay 20   Low MV 2   BiPAP/CPAP Monitored Parameters   Pulse 73   SpO2 96 %   PIP 13   Total Rate 23 breaths/min   Minute Volume 20   Tidal Volume 865   Total Leak 28   Trigger % 97   Ti/Ttot % 39   Toleration Well     Pt remain stable on Bipap with above setting. Will try weaning as pt tolerate.

## 2024-03-02 NOTE — PROGRESS NOTES
[facility]   part of [facility] Infectious Disease  Progress Note    Mahsa Zhang Patient Status:  Inpatient    1935 MRN [id number]   Location [facility] 4SW-A Attending Veronica Hood,    Hosp Day # 5 PCP Augs Martinez MD     Subjective:  Patient seen/examined.  Clinical course reviewed.  Patient admitted with weakness, fevers, chills, and flank pain x 2-3 days.  Pancultures obtained.  S/p thoracentesis of transudative fluid.  Empiric IV zosyn ongoing for possible sepsis with unclear source.    Case d/w family and Dr. Hood.  Patient's family did recently travel to [place] with a return in January.  They had URI symptoms upon return.  Patient, himself, did not travel.  Family was negative for COVID.    Objective:  Blood pressure 102/52, pulse 76, temperature 99.1 °F (37.3 °C), temperature source Temporal, resp. rate (!) 28, height 5' 10\" (1.778 m), weight 156 lb 1.4 oz (70.8 kg), SpO2 93%.    Intake/Output:    Intake/Output Summary (Last 24 hours) at 3/2/2024 1210  Last data filed at 3/2/2024 0909  Gross per 24 hour   Intake 1401 ml   Output 2900 ml   Net -1499 ml       Physical Exam:  General: Awake, alert, non-tox, NAD.  HEENT:  Oropharynx clear, trachea ML.  Heart: RRR S1S2 no murmurs.  Lungs: Essentially CTA b/l, no rhonchi, rales, wheezes.  Abdomen: Soft, NT/ND.  BS present.  No organomegaly.  Extremity: No edema.  Neurological: No focal deficits.  Derm:  Warm, dry, free from rashes.    Lab Data Review:  Lab Results   Component Value Date    WBC 4.7 2024    HGB 11.8 2024    HCT 34.3 2024    .0 2024    CREATSERUM 1.21 2024    BUN 22 2024     2024    K 3.3 2024     2024    CO2 21.0 2024     2024    CA 8.7 2024    ALB 1.8 2024    ALKPHO 357 2024    BILT 1.4 2024    TP 5.5 2024     2024     2024    INR 1.43 2024     ESRML 10 03/02/2024    CRP 10.30 03/02/2024      Cultures:  R pleural fluid negative  L pleural fluid negative    Blood cultures negative    C.diff negative    MRSA negative    Urine cultures negative    RVP negative    Radiology:  CONCLUSION:       1. Geographic areas of cortical hypoattenuation throughout the left kidney, representing new findings compared to 11/06/2023.  Given advanced calcified atherosclerosis of the abdominal aorta and major arterial branches, findings may reflect multifocal renal cortical infarcts.  Differential includes multifocal acute pyelonephritis.  Correlate with clinical evidence of active infection.  No organized intrarenal or perinephric collections.      2. Engorgement of intrahepatic IVC and hepatic veins with somewhat heterogeneous hepatic perfusion indicating passive hepatic congestion with associated periportal edema.      3. Mild right and small left pleural effusions.     Antibiotics Reviewed:  Zosyn    Assessment and Plan:    Possible sepsis presenting with fevers, chills, flank pain and source unclear  - Sepsis w/u negative thus far with blood and urine cultures negative  - b/l thoracentesis with transudative fluid  - Possible L pyelo noted on imaging vs. Multifocal cortical infarcts  - TTE without vegetations   - PCT with mild elevation  - IV zosyn ongoing    2.  Acute hypoxic respiratory failure with b/l pleural effusions  - s/p b/l thoracentesis procedures    3.  Elevated crea, trending down    4.  Disposition - inpatient.  F/u all pending cultures and continue empiric zosyn as Rx.  Viral studies remain pending for CMV, EBV.  Source of this presentation remains largely elusive but  appears most likely with imaging findings most c/w L sided pyelonephritis vs. Other.  Duration of IV antibiotics to be determined.  Will follow.    Felicita Wheat DO, [facility] Infectious Disease  [phone]    3/2/2024  12:10 PM

## 2024-03-02 NOTE — PLAN OF CARE
Assumed care of pt at approximately 1930. Received pt resting in bed. O2 weaned as pt tolerated. Episode of rigor, -170's, HTN, increased O2 needs, prn lopressor given and cardizem titrated x2. Left thoracentesis at bedside at start of shift, fluids sent, see results. External catheter in place. Replaced electrolytes per protocol. See flowsheet/MAR. Family at bedside overnight.

## 2024-03-02 NOTE — SLP NOTE
ADULT SWALLOWING EVALUATION    ASSESSMENT    ASSESSMENT/OVERALL IMPRESSION:  Patient is an 87 y/o male admitted with fever and SOB. PMHx significant for afib, PPM, HTN, CVA, DM-2, and MALCOLM. SLP order received to evaluate oropharyngeal swallow d/t failed RN dysphagia screen. Patient received alert in bed with son present at bedside who helped with translation.     Pt recently with a decline in respiratory status s/p thoracentesis yesterday and off BIPAP.  Pt continues to be on high flow 15L.  Family reports that pt generally does not have much appetite and mostly drinks supplement drinks.  Pt evaluated with ensure type supplement drink and some rice made at home.  Pt appeared a bit weak and with decreased bolus acceptance however remains functional.  Mastication of soft rice appeared WNL with some oral residue noted post swallow that was cleared with a liquid wash.  Pt with timely swallow response and good hyolaryngeal elevation via palpation.  No s/s of aspiration noted during this limited study.  Recommend continue regular thin diet (family sticks to softer foods from home and supplement drinks) with aspiration precautions.  Will monitor times 1 meal to ensure diet tolerance.  Discussed at length with family who verbalized understanding and agreement.         RECOMMENDATIONS   Diet Recommendations - Solids: Regular  Diet Recommendations - Liquids: Thin Liquids                              Medication Administration Recommendations: One pill at a time  Treatment Plan/Recommendations: Dysphagia therapy    HISTORY   MEDICAL HISTORY  Reason for Referral: RN dysphagia screen    Problem List  Principal Problem:    Urinary tract infection without hematuria, site unspecified  Active Problems:    Dehydration    Elevated liver enzymes    Fever, unspecified fever cause    Lactic acid acidosis    Pyelonephritis    Elevated INR    Longstanding persistent atrial fibrillation (HCC)    Right upper quadrant abdominal pain    Elevated  LFTs    Chronic heart failure with preserved ejection fraction (HCC)      Past Medical History  Past Medical History:   Diagnosis Date    Arrhythmia     BRADYCARDIA VS AFIB    Disorder of thyroid     Endocrine disorder     hypothyroid    Fracture of left calcaneus 6/29/2016    Resolved last addressed  7/25/16    Hearing impairment     High blood pressure     Osteoarthrosis, unspecified whether generalized or localized, unspecified site     Stroke (HCC) July 26, 2014    TIA    Thyroid disease     Type II or unspecified type diabetes mellitus without mention of complication, not stated as uncontrolled     Unspecified essential hypertension     Unspecified sleep apnea     Visual impairment     glasses          Diet Prior to Admission: Regular;Thin liquids       Patient/Family Goals:     SWALLOWING HISTORY  Current Diet Consistency: NPO  Dysphagia History:   Patient is an 87 y/o male admitted with fever and SOB. PMHx significant for afib, PPM, HTN, CVA, DM-2, and MALCOLM. SLP order received to evaluate oropharyngeal swallow d/t failed RN dysphagia screen. Patient received alert in bed with son present at bedside who helped with translation. Patient denied history of dysphagia symptoms and reported consuming a regular diet and thin liquids at baseline.     Patient presented with an intact oropharyngeal swallow. Bolus acceptance was adequate without evidence of anterior bolus loss. Mastication and AP bolus transit were thorough and efficient without evidence of oral residue. Pharyngeal swallow initiation appeared timely and hyolaryngeal excursion was adequate per palpation.  No overt s/s of aspiration observed and patient denied odynophagia and globus sensation across consistencies.      Recommend patient initiate a regular diet and thin liquids with general safe swallowing precautions. No further SLP services warranted at this time as no deficits identified which require skilled intervention. Education provided re: results  and recommendations.  Imaging Results: CONCLUSION:    1. Decreased left pleural effusion no pneumothorax post thoracentesis.   2. Congestive heart failure and increased right perihilar edema.     SUBJECTIVE       OBJECTIVE   ORAL MOTOR EXAMINATION  Dentition: Upper dentures  Symmetry: Within Functional Limits  Strength: Within Functional Limits     Range of Motion: Within Functional Limits       Voice Quality: Clear  Respiratory Status: Nasal cannula  Consistencies Trialed: Thin liquids;Puree;Hard solid  Method of Presentation: Staff/Clinician assistance  Patient Positioning: Midline;Upright    Oral Phase of Swallow: Within Functional Limits                      Pharyngeal Phase of Swallow: Within Functional Limits           (Please note: Silent aspiration cannot be evaluated clinically. Videofluoroscopic Swallow Study is required to rule-out silent aspiration.)    Esophageal Phase of Swallow: No complaints consistent with possible esophageal involvement  Comments:               GOALS  Goal #1 The patient will tolerate regular consistency and thin liquids without overt signs or symptoms of aspiration with 95 % accuracy over 1-2 session(s).  In Progress   Goal #2 The patient/family/caregiver will demonstrate understanding and implementation of aspiration precautions and swallow strategies independently over 1-2 session(s).    In Progress   Goal #3     Goal #4     Goal #5     Goal #6     Goal #7     Goal #8       FOLLOW UP  Treatment Plan/Recommendations: Dysphagia therapy  Number of Visits to Meet Established Goals: 1  Follow Up Needed (Documentation Required): Yes  SLP Follow-up Date: 03/04/24    Thank you for your referral.   If you have any questions, please contact JACOB Jones

## 2024-03-02 NOTE — PROGRESS NOTES
[facility] Hospitalist Progress Note     CC: Hospital Follow up    PCP: Agus Martinez MD       Assessment/Plan:     Principal Problem:    Urinary tract infection without hematuria, site unspecified  Active Problems:    Dehydration    Elevated liver enzymes    Fever, unspecified fever cause    Lactic acid acidosis    Pyelonephritis    Elevated INR    Longstanding persistent atrial fibrillation (HCC)    Right upper quadrant abdominal pain    Elevated LFTs    Chronic heart failure with preserved ejection fraction (HCC)        Patient is a 88 year old male with PMH sig for hypothyroidism, type 2 diabetes, bradycardia status post pacemaker, A-fib on Xarelto who presents for evaluation of fever chills and generalized weakness. Managed for septic shock 2/2 pyelonephritis     Septic shock- resolved  Elevated lactate- resolved  Likely secondary to acute pyelonephritis, right  Fevers and chills  - Lactate elevated, repeat now clear  - Broad-spectrum antibiotics, cont IV Zosyn, dc IV vancomycin  - CT abdomen pelvis suggestive of acute pyelonephritis, UA grossly abnormal  - Blood cultures x 2 NGTD, urine cultures multiple species  - Still with fevers and rigors, and further oxygen desaturations,  - ID on consult, case discussed     Acute hypoxic respiratory failure  Pleural effusion bilaterally  - Possible aspiration pneumonitis?  - Status post thoracentesis on the right 2/29  -Status post left thoracentesis 3/1  - Pleural fluid analysis suggestive of transudative fluid, concern for heart failure  -off BiPAP, now on high flow nasal cannula  - Sputum cultures obtained  - Pulmonology following, recommendations reviewed    Hypothyroidism  - Continue levothyroxine if able to take p.o.     Type 2 diabetes with hyperglycemia  - ISS, Accu-Cheks     Arrhythmia  Bradycardia status post pacemaker  A-fib with RVR  - Xarelto held previously now resumed   - Started on diltiazem drip, wean as tolerated  - Diltiazem and metoprolol to continue with  parameters  - Cardiology on consult, appreciate recommendations     Suspect acute on chronic diastolic heart failure  - proBNP elevated  - IV Lasix  - 2D echo- reviewed  - Telemetry     Dysphagia  - History of esophageal stricture that was dilated few months ago  - Recently treated with esophageal candidiasis  - Still unable to tolerate solids  - Speech evaluation-passed  - GI on consult-family deferring EGD at this time.  Will focus on acute issues for now    Elevated liver enzymes  - Possibly from shock?  - Ultrasound liver- with small ascites  - trend liver enzymes,  - Workup per GI, recommendations reviewed-GI now signed off    Protein calorie malnutrition, moderate to severe  - Poor caloric intake  - Patient/family not willing to consider tube feeds at this time  - Dietitian on consult for calorie count     Advance care planning  - POA: Son  - CODE STATUS: DNR/select-discussed with patient and his family at bedside and on presentation        FN:  - IVF: hold  - Diet: swallow eval     DVT Prophy: SCDs, xarelto-to be resumed once thoracentesis complete  Atrophy: PT/OT  Lines: PIV     Dispo:  icu  Outpatient records or previous hospital records reviewed.      Further recommendations pending patient's clinical course.  [facility] [place] in [place]. Discussed with son at bedside     Patient and/or patient's family given opportunity to ask questions and note understanding and agreeing with therapeutic plan as outlined        Thank You,  DO [facility] Hospitalist  Answering Service number: [phone]     Subjective:      Still persistent with intermittent fevers and rigors that lead to A-fib RVR episodes.  Patient denies any symptoms.  Family at bedside.  States that patient's daughter came from [place] in mid January and was sick for a long period of time.  Patient also has been having rigors since about the same time.    OBJECTIVE:    Blood pressure 103/49, pulse 79,  temperature 98 °F (36.7 °C), temperature source Temporal, resp. rate (!) 30, height 5' 10\" (1.778 m), weight 156 lb 1.4 oz (70.8 kg), SpO2 97%.    Temp:  [98 °F (36.7 °C)-100.1 °F (37.8 °C)] 98 °F (36.7 °C)  Pulse:  [] 79  Resp:  [20-38] 30  BP: ()/() 103/49  SpO2:  [85 %-100 %] 97 %      Intake/Output:    Intake/Output Summary (Last 24 hours) at 3/2/2024 1419  Last data filed at 3/2/2024 1200  Gross per 24 hour   Intake 1521 ml   Output 2900 ml   Net -1379 ml       Last 3 Weights   03/02/24 0500 156 lb 1.4 oz (70.8 kg)   03/01/24 0030 161 lb 2.5 oz (73.1 kg)   02/29/24 0200 164 lb 10.9 oz (74.7 kg)   02/28/24 0500 163 lb 2.3 oz (74 kg)   02/27/24 0313 157 lb 10.1 oz (71.5 kg)   02/26/24 2043 168 lb (76.2 kg)   02/26/24 1151 160 lb (72.6 kg)   11/06/23 2257 144 lb 3.2 oz (65.4 kg)   11/06/23 1645 160 lb (72.6 kg)   04/30/23 0537 179 lb 7.3 oz (81.4 kg)   04/29/23 0640 182 lb 12.2 oz (82.9 kg)   04/28/23 0810 161 lb 9.6 oz (73.3 kg)   04/26/23 0805 180 lb 12.4 oz (82 kg)       Exam   Gen: No acute distress, alert and oriented x3, no focal neurologic deficits, ill appearing  Heent: NC AT, mucous memb clear, neck supple  Pulm: Supplemental oxygen, crackles at the bases bilaterally, requiring high flow nasal cannula  CV: Heart with regular rate and rhythm, no peripheral edema  Abd: Abdomen soft, nontender, nondistended, no organomegaly, bowel sounds present  MSK: moving all extremities  Skin: no rashes or lesions, some bruises noted from blood draws  Neuro: AO2-3, motor intact, no sensory deficits, answers questions appropriately,  Psyc: appropriate mood and affect      Data Review:       Labs:     Recent Labs   Lab 02/28/24  0657 02/29/24  0356 03/01/24  0540 03/02/24  0435   RBC 4.97 4.69 4.96 5.05   HGB 11.6* 10.8* 11.7* 11.8*   HCT 35.2* 33.5* 34.3* 34.3*   MCV 70.8* 71.4* 69.2* 67.9*   MCH 23.3* 23.0* 23.6* 23.4*   MCHC 33.0 32.2 34.1 34.4   RDW 17.2 16.9 17.2 17.5   NEPRELIM 6.18 4.31 4.04  --     WBC 6.8 5.2 5.0 4.7   .0 234.0 200.0 170.0         Recent Labs   Lab 02/29/24  0356 02/29/24  1406 03/01/24  0540 03/02/24  0435   *  --  212* 160*   BUN 27*  --  24* 22   CREATSERUM 1.33*  --  1.22 1.21   EGFRCR 52*  --  58* 58*   CA 8.2*  --  8.8 8.7   *  --  133* 132*   K 3.4*  3.4* 4.3 4.0 3.3*     --  106 104   CO2 20.0*  --  19.0* 21.0       Recent Labs   Lab 02/27/24  2046 02/28/24  0657 02/29/24  0356 03/01/24  0540 03/02/24  0435   * 313* 315* 255* 190*   * 412* 323* 198* 132*   ALB 1.8* 2.0* 1.9* 1.9* 1.8*   LDH  --   --  260*  --   --          Imaging:  US THORACENTESIS GUIDED LEFT (CPT=32555)    Result Date: 3/1/2024  CONCLUSION:  Ultrasound-guided left thoracentesis was performed without complication.   LOCATION:  [facility]    Dictated by (CST): Shorty Herrera MD on 3/01/2024 at 9:04 PM     Finalized by (CST): Shorty Herrera MD on 3/01/2024 at 9:07 PM       XR CHEST AP PORTABLE  (CPT=71045)    Result Date: 3/1/2024  CONCLUSION:  1. Decreased left pleural effusion no pneumothorax post thoracentesis. 2. Congestive heart failure and increased right perihilar edema.   LOCATION:  [id number]      Dictated by (CST): Brent Duran MD on 3/01/2024 at 8:55 PM     Finalized by (CST): Brent Duran MD on 3/01/2024 at 9:00 PM       US KIDNEY/BLADDER (CPT=76770)    Result Date: 2/29/2024  CONCLUSION:  1. There is protrusion of median lobe of prostate into bladder lumen which is most likely a large BPH nodule. 2. Kidneys are otherwise sonographically unremarkable.    LOCATION:  [facility]     Dictated by (CST): Agus eFrguson MD on 2/29/2024 at 2:50 PM     Finalized by (CST): Agus Ferguson MD on 2/29/2024 at 2:51 PM       XR CHEST AP PORTABLE  (CPT=71045)    Result Date: 2/29/2024  CONCLUSION:  1. Status post right-sided thoracentesis with resolution of right effusion. 2. No postprocedural pneumothorax or acute process. 3. Stable pleural parenchymal changes within the left lung  base. 4. Stable pulmonary vascular congestion and pulmonary edema.   LOCATION:  [facility]      Dictated by (CST): Foster Mehta DO on 2/29/2024 at 1:17 PM     Finalized by (CST): Foster Mehta DO on 2/29/2024 at 1:18 PM       CT CHEST (BNH=66328)    Result Date: 2/29/2024  CONCLUSION:  Large right and moderate size left pleural effusion, including some loculation of the effusion on the left with atelectasis and lung infiltrates.  No sign of pneumothorax.  Thickening of the esophagus may reflect esophagitis.  Reactive lymph  nodes are present.  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  LOCATION:  [facility]   Dictated by (CST): Drake David MD on 2/29/2024 at 11:00 AM     Finalized by (CST): Drake David MD on 2/29/2024 at 11:08 AM          Meds:      furosemide  20 mg Intravenous BID (Diuretic)    insulin degludec  5 Units Subcutaneous Daily    rivaroxaban  15 mg Oral Daily with food    multivitamin with minerals  1 tablet Oral Daily    insulin aspart  1-5 Units Subcutaneous TID AC and HS    [Held by provider] atorvastatin  40 mg Oral Nightly    [Held by provider] dilTIAZem HCl ER  180 mg Oral Daily    levothyroxine  25 mcg Oral Before breakfast    piperacillin-tazobactam  3.375 g Intravenous Q8H      dilTIAZem 5 mg/hr (03/02/24 1136)     metoprolol, dilTIAZem, ipratropium-albuterol, acetaminophen, acetaminophen **OR** acetaminophen **OR** acetaminophen, glucose **OR** glucose **OR** glucose-vitamin C **OR** dextrose **OR** glucose **OR** glucose **OR** glucose-vitamin C, polyethylene glycol (PEG 3350), sennosides, bisacodyl, fleet enema, ondansetron, metoclopramide

## 2024-03-02 NOTE — PROGRESS NOTES
[facility]  Progress Note    Mahsa Zhang Patient Status:  Inpatient    1935 MRN [id number]   Location [facility] 4SW-A Attending Veronica Hood,    Hosp Day # 5 PCP Agus Martinez MD     Subjective:   - s/p thoracentesis yesterday. Reports that his breathing is better. Denies any chest pain, or palpitations.   On high flow 15L O2.   Spiked a fever again this monring.   Hr elevated to 120-130s while on maximum of dilt gtt.     Objective:  Vitals:    24 0629 24 0700 24 0730 24 0838   BP: 154/82 (!) 165/84 159/86    BP Location:       Pulse: 110 (!) 124 120    Resp: 26 (!) 27 (!) 29    Temp:    100.1 °F (37.8 °C)   TempSrc:    Temporal   SpO2: (!) 87% 92% 92%    Weight:       Height:           Temp (24hrs), Av.9 °F (37.2 °C), Min:98.1 °F (36.7 °C), Max:100.1 °F (37.8 °C)      Medications:   Scheduled:    potassium chloride  40 mEq Oral Q4H    [Held by provider] rivaroxaban  15 mg Oral Daily with food    multivitamin with minerals  1 tablet Oral Daily    insulin aspart  1-5 Units Subcutaneous TID AC and HS    [Held by provider] atorvastatin  40 mg Oral Nightly    [Held by provider] dilTIAZem HCl ER  180 mg Oral Daily    levothyroxine  25 mcg Oral Before breakfast    piperacillin-tazobactam  3.375 g Intravenous Q8H       Continuous Infusion:    dilTIAZem 20 mg/hr (24 0756)       PRN Medications:   metoprolol, dilTIAZem, ipratropium-albuterol, acetaminophen, acetaminophen **OR** acetaminophen **OR** acetaminophen, glucose **OR** glucose **OR** glucose-vitamin C **OR** dextrose **OR** glucose **OR** glucose **OR** glucose-vitamin C, polyethylene glycol (PEG 3350), sennosides, bisacodyl, fleet enema, ondansetron, metoclopramide    Intake/Output:     Intake/Output Summary (Last 24 hours) at 3/2/2024 0855  Last data filed at 3/2/2024 0628  Gross per 24 hour   Intake 1041 ml   Output 3000 ml   Net -1959 ml       Wt Readings from Last 3 Encounters:   24 156 lb 1.4 oz  (70.8 kg)   11/06/23 144 lb 3.2 oz (65.4 kg)   04/30/23 179 lb 7.3 oz (81.4 kg)       Allergies:  No Known Allergies    Physical Exam:   General:  Well-developed / Well-nourished.  No acute distress.  HEENT:  Normocephalic.  Atraumatic.  No icterus.  Neck:  There is no jugular venous distention.   Cardiovascular:  Cardiovascular examination demonstrates an irregular rate and rhythm.  There is normal S1, S2.  There is no S3 or S4.  There are no murmurs, rubs, or gallops.  No click is appreciated.  PMI is nondisplaced with a normal apical impulse.    Pulmonary:  Lungs are coarse on auscultation bilaterally.  There are no focal rales, rhonchi, or wheezes.  Good air movement is noted throughout both lung fields.   Abdomen:  The abdomen is soft, non-distended, and non-tender.  Bowel sounds are present and normoactive.  No organomegaly is appreciated.  Extremities:  Extremities do not demonstrate any evidence of peripheral edema.   No cyanosis or clubbing of the digits is appreciated.  Neurologic:  Alert and oriented.  Normal affect.  Integument:  No visible rashes are appreciated.      Laboratory/Data:   Recent Labs   Lab 02/29/24  0356 02/29/24  1406 03/01/24  0540 03/02/24  0435   *  --  212* 160*   BUN 27*  --  24* 22   CREATSERUM 1.33*  --  1.22 1.21   CA 8.2*  --  8.8 8.7   ALB 1.9*  --  1.9* 1.8*   *  --  133* 132*   K 3.4*  3.4* 4.3 4.0 3.3*     --  106 104   CO2 20.0*  --  19.0* 21.0   ALKPHO 249*  --  364* 357*   *  --  198* 132*   *  --  255* 190*   BILT 1.2  --  1.3 1.4   TP 5.7*  --  5.8* 5.5*       Recent Labs   Lab 02/28/24  0657 02/29/24  0356 03/01/24  0540 03/02/24  0435   RBC 4.97 4.69 4.96 5.05   HGB 11.6* 10.8* 11.7* 11.8*   HCT 35.2* 33.5* 34.3* 34.3*   MCV 70.8* 71.4* 69.2* 67.9*   MCH 23.3* 23.0* 23.6* 23.4*   MCHC 33.0 32.2 34.1 34.4   RDW 17.2 16.9 17.2 17.5   NEPRELIM 6.18 4.31 4.04  --    WBC 6.8 5.2 5.0 4.7   .0 234.0 200.0 170.0       Recent Labs   Lab  02/29/24  0356 03/01/24  0540 03/02/24  0435   PTP 22.6* 18.2* 17.5*   INR 1.97* 1.49* 1.43*       No results for input(s): \"TROP\", \"CK\" in the last 168 hours.      Tele: AF - rate elevated with fever    Diagnostics:    Echo:   Conclusions:     1. Left ventricle: The cavity size was normal. Wall thickness was at the      upper limits of normal. Systolic function was normal. The estimated      ejection fraction was 50-55%, by visual assessment. No diagnostic      evidence for regional wall motion abnormalities. Unable to assess LV      diastolic function due to heart rhythm.   2. Right ventricle: The cavity size was increased. Pacer wire noted in the      right ventricle. The RV pressure during systole is 56mm Hg.   3. Left atrium: The left atrial volume was mildly increased.   4. Aortic valve: There was mild regurgitation.   5. Tricuspid valve: There was moderate regurgitation.   6. Ascending aorta: The ascending aorta was 3.9cm diameter.       Assessment:     Atrial Fibrillation  -Rate controlled on IV CCB  -Xarelto held for further thoracentesis  2.     SSS                -S/P PPM  3.    Sepsis                -Pyelonephritis                -PNA  4.    HTN Hx  5.    Hypothyroidism  6.    DM  7.    Anemia  8.    Coagulopathy  9.    Elevated LFTs  10.  NL EF  11.  Pulmonary HTN  12.  Pleural effusion                -S/P Thora 2/29/24  13.  HFpEF  14.  Respiratory insufficiency   -Component of CHF   -ARDS suspected   -On BiPAP    Plan:    Continue dilitazem gtt -- currently at 20mg/hr   IV BB PRN if rates inadequately controlled on IV CCB with fevers   S/p thoracentesis 3/1/24 -- pt reports improvement in his breathing   IV Lasix 40mg today   On 15 L high flow O2   Consider ID assessment   Resume anticoagulation once there are no further procedures planned.    Puma Grant DO  Cardiologist, [facility]

## 2024-03-02 NOTE — PLAN OF CARE
Received pt this am on 6L nc.     Needing bipap late this am for a few hours. On cardizem gtt.     Family at bedside throughout the day.     Plan for thora this evening.       Problem: METABOLIC/FLUID AND ELECTROLYTES - ADULT  Goal: Electrolytes maintained within normal limits  Description: INTERVENTIONS:  - Monitor labs and rhythm and assess patient for signs and symptoms of electrolyte imbalances  - Administer electrolyte replacement as ordered  - Monitor response to electrolyte replacements, including rhythm and repeat lab results as appropriate  - Fluid restriction as ordered  - Instruct patient on fluid and nutrition restrictions as appropriate  Outcome: Not Progressing

## 2024-03-02 NOTE — PROCEDURES
[facility]    Mahsa Zhang Patient Status:  Inpatient    1935 MRN [id number]   Location [facility] 4SW-A Attending Veronica Hood,    Hosp Day # 4 PCP Agus Martinez MD         Procedure Report    Pre-Operative Diagnosis: Symptomatic Left Pleural Effusion    Post-Operative Diagnosis: Same as above.    Procedure Performed: Ultrasound guided left thoracentesis    Anesthesia: 1% lidocaine    EBL: 0    Complications: None    Summary of Case: 1000cc of yellow colored fluid aspirated from the left pleural space.  Fluid was sent for routine studies as directed by the ordering physician. Patient tolerated procedure well without immediate complication.  Post thoracentesis chest x-ray to be obtained.  Full report to follow in PACS.    Shorty Herrera

## 2024-03-02 NOTE — PROGRESS NOTES
[facility]    Mahsa Zhang Patient Status:  Inpatient    1935 MRN [id number]   formerly [facility] 4SW-A Attending Veronica Hood,    Hosp Day # 5 PCP Agus Martinez MD     Critical Care Progress Note     Date of Admission: 2024 12:34 PM  Admission Diagnosis: Dehydration [E86.0]  Lactic acid acidosis [E87.20]  Elevated liver enzymes [R74.8]  Fever, unspecified fever cause [R50.9]  Urinary tract infection without hematuria, site unspecified [N39.0]     S: underwent thoracentesis yesterday; removed 1L.  This am, had fever to 100.6 followed by desaturations to 15L HFNC      Current Medications:    Current Facility-Administered Medications:     potassium chloride (Klor-Con) 20 MEQ oral powder 40 mEq, 40 mEq, Oral, Q4H    [Held by provider] rivaroxaban (Xarelto) tab 15 mg, 15 mg, Oral, Daily with food    metoprolol (Lopressor) 5 mg/5mL injection 5 mg, 5 mg, Intravenous, Q6H PRN    dilTIAZem (cardIZEM) 25 mg/5mL injection 10 mg, 10 mg, Intravenous, Q6H PRN    [COMPLETED] dilTIAZem 10 mg BOLUS FROM BAG infusion, 10 mg, Intravenous, Once **AND** dilTIAZem (cardIZEM) 100 mg in sodium chloride 0.9% 100 mL IVPB-ADDV, 2.5-20 mg/hr, Intravenous, Continuous    multivitamin with minerals (Thera M Plus) tab 1 tablet, 1 tablet, Oral, Daily    ipratropium-albuterol (Duoneb) 0.5-2.5 (3) MG/3ML inhalation solution 3 mL, 3 mL, Nebulization, Q6H PRN    acetaminophen (Ofirmev) 10 mg/mL infusion premix 1,000 mg, 1,000 mg, Intravenous, Q6H PRN    acetaminophen (Tylenol) tab 650 mg, 650 mg, Oral, Q4H PRN **OR** acetaminophen (Tylenol) 160 MG/5ML oral liquid 650 mg, 650 mg, Oral, Q4H PRN **OR** acetaminophen (Tylenol) rectal suppository 650 mg, 650 mg, Rectal, Q4H PRN    glucose (Dex4) 15 GM/59ML oral liquid 15 g, 15 g, Oral, Q15 Min PRN **OR** glucose (Glutose) 40% oral gel 15 g, 15 g, Oral, Q15 Min PRN **OR** glucose-vitamin C (Dex-4) chewable tab 4 tablet, 4 tablet, Oral, Q15 Min PRN **OR** dextrose 50% injection  50 mL, 50 mL, Intravenous, Q15 Min PRN **OR** glucose (Dex4) 15 GM/59ML oral liquid 30 g, 30 g, Oral, Q15 Min PRN **OR** glucose (Glutose) 40% oral gel 30 g, 30 g, Oral, Q15 Min PRN **OR** glucose-vitamin C (Dex-4) chewable tab 8 tablet, 8 tablet, Oral, Q15 Min PRN    insulin aspart (NovoLOG) 100 Units/mL FlexPen 1-5 Units, 1-5 Units, Subcutaneous, TID AC and HS    polyethylene glycol (PEG 3350) (Miralax) 17 g oral packet 17 g, 17 g, Oral, Daily PRN    sennosides (Senokot) tab 17.2 mg, 17.2 mg, Oral, Nightly PRN    bisacodyl (Dulcolax) 10 MG rectal suppository 10 mg, 10 mg, Rectal, Daily PRN    fleet enema (Fleet) 7-19 GM/118ML rectal enema 133 mL, 1 enema, Rectal, Once PRN    ondansetron (Zofran) 4 MG/2ML injection 4 mg, 4 mg, Intravenous, Q6H PRN    metoclopramide (Reglan) 5 mg/mL injection 10 mg, 10 mg, Intravenous, Q8H PRN    [Held by provider] atorvastatin (Lipitor) tab 40 mg, 40 mg, Oral, Nightly    [Held by provider] dilTIAZem ER (CardIZEM CD) 24 hr cap 180 mg, 180 mg, Oral, Daily    levothyroxine (Synthroid) tab 25 mcg, 25 mcg, Oral, Before breakfast    piperacillin-tazobactam (Zosyn) 3.375 g in dextrose 5% 100 mL IVPB-ADDV, 3.375 g, Intravenous, Q8H     OBJECTIVE:  /87   Pulse (!) 132   Temp 100.1 °F (37.8 °C) (Temporal)   Resp (!) 35   Ht 177.8 cm (5' 10\")   Wt 156 lb 1.4 oz (70.8 kg)   SpO2 93%   BMI 22.40 kg/m²      15L HFNC      Wt Readings from Last 3 Encounters:   03/02/24 156 lb 1.4 oz (70.8 kg)   11/06/23 144 lb 3.2 oz (65.4 kg)   04/30/23 179 lb 7.3 oz (81.4 kg)        I/O last 3 completed shifts:  In: 1711 [P.O.:720; I.V.:291; IV PIGGYBACK:700]  Out: 4075 [Urine:3075; Drains:1000]  I/O this shift:  In: 360 [P.O.:360]  Out: 350 [Urine:350]     General appearance: alert, appears stated age, cooperative, and no distress  Lungs: diminished breath sounds bilaterally  Heart: regular rate and rhythm  Abdomen: soft, non-tender; bowel sounds normal; no masses,  no organomegaly  Extremities:  extremities normal, atraumatic, no cyanosis or edema     Lab Results   Component Value Date    WBC 4.7 03/02/2024    RBC 5.05 03/02/2024    HGB 11.8 03/02/2024    HCT 34.3 03/02/2024    MCV 67.9 03/02/2024    MCH 23.4 03/02/2024    MCHC 34.4 03/02/2024    RDW 17.5 03/02/2024    .0 03/02/2024     Lab Results   Component Value Date     03/02/2024    K 3.3 03/02/2024     03/02/2024    CO2 21.0 03/02/2024    BUN 22 03/02/2024    CREATSERUM 1.21 03/02/2024     03/02/2024    CA 8.7 03/02/2024    ALKPHO 357 03/02/2024     03/02/2024     03/02/2024    BILT 1.4 03/02/2024    ALB 1.8 03/02/2024    TP 5.5 03/02/2024     Lab Results   Component Value Date    INR 1.43 (H) 03/02/2024    INR 1.49 (H) 03/01/2024    INR 1.97 (H) 02/29/2024           Imaging: CXR: personally reviewed. Pulm edema and congestion.  Effusion resolved after thora     ASSESSMENT/PLAN:  Sepsis due to pyelonephritis and PNA- BP improved  -LA-normalized  -avoid additional IVF boluses at this point  -MRSA negative, stopped vanco; continue zosyn   -chest CT showed consolidations and pleural effusions.  -still having intermittent fevers- ID consulted, w/u unrevaling thus far  Acute hypoxic resp failure- due to PNA/CHF/sepsis/effusions  - desaturated in midst of fevers and required initiation of NIPPV; overall better after thora  - wean O2 as tolerated  -remains off NIPPV  Pleural effusions- bilateral  - s/p R thoracentesis on 2/28 with 1.3L of transudative fluid c/w CHF  - s/p L thoracentesis on 3/1/24 with 1.2L of transudative fluid removed, cw CHF  - needs ongoing diuresis bc of risk for recurrence  - given recurring fevers w/o obvious signs of new infection, will check inflammatory markers  Abdominal pain RUQ  -Elevated LFTs  -Abdominal US with mild GB thickening but no stones or torres's sign   - GI consulted; w/u ongoing  Coagulopathy- with elevated INR  -Had been holding Xarelto, but can restart after thoracentesis    -no signs of bleeding  -INR daily  HFpEF, HTN, HL, AFib  -Cardizem gtt. BB for better control  -Cardiology following  -diuresis as tolerated  F/E/N:  minimize IV fluids, follow lytes and replete prn, ADAT   Proph: Xarelto held due to elevated INR initially and now for thora on L, SCD  Dispo: [facility]/Selective Treatment  - critically ill; cont with ICU care  - crit care time: 35 min  - d/w family    Critical Care Time: 35 minutes    Faustino Palacios MD  3/2/2024  9:38 AM

## 2024-03-02 NOTE — PHYSICAL THERAPY NOTE
PHYSICAL THERAPY TREATMENT NOTE - INPATIENT    Room Number: 455/455-A     Session: 1     Number of Visits to Meet Established Goals: 3    Presenting Problem: UTI  Co-Morbidities : h/o HTN, TIA, DM2    ASSESSMENT   Patient demonstrates good  progress this session, goals  remain in progress.    Patient continues to function below baseline with bed mobility, transfers, and gait.  Contributing factors to remaining limitations include decreased functional strength and decreased endurance/aerobic capacity.  Next session anticipate patient to progress bed mobility, transfers, and gait.  Physical Therapy will continue to follow patient for duration of hospitalization.    Patient continues to benefit from continued skilled PT services: at discharge to promote functional independence and safety with additional support and return home with home health PT.    PLAN  PT Treatment Plan: Bed mobility;Body mechanics;Endurance;Energy conservation;Patient education;Family education;Gait training;Range of motion;Stoop training;Stair training;Transfer training;Balance training  Rehab Potential : Good  Frequency (Obs):  (2-3x/week)    CURRENT GOALS     Goal #1 Patient is able to demonstrate supine - sit EOB @ level: supervision      Goal #2 Patient is able to demonstrate transfers Sit to/from Stand at assistance level: supervision      Goal #3 Patient is able to ambulate 50 feet with assist device: walker - rolling at assistance level: supervision      Goal #4     Goal #5     Goal #6     Goal Comments: Goals established on 2024, ongoing 3/2/24.    SUBJECTIVE  \"I want to go home.\"      OBJECTIVE  Precautions: Bed/chair alarm; needed    WEIGHT BEARING RESTRICTION  Weight Bearing Restriction: None                PAIN ASSESSMENT   Ratin          BALANCE                                                                                                                       Static Sitting: Good  Dynamic Sitting:  Good           Static Standing: Fair -  Dynamic Standing: Fair -    ACTIVITY TOLERANCE                         O2 WALK         AM-PAC '6-Clicks' INPATIENT SHORT FORM - BASIC MOBILITY  How much difficulty does the patient currently have...  Patient Difficulty: Turning over in bed (including adjusting bedclothes, sheets and blankets)?: A Little   Patient Difficulty: Sitting down on and standing up from a chair with arms (e.g., wheelchair, bedside commode, etc.): A Little   Patient Difficulty: Moving from lying on back to sitting on the side of the bed?: A Little   How much help from another person does the patient currently need...   Help from Another: Moving to and from a bed to a chair (including a wheelchair)?: A Little   Help from Another: Need to walk in hospital room?: A Little   Help from Another: Climbing 3-5 steps with a railing?: A Little       AM-PAC Score:  Raw Score: 18   Approx Degree of Impairment: 46.58%   Standardized Score (AM-PAC Scale): 43.63   CMS Modifier (G-Code): CK    FUNCTIONAL ABILITY STATUS  Gait Assessment   Functional Mobility/Gait Assessment  Gait Assistance: Contact guard assist  Distance (ft): 2 (towards HOB only)  Assistive Device: Rolling walker  Pattern: Shuffle    Skilled Therapy Provided    Bed Mobility:  Rolling: min A  Supine<>Sit: min A for legs and trunk;  increased time sitting eob.  Noted multiple minor LOB post.    Sit<>Supine: min A for legs     Transfer Mobility:  Sit<>Stand: min A  Stand<>Sit: CGA   Gait: Ambulation as above towards HOB only as pt requesting to return to bed to have BM.    Therapist's Comments: RN aware pt pleasantly declines use of commode, wanting to get up to chair after BM.    HR stable 90-96 bpm throughout session, O2 sats at 94% and above.      Patient End of Session: In bed;Needs met;Call light within reach;RN aware of session/findings;All patient questions and concerns addressed;Alarm set;Discussed recommendations with /social  worker    PT Session Time: 23 minutes    Therapeutic Activity: 23 minutes

## 2024-03-02 NOTE — PROCEDURES
[facility]  Pre-Procedure Note    Name: Mahsa Zhang  MRN#: [id number]  : 1935    Procedure:  Ultrasound Guided Left Thoracentesis    Indication:  Symptomatic Left Pleural Effusion.  Pneumonia    Allergies:    No Known Allergies    Pertinent Medications:    Is patient on any Aspirin, Coumadin, or any other Anticoagulations/Antiplatelet medications?  no      Mental Status:  Alert and Oriented      Health Status: Acceptable for Procedure    Impression and Plans:    Symptomatic left pleural effusion for ultrasound guided thoracentesis. Pulmonologist request IR thoracentesis.    I have reviewed the above information prior to procedure.    I have discussed the risks and benefits and alternatives with the patient.  The patient understands and agrees to proceed with plan of care.    Shorty Herrera MD

## 2024-03-02 NOTE — PLAN OF CARE
Pt alert and oriented x4. Speaks rose marie but family translates. O2 weaned as able, denies SOB. Lasix added. Cardizem gtt titrated as able, see MAR, per cards keep on low dose even with HR in 60s d/t pt cyclicly having fevers and increased HR/HTN episodes. SLP saw, ok for regular diet with thin liquids, poor appetite, calorie count. Per son, no Dobhoff for nutrition at this time. Up to chair with 2 assist. Family at bedside and updated on POC.

## 2024-03-03 NOTE — PROGRESS NOTES
[facility] Hospitalist Progress Note     CC: Hospital Follow up    PCP: Agus Martinez MD       Assessment/Plan:     Principal Problem:    Urinary tract infection without hematuria, site unspecified  Active Problems:    Dehydration    Elevated liver enzymes    Fever, unspecified fever cause    Lactic acid acidosis    Pyelonephritis    Elevated INR    Longstanding persistent atrial fibrillation (HCC)    Right upper quadrant abdominal pain    Elevated LFTs    Chronic heart failure with preserved ejection fraction (HCC)        Patient is a 88 year old male with PMH sig for hypothyroidism, type 2 diabetes, bradycardia status post pacemaker, A-fib on Xarelto who presents for evaluation of fever chills and generalized weakness. Managed for septic shock 2/2 pyelonephritis     Septic shock- resolved  Elevated lactate- resolved  Likely secondary to acute pyelonephritis, right  Fevers and chills  - Lactate elevated, repeat now clear  - Broad-spectrum antibiotics, cont IV Zosyn, dc IV vancomycin  - CT abdomen pelvis suggestive of acute pyelonephritis, UA grossly abnormal  - Blood cultures x 2 NGTD, urine cultures multiple species  - Still with fevers and rigors, and further oxygen desaturations,  -Repeat UA, patient symptomatic still despite long course of IV Zosyn  - ID on consult, case discussed     Acute hypoxic respiratory failure  Pleural effusion bilaterally  - Possible aspiration pneumonitis?  - Status post thoracentesis on the right 2/29  -Status post left thoracentesis 3/1  - Pleural fluid analysis suggestive of transudative fluid, concern for heart failure  -off BiPAP, now on high flow nasal cannula  - Sputum cultures obtained  - Pulmonology following, recommendations reviewed    Hypothyroidism  - Continue levothyroxine if able to take p.o.     Type 2 diabetes with hyperglycemia  - ISS, Accu-Cheks     Arrhythmia  Bradycardia status post pacemaker  A-fib with RVR  - Xarelto held previously now resumed   - Started on  diltiazem drip, wean as tolerated  - Diltiazem and metoprolol to continue with parameters  - Cardiology on consult, appreciate recommendations     Suspect acute on chronic diastolic heart failure  - proBNP elevated  - IV Lasix per cardiology  - 2D echo- reviewed  - Telemetry     Dysphagia  - History of esophageal stricture that was dilated few months ago  - Recently treated with esophageal candidiasis  - Still unable to tolerate solids  - Speech evaluation-passed  - GI on consult-family deferring EGD at this time.  Will focus on acute issues for now    Elevated liver enzymes-improving  - Possibly from shock?  - Ultrasound liver- with small ascites  - trend liver enzymes,  - Workup per GI, recommendations reviewed-GI now signed off    Protein calorie malnutrition, moderate to severe  - Poor caloric intake  - Patient/family not willing to consider tube feeds at this time  - Dietitian on consult for calorie count     Advance care planning  - POA: Son  - CODE STATUS: DNR/select-discussed with patient and his family at bedside and on presentation        FN:  - IVF: hold  - Diet: swallow eval     DVT Prophy: SCDs, xarelto    Atrophy: PT/OT  Lines: PIV     Dispo:  icu  Outpatient records or previous hospital records reviewed.      Further recommendations pending patient's clinical course.  [facility] hospitalist to continue to follow patient while in house. Discussed with son at bedside     Patient and/or patient's family given opportunity to ask questions and note understanding and agreeing with therapeutic plan as outlined        Thank You,  DO William Chino Hospitalist  Answering Service number: [phone]     Subjective:      Seen and examined at bedside.  Patient expresses some pain and burning with urination.  Again having fevers, chills in the evening that is accompanied by elevated heart rate/A-fib RVR as well as desaturations requiring BiPAP.  After placement of the BiPAP and Tylenol administration, patient  goes back to his baseline.    OBJECTIVE:    Blood pressure 117/54, pulse 75, temperature 96.8 °F (36 °C), temperature source Temporal, resp. rate (!) 27, height 5' 10\" (1.778 m), weight 159 lb 13.3 oz (72.5 kg), SpO2 92%.    Temp:  [96.8 °F (36 °C)-100.1 °F (37.8 °C)] 96.8 °F (36 °C)  Pulse:  [] 75  Resp:  [23-42] 27  BP: ()/(45-98) 117/54  SpO2:  [76 %-100 %] 92 %  FiO2 (%):  [50 %-100 %] 50 %      Intake/Output:    Intake/Output Summary (Last 24 hours) at 3/3/2024 1340  Last data filed at 3/3/2024 1133  Gross per 24 hour   Intake 1235.6 ml   Output 1650 ml   Net -414.4 ml       Last 3 Weights   03/03/24 0000 159 lb 13.3 oz (72.5 kg)   03/02/24 0500 156 lb 1.4 oz (70.8 kg)   03/01/24 0030 161 lb 2.5 oz (73.1 kg)   02/29/24 0200 164 lb 10.9 oz (74.7 kg)   02/28/24 0500 163 lb 2.3 oz (74 kg)   02/27/24 0313 157 lb 10.1 oz (71.5 kg)   02/26/24 2043 168 lb (76.2 kg)   02/26/24 1151 160 lb (72.6 kg)   11/06/23 2257 144 lb 3.2 oz (65.4 kg)   11/06/23 1645 160 lb (72.6 kg)   04/30/23 0537 179 lb 7.3 oz (81.4 kg)   04/29/23 0640 182 lb 12.2 oz (82.9 kg)   04/28/23 0810 161 lb 9.6 oz (73.3 kg)   04/26/23 0805 180 lb 12.4 oz (82 kg)       Exam   Gen: No acute distress, alert and oriented x3, no focal neurologic deficits, ill appearing  Heent: NC AT, mucous memb clear, neck supple  Pulm: Supplemental oxygen, crackles at the bases bilaterally, requiring high flow nasal cannula  CV: Heart with regular rate and rhythm, no peripheral edema  Abd: Abdomen soft, nontender, nondistended, no organomegaly, bowel sounds present  MSK: moving all extremities  Skin: no rashes or lesions, some bruises noted from blood draws  Neuro: AO2-3, motor intact, no sensory deficits, answers questions appropriately,  Psyc: appropriate mood and affect      Data Review:       Labs:     Recent Labs   Lab 02/29/24  0356 03/01/24  0540 03/02/24  0435 03/03/24  0431   RBC 4.69 4.96 5.05 4.74   HGB 10.8* 11.7* 11.8* 10.7*   HCT 33.5* 34.3* 34.3*  32.7*   MCV 71.4* 69.2* 67.9* 69.0*   MCH 23.0* 23.6* 23.4* 22.6*   MCHC 32.2 34.1 34.4 32.7   RDW 16.9 17.2 17.5 17.0   NEPRELIM 4.31 4.04  --  4.75   WBC 5.2 5.0 4.7 5.9   .0 200.0 170.0 188.0         Recent Labs   Lab 03/01/24  0540 03/02/24  0435 03/02/24  1406 03/03/24  0431   * 160*  --  165*   BUN 24* 22  --  28*   CREATSERUM 1.22 1.21  --  1.34*   EGFRCR 58* 58*  --  52*   CA 8.8 8.7  --  9.0   * 132*  --  131*   K 4.0 3.3* 3.9 4.0    104  --  102   CO2 19.0* 21.0  --  22.0       Recent Labs   Lab 02/28/24  0657 02/29/24  0356 03/01/24  0540 03/02/24  0435 03/03/24  0431   * 315* 255* 190* 137*   * 323* 198* 132* 98*   ALB 2.0* 1.9* 1.9* 1.8* 1.6*   LDH  --  260*  --   --   --          Imaging:  XR CHEST AP PORTABLE  (CPT=71045)    Result Date: 3/3/2024  CONCLUSION:  See above.   LOCATION:  [facility]      Dictated by (CST): Stromberg, LeRoy, MD on 3/03/2024 at 7:06 AM     Finalized by (CST): Stromberg, LeRoy, MD on 3/03/2024 at 7:08 AM       US THORACENTESIS GUIDED LEFT (CPT=32555)    Result Date: 3/1/2024  CONCLUSION:  Ultrasound-guided left thoracentesis was performed without complication.   LOCATION:  [facility]    Dictated by (CST): Shorty Herrera MD on 3/01/2024 at 9:04 PM     Finalized by (CST): Shorty Herrera MD on 3/01/2024 at 9:07 PM       XR CHEST AP PORTABLE  (CPT=71045)    Result Date: 3/1/2024  CONCLUSION:  1. Decreased left pleural effusion no pneumothorax post thoracentesis. 2. Congestive heart failure and increased right perihilar edema.   LOCATION:  [facility]      Dictated by (CST): Brent Duran MD on 3/01/2024 at 8:55 PM     Finalized by (CST): Brent Duran MD on 3/01/2024 at 9:00 PM       US KIDNEY/BLADDER (CPT=76770)    Result Date: 2/29/2024  CONCLUSION:  1. There is protrusion of median lobe of prostate into bladder lumen which is most likely a large BPH nodule. 2. Kidneys are otherwise sonographically unremarkable.    LOCATION:  [facility]      Dictated by (CST): Agus Ferguson MD on 2/29/2024 at 2:50 PM     Finalized by (CST): Agus Ferguson MD on 2/29/2024 at 2:51 PM          Meds:      insulin degludec  5 Units Subcutaneous Daily    rivaroxaban  15 mg Oral Daily with food    multivitamin with minerals  1 tablet Oral Daily    insulin aspart  1-5 Units Subcutaneous TID AC and HS    [Held by provider] atorvastatin  40 mg Oral Nightly    dilTIAZem HCl ER  180 mg Oral Daily    levothyroxine  25 mcg Oral Before breakfast    piperacillin-tazobactam  3.375 g Intravenous Q8H      dilTIAZem Stopped (03/03/24 1332)     metoprolol, dilTIAZem, ipratropium-albuterol, acetaminophen, acetaminophen **OR** acetaminophen **OR** acetaminophen, glucose **OR** glucose **OR** glucose-vitamin C **OR** dextrose **OR** glucose **OR** glucose **OR** glucose-vitamin C, polyethylene glycol (PEG 3350), sennosides, bisacodyl, fleet enema, ondansetron, metoclopramide

## 2024-03-03 NOTE — PLAN OF CARE
Assumed pt care around 0730. Pt AOX4, rose marie speaking only, family at bedside assisting with translation. O2 sats >90% on 4 L HFNC. Afib on tele. Cardizem gtt titrated to maintain HR<120, able to turn off cardiziem gtt around 1330. Calortie count. Up w/2 to chair. See emar and flowsheets for further data.     Around 1530- pt O2 sats 86-88%, afib w/ -130's, hypertensive. Afebrile. Prn metoprolol and cardiziem given w/ minimal response. RT called, respiratory treatment given, pt placed on bipap.     Around 1700- HR still not improved, cardiziem gtt restarted. Pt w/ rigors, afebrile. Order for demerol received. Pt breathing on bipap and HR improved.

## 2024-03-03 NOTE — PLAN OF CARE
Alert and oriented, initially denies pain, nausea and dyspnea.  Crackles noted to left posterior lung base, 5L HFNC increased to 8L in response to sudden increase in HR to 130's and BP to 160's and desat to mid 80's, remains afebrile for now without rigors.  Note appears dyspneic now but he denies.  Cardizem 10 mg IV bolus and increased rate from 5 to 10 mg/hr.  Remains in AFib with occasional V pacing spikes.  Active bowel sounds, annette urine to intact Primofit.  Bilateral thoracentesis sites intact with small amounts of old drainage.  Skin reddish from mid calf down with +1 edema, note pedal pulse left foot is +1.  Education provided re: importance of SCD's and they have been applied.  Family to stay at bedside.  Hearing aids are in.  See assessments and flowsheets for further data.      At 2135 increased O2 from 5L to 8L - difficult to obtain pulse ox and patient is restless which is new.  It looks like he's trying to spike a temp but he denies feeling cold and denies dyspnea.  Cardizem 10 mg bolus and increased drip rate to 10 mg/hr.  At 2315 note temp 100.1, Tylenol given and Cardizem increased to 15 mg/hr (-140's), resp distress - increased HFNC to 15 L.  At 2330 suctioned (small amount tan sputum) and placed on BiPAP 12/5 100%, APN and RT updated, RR 40's.  Suctioned again at 2350 with small amount tan sputum.  At present (0050) patient is asleep, HR is 80-90 with resp rate upper 20's, satting upper 90's.  Family member at bedside, has been kept updated.    At 0212 pt pulling mask asking to have it removed - placed on 10 L HF, later cannula off and was satting 86% on room air so placed back on 4L.  Is back to same condition as when I started my shift other than increase in Cardizem.

## 2024-03-03 NOTE — PROGRESS NOTES
[facility]    Mahsa Zhang Patient Status:  Inpatient    1935 MRN [id number]   [facility] 4SW-A Attending Veronica Hood,    Hosp Day # 6 PCP Agus Martinez MD     Critical Care Progress Note     Date of Admission: 2024 12:34 PM  Admission Diagnosis: Dehydration [E86.0]  Lactic acid acidosis [E87.20]  Elevated liver enzymes [R74.8]  Fever, unspecified fever cause [R50.9]  Urinary tract infection without hematuria, site unspecified [N39.0]     S: still spiking low grade fevers, developed asst tachycardia/dyspnea.  Subsequently required BIPAP for a period of time.  HR now controlled- off BIPAP.      Current Medications:    Current Facility-Administered Medications:     insulin degludec 100 units/mL flextouch 5 Units, 5 Units, Subcutaneous, Daily    rivaroxaban (Xarelto) tab 15 mg, 15 mg, Oral, Daily with food    metoprolol (Lopressor) 5 mg/5mL injection 5 mg, 5 mg, Intravenous, Q6H PRN    dilTIAZem (cardIZEM) 25 mg/5mL injection 10 mg, 10 mg, Intravenous, Q6H PRN    [COMPLETED] dilTIAZem 10 mg BOLUS FROM BAG infusion, 10 mg, Intravenous, Once **AND** dilTIAZem (cardIZEM) 100 mg in sodium chloride 0.9% 100 mL IVPB-ADDV, 2.5-20 mg/hr, Intravenous, Continuous    multivitamin with minerals (Thera M Plus) tab 1 tablet, 1 tablet, Oral, Daily    ipratropium-albuterol (Duoneb) 0.5-2.5 (3) MG/3ML inhalation solution 3 mL, 3 mL, Nebulization, Q6H PRN    acetaminophen (Ofirmev) 10 mg/mL infusion premix 1,000 mg, 1,000 mg, Intravenous, Q6H PRN    acetaminophen (Tylenol) tab 650 mg, 650 mg, Oral, Q4H PRN **OR** acetaminophen (Tylenol) 160 MG/5ML oral liquid 650 mg, 650 mg, Oral, Q4H PRN **OR** acetaminophen (Tylenol) rectal suppository 650 mg, 650 mg, Rectal, Q4H PRN    glucose (Dex4) 15 GM/59ML oral liquid 15 g, 15 g, Oral, Q15 Min PRN **OR** glucose (Glutose) 40% oral gel 15 g, 15 g, Oral, Q15 Min PRN **OR** glucose-vitamin C (Dex-4) chewable tab 4 tablet, 4 tablet, Oral, Q15 Min PRN **OR**  dextrose 50% injection 50 mL, 50 mL, Intravenous, Q15 Min PRN **OR** glucose (Dex4) 15 GM/59ML oral liquid 30 g, 30 g, Oral, Q15 Min PRN **OR** glucose (Glutose) 40% oral gel 30 g, 30 g, Oral, Q15 Min PRN **OR** glucose-vitamin C (Dex-4) chewable tab 8 tablet, 8 tablet, Oral, Q15 Min PRN    insulin aspart (NovoLOG) 100 Units/mL FlexPen 1-5 Units, 1-5 Units, Subcutaneous, TID AC and HS    polyethylene glycol (PEG 3350) (Miralax) 17 g oral packet 17 g, 17 g, Oral, Daily PRN    sennosides (Senokot) tab 17.2 mg, 17.2 mg, Oral, Nightly PRN    bisacodyl (Dulcolax) 10 MG rectal suppository 10 mg, 10 mg, Rectal, Daily PRN    fleet enema (Fleet) 7-19 GM/118ML rectal enema 133 mL, 1 enema, Rectal, Once PRN    ondansetron (Zofran) 4 MG/2ML injection 4 mg, 4 mg, Intravenous, Q6H PRN    metoclopramide (Reglan) 5 mg/mL injection 10 mg, 10 mg, Intravenous, Q8H PRN    [Held by provider] atorvastatin (Lipitor) tab 40 mg, 40 mg, Oral, Nightly    [Held by provider] dilTIAZem ER (CardIZEM CD) 24 hr cap 180 mg, 180 mg, Oral, Daily    levothyroxine (Synthroid) tab 25 mcg, 25 mcg, Oral, Before breakfast    piperacillin-tazobactam (Zosyn) 3.375 g in dextrose 5% 100 mL IVPB-ADDV, 3.375 g, Intravenous, Q8H     OBJECTIVE:  /45 (BP Location: Left arm)   Pulse 67   Temp 97.3 °F (36.3 °C) (Temporal)   Resp (!) 32   Ht 177.8 cm (5' 10\")   Wt 159 lb 13.3 oz (72.5 kg)   SpO2 93%   BMI 22.93 kg/m²      5L nc      Wt Readings from Last 3 Encounters:   03/03/24 159 lb 13.3 oz (72.5 kg)   11/06/23 144 lb 3.2 oz (65.4 kg)   04/30/23 179 lb 7.3 oz (81.4 kg)        I/O last 3 completed shifts:  In: 2756.6 [P.O.:1680; I.V.:476.6; IV PIGGYBACK:600]  Out: 4000 [Urine:3000; Drains:1000]  No intake/output data recorded.     General appearance: alert, appears stated age, and cooperative  Lungs: clear to auscultation bilaterally  Heart: regular rate and rhythm  Abdomen: soft, non-tender; bowel sounds normal; no masses,  no organomegaly  Extremities:  extremities normal, atraumatic, no cyanosis or edema     Lab Results   Component Value Date    WBC 5.9 03/03/2024    RBC 4.74 03/03/2024    HGB 10.7 03/03/2024    HCT 32.7 03/03/2024    MCV 69.0 03/03/2024    MCH 22.6 03/03/2024    MCHC 32.7 03/03/2024    RDW 17.0 03/03/2024    .0 03/03/2024     Lab Results   Component Value Date     03/03/2024    K 4.0 03/03/2024     03/03/2024    CO2 22.0 03/03/2024    BUN 28 03/03/2024    CREATSERUM 1.34 03/03/2024     03/03/2024    CA 9.0 03/03/2024    ALKPHO 382 03/03/2024     03/03/2024    AST 98 03/03/2024    BILT 1.3 03/03/2024    ALB 1.6 03/03/2024    TP 5.2 03/03/2024     Lab Results   Component Value Date    INR 1.40 (H) 03/03/2024    INR 1.43 (H) 03/02/2024    INR 1.49 (H) 03/01/2024         Imaging: CXR: CMG, significant pulmonary edema     ASSESSMENT/PLAN:  Sepsis due to pyelonephritis and PNA- BP improved  -LA-normalized  -avoid additional IVF boluses at this point  -MRSA negative, stopped vanco; continue zosyn   -chest CT showed consolidations and pleural effusions.  -still having intermittent fevers- ID following, w/u unrevaling thus far  Acute hypoxic resp failure- due to PNA/CHF/sepsis/effusions  - desaturated in midst of fevers and required initiation of NIPPV; overall better after thora  - wean O2 as tolerated  - bronchial hygiene  Pleural effusions- bilateral  - s/p R thoracentesis on 2/28 with 1.3L of transudative fluid c/w CHF  - s/p L thoracentesis on 3/1/24 with 1.2L of transudative fluid removed, cw CHF  - needs ongoing diuresis bc of risk for recurrence  - given recurring fevers w/o obvious signs of new infection,checked inflammatory markers; RF normal. CRP elevated but nonspecific.  Consider steroid challenge  Abdominal pain RUQ  -Elevated LFTs  -Abdominal US with mild GB thickening but no stones or torres's sign   - GI consulted; w/u ongoing  Coagulopathy- with elevated INR  -Had been holding Xarelto, but can restart  after thoracentesis   -no signs of bleeding  -INR daily  HFpEF, HTN, HL, AFib- BNP markedly elevated  -Cardizem gtt. BB for better control  -Cardiology following  -diuresis as tolerated  F/E/N:  minimize IV fluids, follow lytes and replete prn, ADAT   Proph: on xarelto  Dispo: DNAR/Selective Treatment  - crit care time: 35 min  - consider transfer to floor later today if he remains off NIPPV  - d/w family    Faustino Palacios MD  3/3/2024  9:56 AM

## 2024-03-03 NOTE — PROGRESS NOTES
03/03/24 0201   BiPAP   $ RT Standby Charge (per 15 min) 1   $ BiPAP in use daily Yes   Device V60   BiPAP / CPAP CE# 4   BiPAP bacteria filter Yes   BIPAP plugged into main power? Yes   Mode Spontaneous/Timed   Interface Full face mask   Mask Size Large   Control Settings   Set Rate 14 breaths/min   Set IPAP 12   Set EPAP 5   Oxygen Percent 50 %   Inspiratory time 0.9   Insp rise time 3   BiPAP/CPAP Alarm Settings   Hi Rate 45   Low Rate 10   Hi VT 1200   Low    Hi Pressure 25   Low Pressure 7   Low Pressure Delay 20   Low MV 2   BiPAP/CPAP Monitored Parameters   PIP 13   Total Rate 29 breaths/min   Minute Volume 15   Tidal Volume 511   Total Leak 20   Trigger % 98   Ti/Ttot % 33   IPAP 12   EPAP 5   Toleration Well     Patient placed on bipap during the night due to IWOB. Bipap now on standby and patient in on a 4L NC, tolerating well.

## 2024-03-03 NOTE — PROGRESS NOTES
03/03/24 1746   BiPAP   $ RT Standby Charge (per 15 min) 1   Device V60   BiPAP / CPAP CE# 4   Mode Spontaneous/Timed   Interface Full face mask   Mask Size Large   Control Settings   Set Rate 14 breaths/min   Set IPAP 14   Set EPAP 6   Oxygen Percent 70 %   Inspiratory time 0.9   Insp rise time 3   SIPAP   Resp (!) 32   FiO2 (%) 70 %   BiPAP/CPAP Alarm Settings   Hi Rate 45   Low Rate 10   Hi VT 1200   Low    Hi Pressure 25   Low Pressure 7   Low Pressure Delay 20   Low MV 2   High MV (L/min) 20   BiPAP/CPAP Monitored Parameters   Pulse 111   PIP 16   Total Rate 32 breaths/min   Minute Volume 34   Tidal Volume 984   Total Leak 15   Trigger % 100   Ti/Ttot % 38   IPAP 14   EPAP 6   Toleration Well     Received pt @1500 on 4LNC. Around @1515 RN called saying pt was desatting, and had an increased WOB. Breath sounds bilaterally had expiratory wheezing with crackles in the bases. Gave pt neb tx with little to no improvement. Sx orally and got thick tan yellow secretions. After neb tx, placed pt on bipap settings listed above. Pt is currently tolerating bipap with less WOB. Pt is still tachypneic. Will continue to monitor.

## 2024-03-03 NOTE — PROGRESS NOTES
[facility]  Progress Note    Mahsa Zhang Patient Status:  Inpatient    1935 MRN [id number]   Location [facility] 4SW-A Attending Veronica Hood, DO   Hosp Day # 6 PCP Agus Martinez MD     Subjective:   - Had an episode of fever last night leading to HR jump to 120s. Diltiazem gtt was increased to 20mg/hr.  Since then, his HR has improved to 60-70s this morning.   Denies any chest pain, SOB or palpitations.     Objective:  Vitals:    24 0600 24 0700 24 0800 24 0900   BP: 95/50 92/50 106/49 100/45   BP Location:    Left arm   Pulse: 80 78 73 67   Resp: (!) 32 26 (!) 35 (!) 32   Temp:    97.3 °F (36.3 °C)   TempSrc:    Temporal   SpO2: 94% 91% 92% 93%   Weight:       Height:           Temp (24hrs), Av.4 °F (36.9 °C), Min:96.9 °F (36.1 °C), Max:100.1 °F (37.8 °C)      Medications:   Scheduled:    insulin degludec  5 Units Subcutaneous Daily    rivaroxaban  15 mg Oral Daily with food    multivitamin with minerals  1 tablet Oral Daily    insulin aspart  1-5 Units Subcutaneous TID AC and HS    [Held by provider] atorvastatin  40 mg Oral Nightly    dilTIAZem HCl ER  180 mg Oral Daily    levothyroxine  25 mcg Oral Before breakfast    piperacillin-tazobactam  3.375 g Intravenous Q8H       Continuous Infusion:    dilTIAZem 5 mg/hr (24 0923)       PRN Medications:   metoprolol, dilTIAZem, ipratropium-albuterol, acetaminophen, acetaminophen **OR** acetaminophen **OR** acetaminophen, glucose **OR** glucose **OR** glucose-vitamin C **OR** dextrose **OR** glucose **OR** glucose **OR** glucose-vitamin C, polyethylene glycol (PEG 3350), sennosides, bisacodyl, fleet enema, ondansetron, metoclopramide    Intake/Output:     Intake/Output Summary (Last 24 hours) at 3/3/2024 1021  Last data filed at 3/3/2024 0528  Gross per 24 hour   Intake 1455.6 ml   Output 1550 ml   Net -94.4 ml       Wt Readings from Last 3 Encounters:   24 159 lb 13.3 oz (72.5 kg)   23 144 lb 3.2 oz  (65.4 kg)   04/30/23 179 lb 7.3 oz (81.4 kg)       Allergies:  No Known Allergies    Physical Exam:   General:  Well-developed / Well-nourished.  No acute distress.  HEENT:  Normocephalic.  Atraumatic.  No icterus.  Neck:  There is no jugular venous distention.   Cardiovascular:  Cardiovascular examination demonstrates an irregular rate and rhythm.  There is normal S1, S2.  There is no S3 or S4.  There are no murmurs, rubs, or gallops.  No click is appreciated.  PMI is nondisplaced with a normal apical impulse.    Pulmonary:  Lungs are coarse on auscultation bilaterally.  There are no focal rales, rhonchi, or wheezes.  Good air movement is noted throughout both lung fields.   Abdomen:  The abdomen is soft, non-distended, and non-tender.  Bowel sounds are present and normoactive.  No organomegaly is appreciated.  Extremities:  Extremities do not demonstrate any evidence of peripheral edema.   No cyanosis or clubbing of the digits is appreciated.  Neurologic:  Alert and oriented.  Normal affect.  Integument:  No visible rashes are appreciated.      Laboratory/Data:   Recent Labs   Lab 03/01/24  0540 03/02/24  0435 03/02/24  1406 03/03/24  0431   * 160*  --  165*   BUN 24* 22  --  28*   CREATSERUM 1.22 1.21  --  1.34*   CA 8.8 8.7  --  9.0   ALB 1.9* 1.8*  --  1.6*   * 132*  --  131*   K 4.0 3.3* 3.9 4.0    104  --  102   CO2 19.0* 21.0  --  22.0   ALKPHO 364* 357*  --  382*   * 132*  --  98*   * 190*  --  137*   BILT 1.3 1.4  --  1.3   TP 5.8* 5.5*  --  5.2*       Recent Labs   Lab 02/29/24  0356 03/01/24  0540 03/02/24  0435 03/03/24  0431   RBC 4.69 4.96 5.05 4.74   HGB 10.8* 11.7* 11.8* 10.7*   HCT 33.5* 34.3* 34.3* 32.7*   MCV 71.4* 69.2* 67.9* 69.0*   MCH 23.0* 23.6* 23.4* 22.6*   MCHC 32.2 34.1 34.4 32.7   RDW 16.9 17.2 17.5 17.0   NEPRELIM 4.31 4.04  --  4.75   WBC 5.2 5.0 4.7 5.9   .0 200.0 170.0 188.0       Recent Labs   Lab 03/01/24  0540 03/02/24  0435 03/03/24  0438    PTP 18.2* 17.5* 17.2*   INR 1.49* 1.43* 1.40*       No results for input(s): \"TROP\", \"CK\" in the last 168 hours.      Tele: AF - rate elevated with fever    Diagnostics:    Echo:   Conclusions:     1. Left ventricle: The cavity size was normal. Wall thickness was at the      upper limits of normal. Systolic function was normal. The estimated      ejection fraction was 50-55%, by visual assessment. No diagnostic      evidence for regional wall motion abnormalities. Unable to assess LV      diastolic function due to heart rhythm.   2. Right ventricle: The cavity size was increased. Pacer wire noted in the      right ventricle. The RV pressure during systole is 56mm Hg.   3. Left atrium: The left atrial volume was mildly increased.   4. Aortic valve: There was mild regurgitation.   5. Tricuspid valve: There was moderate regurgitation.   6. Ascending aorta: The ascending aorta was 3.9cm diameter.       Assessment:     Atrial Fibrillation  -Rate controlled on IV CCB  -Xarelto held for further thoracentesis  2.     SSS                -S/P PPM  3.    Sepsis                -Pyelonephritis                -PNA  4.    HTN Hx  5.    Hypothyroidism  6.    DM  7.    Anemia  8.    Coagulopathy  9.    Elevated LFTs  10.  NL EF  11.  Pulmonary HTN  12.  Pleural effusion                -S/P Thora 2/29/24  13.  HFpEF  14.  Respiratory insufficiency   -Component of CHF   -ARDS suspected   -On BiPAP    Plan:    Resume PO diltiazem 180mg daily - once given, can dc the diltiazem gtt.    IV BB PRN if rates inadequately controlled onPO CCB with fevers   S/p thoracentesis 3/1/24 -- pt reports improvement in his breathing   Hold IV lasix today   O2 improved to 4L this morning   ID following -- on abx   Resume anticoagulation once there are no further procedures planned.    Puma Grant DO  Cardiologist, [facility]

## 2024-03-04 NOTE — PHYSICAL THERAPY NOTE
Following for PT this date.  D/w RN.  Pt not appropriate for PT at this time.  Will f/u at a later date.

## 2024-03-04 NOTE — PROGRESS NOTES
CRITICAL CARE            S: Pt has had \"rigors\" and respiratory distress overnight.     Meds:   insulin degludec  5 Units Subcutaneous Daily    rivaroxaban  15 mg Oral Daily with food    multivitamin with minerals  1 tablet Oral Daily    insulin aspart  1-5 Units Subcutaneous TID AC and HS    [Held by provider] atorvastatin  40 mg Oral Nightly    dilTIAZem HCl ER  180 mg Oral Daily    levothyroxine  25 mcg Oral Before breakfast    piperacillin-tazobactam  3.375 g Intravenous Q8H       Prn Meds:  metoprolol, dilTIAZem, ipratropium-albuterol, acetaminophen, acetaminophen **OR** acetaminophen **OR** acetaminophen, glucose **OR** glucose **OR** glucose-vitamin C **OR** dextrose **OR** glucose **OR** glucose **OR** glucose-vitamin C, polyethylene glycol (PEG 3350), sennosides, bisacodyl, fleet enema, ondansetron, metoclopramide    Infusions:   dilTIAZem 5 mg/hr (03/04/24 0400)       OBJECTIVE:  Vitals:    03/04/24 0435 03/04/24 0500 03/04/24 0600 03/04/24 0700   BP:  132/61 126/46 128/52   Pulse: 88 90 87 85   Resp: 26 (!) 27 23 26   Temp:       TempSrc:       SpO2: 94% 96% 94% 95%   Weight:       Height:         O2: 5LPM    Gen - awake, no distress  Lungs - CTAB  CV - regular rate & rhythm. Normal S1, S2. No murmurs, rubs, or gallops appreciated.  Abdomen - soft, nontender to palpation   Extremities - No cyanosis, clubbing, edema appreciated.      Labs:  Recent Labs   Lab 03/02/24 0435 03/03/24  0431 03/04/24  0442   WBC 4.7 5.9 10.5   HGB 11.8* 10.7* 11.2*   .0 188.0 194.0     Recent Labs   Lab 02/26/24 2038 02/27/24  0430 02/27/24  0946 02/27/24  2046 03/02/24  0435 03/02/24  1406 03/03/24  0431 03/04/24  0442   NA  --  129*  --    < > 132*  --  131* 128*   K  --  4.4  --    < > 3.3* 3.9 4.0 4.0   CL  --  103  --    < > 104  --  102 100   CO2  --  16.0*  --    < > 21.0  --  22.0 22.0   BUN  --  31*  --    < > 22  --  28* 30*   CREATSERUM  --  1.43*  --    < > 1.21  --  1.34* 1.37*   GLU  --  157*  --    <  > 160*  --  165* 127*   ANIONGAP  --  10  --    < > 7  --  7 6   ALB  --  2.5*  --    < > 1.8*  --  1.6* 1.5*   CA  --  8.5  --    < > 8.7  --  9.0 9.6   ALKPHO  --  223*  --    < > 357*  --  382* 453*   AST  --  558*  --    < > 132*  --  98* 86*   ALT  --  283*  --    < > 190*  --  137* 125*   BILT  --  1.3  --    < > 1.4  --  1.3 1.6   TP  --  6.9  --    < > 5.5*  --  5.2* 5.2*   LIP  --  18  --   --   --   --   --   --    LACTI 6.6* 4.8* 1.9  --   --   --   --   --     < > = values in this interval not displayed.     Recent Labs   Lab 03/01/24  0540 03/02/24  0435 03/03/24  0431   INR 1.49* 1.43* 1.40*     Recent Labs   Lab 02/26/24  1200 03/02/24  0435   PBNP 9,971* 22,207*   TROPHS 39  --      Recent Labs   Lab 02/29/24  0356 03/01/24  0540 03/02/24  0435   *  --   --    CRP  --  9.64* 10.30*   PCT  --   --  1.53*       Recent Labs   Lab 02/26/24  1200 03/01/24  1513   COVID19 Not Detected Not Detected   INFAPCR Negative Not Detected   INFBPCR Negative Not Detected   RSV Negative  --        Imaging reviewed    ASSESSMENT AND PLAN      Acute hypoxemic respiratory failure - due to pneumonia, CHF, pleural effusions  - wean O2 as able  - BIPAP prn  - antibiotics as below  - diuretics prn   Sepsis - due to possible pyelonephritis and pneumonia. Chest CT showed consolidations and pleural effusions. Still having \"rigors\"  -continue Zosyn  -ID is following  Bilateral pleural effusions - s/p R thoracentesis 2/28 and L thoracentesis on 3/1/24 ; fluid consistent with transudate, likely CHF  - diuresis w/ lasix, per cardiology  RUQ pain / elevated LFTs - Abdominal US with mild GB thickening but no stones or torres's sign. Labs have improved  - continue to monitor  - GI has signed off  Elevated INR - improved  - monitor labs  HFpEF - EF 50-55% and RVSP 56 on echo  -diuresis w/ lasix  Atrial fib/RVR  -Cardizem gtt for rate control  -anticoagulated w/ Xarelto  -Cardiology following  DM2  -monitor accuchecks  -insulin  degludec and aspart  Nutrition  -advance diet as tolerated. May need to initiate tube feeds if unable to consume adequate nutrition  Proph  -xarelto  Dispo:   - DNAR/Selective Treatment  - ICU monitoring; consider transfer to floor soon if remains stable.     Gianni Zimmerman M.D.  Pulmonary/Critical Care   On call Intensivist 03/04/24

## 2024-03-04 NOTE — PROGRESS NOTES
[facility]   part of [facility]    Progress Note     Mahsa Zhang Patient Status:  Inpatient    1935 MRN [id number]   Location [facility] 4SW-A Attending Holden Avelar MD   Hosp Day # 7 PCP Agus Martinez MD     Follow up for: The primary encounter diagnosis was Urinary tract infection without hematuria, site unspecified. Diagnoses of Dehydration, Elevated liver enzymes, Fever, unspecified fever cause, and Lactic acid acidosis were also pertinent to this visit.      Interval History/Subjective:     PHILLY overnight  Afebrile, HDS  Weaned to HFNC overnight, currently 5LPM     Denies complaints     Vital signs:  Temp:  [96.8 °F (36 °C)-99.5 °F (37.5 °C)] 97.8 °F (36.6 °C)  Pulse:  [] 90  Resp:  [21-36] 28  BP: (104-179)/(44-97) 140/59  SpO2:  [88 %-99 %] 93 %  FiO2 (%):  [40 %-100 %] 40 %    Physical Exam:    General: NAD, Comfortable, Nontoxic though chronically ill appearing   Respiratory: CTAB; reg resp rate & effort, no wheezes/crackles  Cardiovascular: S1, S2. Regular rate and rhythm. No murmurs appreciated  Abdomen: Soft, NTND, no guarding/rebound   Neurologic: No focal neurological deficits.   Extremities: No edema.   Skin: Dry, no rashes, ulcers or lesions     Diagnostic Data:      Labs:  Recent Labs   Lab 24  0657 24  0356 24  0540 24  0435 24  0431 24  0442   WBC 6.8 5.2 5.0 4.7 5.9 10.5   HGB 11.6* 10.8* 11.7* 11.8* 10.7* 11.2*   MCV 70.8* 71.4* 69.2* 67.9* 69.0* 68.0*   .0 234.0 200.0 170.0 188.0 194.0   INR 3.27* 1.97* 1.49* 1.43* 1.40*  --        Recent Labs   Lab 24  0435 24  1406 24  0431 24  0442   *  --  165* 127*   BUN 22  --  28* 30*   CREATSERUM 1.21  --  1.34* 1.37*   CA 8.7  --  9.0 9.6   ALB 1.8*  --  1.6* 1.5*   *  --  131* 128*   K 3.3* 3.9 4.0 4.0     --  102 100   CO2 21.0  --  22.0 22.0   ALKPHO 357*  --  382* 453*   *  --  98* 86*   *  --  137* 125*   BILT 1.4   --  1.3 1.6   TP 5.5*  --  5.2* 5.2*       Recent Labs   Lab 03/01/24  0540 03/02/24  0435 03/03/24  0431   PTP 18.2* 17.5* 17.2*   INR 1.49* 1.43* 1.40*       No results for input(s): \"TROP\", \"CK\" in the last 168 hours.         Imaging: Imaging data reviewed in Epic.    Medications:    dilTIAZem HCl ER  240 mg Oral Daily    furosemide  20 mg Intravenous Daily    insulin degludec  5 Units Subcutaneous Daily    rivaroxaban  15 mg Oral Daily with food    multivitamin with minerals  1 tablet Oral Daily    insulin aspart  1-5 Units Subcutaneous TID AC and HS    [Held by provider] atorvastatin  40 mg Oral Nightly    levothyroxine  25 mcg Oral Before breakfast    piperacillin-tazobactam  3.375 g Intravenous Q8H       ASSESSMENT / PLAN:     Mahsa Zhang Is a a 88 year old male who presented with fever/chills, generalized weakness, found to have septic shock secondary to pyelonephritis    Problem List / Diagnoses    Septic shock-resolved  Acute pyelonephritis  Acute respiratory failure with hypoxia  Bilateral pleural effusions  Acute on chronic HFpEF  A-fib with RVR  Hypothyroidism  DM2  Dysphagia  Moderate to severe protein calorie malnutrition    Plan  Septic shock- resolved  Elevated lactate- resolved  Likely secondary to acute pyelonephritis, right  Fevers and chills  - Lactate elevated, repeat now clear  - Broad-spectrum antibiotics, cont IV Zosyn, dc IV vancomycin  - CT abdomen pelvis suggestive of acute pyelonephritis, UA grossly abnormal  - Blood cultures x 2 NGTD, urine cultures reviewed, no significant resistance  - Afebrile since 3/2/24, monitor   - ID on consult, case discussed, deescalate antibiotics to ceftriaxone 3/4, EOT 3/6/24     Acute hypoxic respiratory failure -- improving   Pleural effusion bilaterally  - Status post thoracentesis on the right 2/29  -Status post left thoracentesis 3/1  - Pleural fluid analysis suggestive of transudative fluid, concern for heart failure (below)   --off BiPAP, now on  high flow nasal cannula, weaning O2 as able  - Sputum cultures obtained  - Pulmonology following, recommendations reviewed     Hypothyroidism  - Continue levothyroxine if able to take p.o.     Type 2 diabetes with hyperglycemia  - ISS, Accu-Cheks     Arrhythmia  Bradycardia status post pacemaker  A-fib with RVR  - On Xarelto for AC  - Started on diltiazem drip, continue 3/4  - Continue p.o. diltiazem/metoprolol, titration per cardiology     Suspect acute on chronic diastolic heart failure-improving  - Continue diuresis per cardiology  - 2D echo- reviewed  - Telemetry     Dysphagia  - History of esophageal stricture that was dilated few months ago  - Recently treated with esophageal candidiasis  - Still unable to tolerate solids  - Speech evaluation-passed  - GI on consult-family deferring EGD at this time.  Will focus on acute issues for now     Elevated liver enzymes-improving  - Possibly from shock?  - Ultrasound liver- with small ascites  - trend liver enzymes,  - Workup per GI, recommendations reviewed-GI now signed off     Protein calorie malnutrition, moderate to severe  - Poor caloric intake  - Patient/family not willing to consider tube feeds at this time  - Dietitian on consult for calorie count     Advance care planning  - POA: Son  - CODE STATUS: DNR/select-discussed with patient and his family at bedside and on presentation       DVT Mechanical Prophylaxis:   SCDs,    DVT Pharmacologic Prophylaxis   Medication    rivaroxaban (Xarelto) tab 15 mg              Code Status: DNAR/Selective Treatment    Dispo: Stable in ICU; DELMI >2 minutes    Plan of care discussed with patient and/or family at bedside.    MARIAJOSE Avelar MD  [facility]   [phone]      This note was created using voice recognition technology. It may include inadvertent transcriptional errors. Any such errors should be contextually interpreted and should not be taken to alter the content or the meaning.     Note to Patient: The  21st Century Cures Act makes medical notes like these available to patients in the interest of transparency. However, be advised this is a medical document. It is intended as peer to peer communication. It is written in medical language and may contain abbreviations or verbiage that are unfamiliar. It may appear blunt or direct. Medical documents are intended to carry relevant information, facts as evident, and the clinical opinion of the practitioner and not intended to be the primary source of your information.  Please refer directly to myself or clinical staff for information regarding plan of care.

## 2024-03-04 NOTE — PROGRESS NOTES
Infectious Disease Progress Note      Date of admission: 2/26/2024 12:34 PM     Reason for consult: Sepsis, UTI    Subjective: Feels better.  No abdominal pain.  No nausea vomiting.  No diarrhea.  No shortness of breath.  No cough or sputum production.    The rest of the systems were reviewed and found to be negative except was mentioned above    Interval events: This is an 88-year-old male patient presenting here with sepsis, fevers chills and flank pain due to what appears to be a left-sided pyelonephritis.  The patient also had bilateral thoracentesis done due to pleural effusions, both of which are consistent with transudative fluids.  Blood cultures with no growth to date.  TTE without vegetations.  Currently on IV Zosyn with clinical improvement.  Now developed volume overload and pulmonary edema    Medications:    dilTIAZem HCl ER    metoclopramide    furosemide    insulin degludec    rivaroxaban    metoprolol    dilTIAZem    multivitamin with minerals    ipratropium-albuterol    acetaminophen    acetaminophen **OR** acetaminophen **OR** acetaminophen    glucose **OR** glucose **OR** glucose-vitamin C **OR** dextrose **OR** glucose **OR** glucose **OR** glucose-vitamin C    insulin aspart    polyethylene glycol (PEG 3350)    sennosides    bisacodyl    fleet enema    ondansetron    [Held by provider] atorvastatin    levothyroxine    piperacillin-tazobactam     Allergies:  No Known Allergies    Physical Exam:  Vitals:    03/04/24 1200   BP: (!) 161/75   Pulse: 108   Resp: (!) 29   Temp: 98.1 °F (36.7 °C)     Vitals signs and nursing note reviewed.   Constitutional:       Appearance: Normal appearance.   HENT:      Head: Normocephalic and atraumatic.      Mouth: Mucous membranes are moist.   Neck:      Musculoskeletal: Neck supple.   Cardiovascular:      Rate and Rhythm: Normal rate.      Heart sounds: Normal heart sounds. No murmur. No friction rub. No gallop.    Pulmonary:      Effort: Pulmonary effort is  normal. No respiratory distress.      Breath sounds: Normal breath sounds. No stridor. No wheezing, rhonchi or rales.   Chest:      Chest wall: No tenderness.   Abdominal:      General: Abdomen is flat. There is no distension.      Palpations: Abdomen is soft. There is no mass.      Tenderness: There is no tenderness. There is no guarding or rebound.      Hernia: No hernia is present.   Musculoskeletal:      Right lower leg: No edema.      Left lower leg: No edema.   Skin:     General: Skin is warm and dry.   Neurological:      General: No focal deficit present.      Mental Status: Alert and oriented to person, place, and time.       Laboratory data:  I have reviewed all the lab results independently.  Lab Results   Component Value Date    WBC 10.5 03/04/2024    HGB 11.2 03/04/2024    HCT 33.4 03/04/2024    .0 03/04/2024    CREATSERUM 1.37 03/04/2024    BUN 30 03/04/2024     03/04/2024    K 4.0 03/04/2024     03/04/2024    CO2 22.0 03/04/2024     03/04/2024    CA 9.6 03/04/2024    ALB 1.5 03/04/2024    ALKPHO 453 03/04/2024    BILT 1.6 03/04/2024    TP 5.2 03/04/2024    AST 86 03/04/2024     03/04/2024      Recent Labs   Lab 03/04/24  0442   RBC 4.91   HGB 11.2*   HCT 33.4*   MCV 68.0*   MCH 22.8*   MCHC 33.5   RDW 17.3   NEPRELIM 8.73*   WBC 10.5   .0      Microbiology data:  Hospital Encounter on 02/26/24   1. Blood Culture     Status: None (Preliminary result)    Collection Time: 03/02/24  2:06 PM    Specimen: Blood,peripheral   Result Value Ref Range    Blood Culture Result No Growth 1 Day N/A   2. Body Fluid Cult Aerobic and Anaerobic     Status: None (Preliminary result)    Collection Time: 03/01/24 10:30 PM    Specimen: Pleural Fluid, Left; Body fluid, unspecified   Result Value Ref Range    Body Fluid Culture Result No Growth 2 Days N/A    Body Fluid Smear 1+ WBCs seen N/A    Body Fluid Smear No organisms seen N/A    Body Fluid Smear This is a cytocentrifuged smear.  N/A   3. Urine Culture, Routine     Status: None    Collection Time: 02/26/24  3:39 PM    Specimen: Urine, clean catch   Result Value Ref Range    Urine Culture  N/A     10-50,000 cfu/ml Multiple species present-probable contamination.        Radiology:  I have reviewed all imagining data available independently.   Chest x-ray on 3/4:  Pulmonary edema, mild improvement noted    Impression:  Mahsa Zhang is a 88 year old male with    Acute left-sided pyelonephritis, with question of infarcts  Blood cultures with no growth to date  Urine culture with no growth to date  Currently on IV Zosyn with clinical improvement  EBV and CMV serologies consistent with remote infection  Question of pneumonia seen on his CT chest  Could be also related to his effusions, compressive atelectasis  However, currently on IV Zosyn with clinical improvement  Volume overload in the last 48 hours  Could be related to IV Zosyn given the high sodium load    Recommendations:    Discontinue IV Zosyn and start ceftriaxone 2 g daily  Plan on finishing 10 days of therapy through 3/6  ID will sign off, please call us with any questions or changes status.  Thank you for this consultation.    The plan of care was discussed with the primary hospital team, Holden Avelar MD     Recommendations were also discussed with the patient; all questions were answered.     Thank you for this consultation. Please don't hesitate to call the ID team for questions or any acute changes in patient's clinical condition.    Please note that this report has been produced using speech recognition software and may contain errors related to that system including, but not limited to, errors in grammar, punctuation, and spelling, as well as words and phrases that possibly may have been recognized inappropriately.  If there are any questions or concerns, contact the dictating provider for clarification.    The 21st Century Cures Act makes medical notes like these available to  patients in the interest of transparency. Please be advised this is a medical document. Medical documents are intended to carry relevant information, facts as evident, and the clinical opinion of the practitioner. The medical note is intended as peer to peer communication and may appear blunt or direct. It is written in medical language and may contain abbreviations or verbiage that are unfamiliar.     Becki Gonzales MD  [facility]. Tel: [phone]. Fax: [phone].     Mahsa Zhang : 1935 MRN: [id number] [facility]: [id number]

## 2024-03-04 NOTE — DIETARY NOTE
[facility]   part of [facility]   CLINICAL NUTRITION    Noted calorie count ordered 3/2. Only 2 tickets collected (only drinking [employer] and apple sauce). Per RN this AM, pt mostly taking liquids at this time. Pt likely needs supplemental TF but per hospitalist note, family not willing to consider TF at this time. RD will continue to monitor intake and follow up as appropriate. RD available as needed for TF recs.    Karolyn Terry RD, LDN, [facility]  Clinical Dietitian  Spectra: [phone]

## 2024-03-04 NOTE — PROGRESS NOTES
[facility]  Progress Note    Mahsa Zhang Patient Status:  Inpatient    1935 MRN [id number]   Location [facility] 4SW-A Attending Veronica Hood,    Hosp Day # 7 PCP Agus Martinez MD     Subjective:     Denies any chest pain, SOB or palpitations. Ongoing O2 requirements. Pulmonary edema on Cxr.     Objective:  Vitals:    24 0435 24 0500 24 0600 24 0700   BP:  132/61 126/46 128/52   BP Location:       Pulse: 88 90 87 85   Resp: 26 (!) 27 23 26   Temp:       TempSrc:       SpO2: 94% 96% 94% 95%   Weight:       Height:           Temp (24hrs), Av.1 °F (36.7 °C), Min:96.8 °F (36 °C), Max:99.5 °F (37.5 °C)      Medications:   Scheduled:    insulin degludec  5 Units Subcutaneous Daily    rivaroxaban  15 mg Oral Daily with food    multivitamin with minerals  1 tablet Oral Daily    insulin aspart  1-5 Units Subcutaneous TID AC and HS    [Held by provider] atorvastatin  40 mg Oral Nightly    dilTIAZem HCl ER  180 mg Oral Daily    levothyroxine  25 mcg Oral Before breakfast    piperacillin-tazobactam  3.375 g Intravenous Q8H       Continuous Infusion:    dilTIAZem 5 mg/hr (24 0400)       PRN Medications:   metoprolol, dilTIAZem, ipratropium-albuterol, acetaminophen, acetaminophen **OR** acetaminophen **OR** acetaminophen, glucose **OR** glucose **OR** glucose-vitamin C **OR** dextrose **OR** glucose **OR** glucose **OR** glucose-vitamin C, polyethylene glycol (PEG 3350), sennosides, bisacodyl, fleet enema, ondansetron, metoclopramide    Intake/Output:     Intake/Output Summary (Last 24 hours) at 3/4/2024 0818  Last data filed at 3/4/2024 0531  Gross per 24 hour   Intake 1801.4 ml   Output 1390 ml   Net 411.4 ml       Wt Readings from Last 3 Encounters:   24 161 lb 13.1 oz (73.4 kg)   23 144 lb 3.2 oz (65.4 kg)   23 179 lb 7.3 oz (81.4 kg)       Allergies:  No Known Allergies    Physical Exam:   General:  Well-developed / Well-nourished.  No acute  distress.  HEENT:  Normocephalic.  Atraumatic.  No icterus.  Neck:  There is no jugular venous distention.   Cardiovascular:  Cardiovascular examination demonstrates an irregular rate and rhythm.  There is normal S1, S2.  There is no S3 or S4.  There are no murmurs, rubs, or gallops.  No click is appreciated.  PMI is nondisplaced with a normal apical impulse.    Pulmonary:  Lungs are coarse on auscultation bilaterally.  There are rales to bases, rhonchi, or wheezes.  Good air movement is noted throughout both lung fields.   Abdomen:  The abdomen is soft, non-distended, and non-tender.  Bowel sounds are present and normoactive.  No organomegaly is appreciated.  Extremities:  Extremities do not demonstrate any evidence of peripheral edema.   No cyanosis or clubbing of the digits is appreciated.  Neurologic:  Alert and oriented.  Normal affect.  Integument:  No visible rashes are appreciated.      Laboratory/Data:   Recent Labs   Lab 03/02/24 0435 03/02/24  1406 03/03/24  0431 03/04/24  0442   *  --  165* 127*   BUN 22  --  28* 30*   CREATSERUM 1.21  --  1.34* 1.37*   CA 8.7  --  9.0 9.6   ALB 1.8*  --  1.6* 1.5*   *  --  131* 128*   K 3.3* 3.9 4.0 4.0     --  102 100   CO2 21.0  --  22.0 22.0   ALKPHO 357*  --  382* 453*   *  --  98* 86*   *  --  137* 125*   BILT 1.4  --  1.3 1.6   TP 5.5*  --  5.2* 5.2*       Recent Labs   Lab 03/01/24  0540 03/02/24  0435 03/03/24  0431 03/04/24  0442   RBC 4.96 5.05 4.74 4.91   HGB 11.7* 11.8* 10.7* 11.2*   HCT 34.3* 34.3* 32.7* 33.4*   MCV 69.2* 67.9* 69.0* 68.0*   MCH 23.6* 23.4* 22.6* 22.8*   MCHC 34.1 34.4 32.7 33.5   RDW 17.2 17.5 17.0 17.3   NEPRELIM 4.04  --  4.75 8.73*   WBC 5.0 4.7 5.9 10.5   .0 170.0 188.0 194.0       Recent Labs   Lab 03/01/24  0540 03/02/24  0435 03/03/24  0431   PTP 18.2* 17.5* 17.2*   INR 1.49* 1.43* 1.40*       No results for input(s): \"TROP\", \"CK\" in the last 168 hours.      Tele: AF - rate elevated with  fever    Diagnostics:    Echo:   Conclusions:     1. Left ventricle: The cavity size was normal. Wall thickness was at the      upper limits of normal. Systolic function was normal. The estimated      ejection fraction was 50-55%, by visual assessment. No diagnostic      evidence for regional wall motion abnormalities. Unable to assess LV      diastolic function due to heart rhythm.   2. Right ventricle: The cavity size was increased. Pacer wire noted in the      right ventricle. The RV pressure during systole is 56mm Hg.   3. Left atrium: The left atrial volume was mildly increased.   4. Aortic valve: There was mild regurgitation.   5. Tricuspid valve: There was moderate regurgitation.   6. Ascending aorta: The ascending aorta was 3.9cm diameter.       Assessment:     Atrial Fibrillation  -Rate controlled 80-90s  - On  dilt and dilt gtt  2.     SSS                -S/P PPM  3.    Sepsis                -Pyelonephritis                -PNA  4.    HTN Hx   - normotensive   5.    Hypothyroidism  6.    DM  7.    Anemia  8.    Coagulopathy  9.    Elevated LFTs  10.  NL EF  11.  Pulmonary HTN  12.  Pleural effusion                -S/P Thora 2/29/24  13.  HFpEF   - net negative 5.2L  14.  Respiratory insufficiency   -Component of CHF   -ARDS suspected   -On BiPAP    Plan:    Increase PO diltiazem to 240mg-  once given, dc the diltiazem gtt.    IV BB PRN if rates inadequately controlled on PO CCB with fevers  Continue Xarelto   20mg IV lasix daily- closely monitor renal function   Wean o2 as able   ID following -- on abx        Dahlia Donnelly, SANGEETA  3/4/2024  8:19 AM

## 2024-03-04 NOTE — PLAN OF CARE
Pt alert and oriented x4. 5L HFNC at start of shift, increased throughout to 12L HFNC, see flowsheets. SOB with exertion to chair, up to chair for 1 hour this afternoon. Low grade fever this afternoon, HR max 120s-130s but not sustained. PO Cardizem increased per cards this am, Cardizem gtt off at that time. Lasix x1 this am. Voiding via external cath. Poor intake, encouraged by son. Family at bedside and updated on POC.

## 2024-03-04 NOTE — SLP NOTE
SPEECH DAILY NOTE - INPATIENT    ASSESSMENT & PLAN   ASSESSMENT  Pt seen for dysphagia tx to assess tolerance with recommended diet, ensure proper utilization of aspiration precautions and provide pt/family education.  Patient received alert in bed with son present at bedside. Family reported good tolerance of thin liquids and soft solids. He continues to consume mostly pureed foods, but will occasionally have small amounts of soft solids (rice, lentils). No overt s/s of aspiration observed during my visit today. Patient denied odynophagia. Recommend patient continue a regular diet and thin liquids. Patient/family able to appropriately choose softer items as needed. No further SLP services warranted at this time.    Diet Recommendations - Solids: Regular  Diet Recommendations - Liquids: Thin Liquids          Medication Administration Recommendations: One pill at a time    Patient Experiencing Pain: No                Treatment Plan  Treatment Plan/Recommendations: Dysphagia therapy    Interdisciplinary Communication: Discussed with RN            GOALS  Goal #1 The patient will tolerate regular consistency and thin liquids without overt signs or symptoms of aspiration with 95 % accuracy over 1-2 session(s).  Met   Goal #2 The patient/family/caregiver will demonstrate understanding and implementation of aspiration precautions and swallow strategies independently over 1-2 session(s).     Met       FOLLOW UP  Follow Up Needed (Documentation Required): No  SLP Follow-up Date: 03/04/24  Number of Visits to Meet Established Goals: 1    Session: 1/1    If you have any questions, please contact JACOB Admaes

## 2024-03-04 NOTE — PLAN OF CARE
Drowsy, refuses to interact, irritable sabrina to light touch to assess IV sites, rotate pulse ox, examine pupils, etc.  Does follow commands in all extremities.  Was pulling at BiPAP mask - titrated to HFNC. Diminished throughout lung fields.  Remains in AFib with rare V pacing spikes. Active bowel sounds, annette urine to intact Primofit. Bilateral thoracentesis sites intact with small amounts of old drainage. Skin reddish from mid calf down with +1 edema, note pedal pulse left foot is +1. Denies pain.  SCD's reapplied.  Family to stay at bedside. Right hearing aids in/dentures out (per shirt report left is missing).  See assessments and flowsheets for further data.     Left hearing aid found and placed back into ear.  More interactive, withdrawn.  Down to 5L HFNC.

## 2024-03-05 NOTE — PROGRESS NOTES
[facility]  Progress Note    Mahsa Zhang Patient Status:  Inpatient    1935 MRN [id number]   Location [facility] 4SW-A Attending Veronica Hood,    Hosp Day # 8 PCP Agus Martinez MD     Subjective:   Appears comfortable.  Family at bedside to translate.  On O2 NC at this point.  No chest pain. No abdominal pain.  No new focal cardiovascular complaints reported.    Objective:  Vitals:    24 0500 24 0600 24 0700 24 0800   BP: 138/71 145/74 137/66    BP Location:       Pulse: 109 111 105    Resp: 22 (!) 28 26    Temp:       TempSrc:       SpO2: 94% 91% 93% 93%   Weight:       Height:           Temp (24hrs), Av °F (37.2 °C), Min:98.1 °F (36.7 °C), Max:99.8 °F (37.7 °C)      Medications:   Scheduled:    dilTIAZem HCl ER  240 mg Oral Daily    furosemide  20 mg Intravenous Daily    cefTRIAXone  2 g Intravenous Q24H    insulin degludec  5 Units Subcutaneous Daily    rivaroxaban  15 mg Oral Daily with food    multivitamin with minerals  1 tablet Oral Daily    insulin aspart  1-5 Units Subcutaneous TID AC and HS    [Held by provider] atorvastatin  40 mg Oral Nightly    levothyroxine  25 mcg Oral Before breakfast       Continuous Infusion:         PRN Medications:   metoclopramide, metoprolol, dilTIAZem, ipratropium-albuterol, acetaminophen, acetaminophen **OR** acetaminophen **OR** acetaminophen, glucose **OR** glucose **OR** glucose-vitamin C **OR** dextrose **OR** glucose **OR** glucose **OR** glucose-vitamin C, polyethylene glycol (PEG 3350), sennosides, bisacodyl, fleet enema, ondansetron    Intake/Output:     Intake/Output Summary (Last 24 hours) at 3/5/2024 0826  Last data filed at 3/5/2024 0000  Gross per 24 hour   Intake 518.6 ml   Output 1450 ml   Net -931.4 ml       Wt Readings from Last 3 Encounters:   24 161 lb 13.1 oz (73.4 kg)   23 144 lb 3.2 oz (65.4 kg)   23 179 lb 7.3 oz (81.4 kg)       Allergies:  No Known Allergies    Physical Exam:    General:  Well-developed / Well-nourished.  No acute distress.  HEENT:  Normocephalic.  Atraumatic.  No icterus. Dry appearing mucous membranes.    Neck:  There is no jugular venous distention.   Cardiovascular:  Cardiovascular examination demonstrates an irregular rate and rhythm.  There is normal S1, S2.  There is no S3 or S4.  There are no murmurs, rubs, or gallops.  No click is appreciated.  PMI is nondisplaced with a normal apical impulse.    Pulmonary:  Lungs are coarse on auscultation bilaterally.  There are no focal rales, rhonchi, or wheezes.  Good air movement is noted throughout both lung fields.   Abdomen:  The abdomen is soft, non-distended, and non-tender.  Bowel sounds are present and normoactive.  No organomegaly is appreciated.  Extremities:  Extremities do not demonstrate any evidence of peripheral edema.   No cyanosis or clubbing of the digits is appreciated.  Neurologic:  Alert and oriented.  Normal affect.  Integument:  No visible rashes are appreciated.      Laboratory/Data:   Recent Labs   Lab 03/03/24 0431 03/04/24 0442 03/05/24 0411   * 127* 176*   BUN 28* 30* 32*   CREATSERUM 1.34* 1.37* 1.45*   CA 9.0 9.6 10.0   ALB 1.6* 1.5* 1.6*   * 128* 127*   K 4.0 4.0 4.2    100 101   CO2 22.0 22.0 21.0   ALKPHO 382* 453* 585*   AST 98* 86* 82*   * 125* 110*   BILT 1.3 1.6 1.7   TP 5.2* 5.2* 5.4*       Recent Labs   Lab 03/03/24 0431 03/04/24 0442 03/05/24 0411   RBC 4.74 4.91 5.46   HGB 10.7* 11.2* 12.4*   HCT 32.7* 33.4* 38.1*   MCV 69.0* 68.0* 69.8*   MCH 22.6* 22.8* 22.7*   MCHC 32.7 33.5 32.5   RDW 17.0 17.3 18.6   NEPRELIM 4.75 8.73* 10.98*   WBC 5.9 10.5 14.5*   .0 194.0 228.0       Recent Labs   Lab 03/01/24  0540 03/02/24  0435 03/03/24  0431   PTP 18.2* 17.5* 17.2*   INR 1.49* 1.43* 1.40*       No results for input(s): \"TROP\", \"CK\" in the last 168 hours.      Tele: AF - rate controlled    Diagnostics:    Echo:   Conclusions:     1. Left ventricle: The  cavity size was normal. Wall thickness was at the      upper limits of normal. Systolic function was normal. The estimated      ejection fraction was 50-55%, by visual assessment. No diagnostic      evidence for regional wall motion abnormalities. Unable to assess LV      diastolic function due to heart rhythm.   2. Right ventricle: The cavity size was increased. Pacer wire noted in the      right ventricle. The RV pressure during systole is 56mm Hg.   3. Left atrium: The left atrial volume was mildly increased.   4. Aortic valve: There was mild regurgitation.   5. Tricuspid valve: There was moderate regurgitation.   6. Ascending aorta: The ascending aorta was 3.9cm diameter.       Assessment:     Atrial Fibrillation  -Rate controlled on PO CCB  -On Xarelto  2.     SSS                -S/P PPM  3.    Sepsis                -Pyelonephritis                -PNA  4.    HTN Hx  5.    Hypothyroidism  6.    DM  7.    Anemia  8.    Coagulopathy  9.    Elevated LFTs   -Improving  10.  NL EF  11.  Pulmonary HTN  12.  Pleural effusion                -S/P Thora  13.  HFpEF  14.  Respiratory insufficiency   -Component of CHF   -ARDS suspected   -On O2  15.  Mild RI  16.  Increasing WBC  17.  Hyponatremia    Plan:    PO CCB rate control   IV BB PRN if rates inadequately controlled on IV CCB with fevers   IV Lasix today - monitor volume status closely   O2 NC support   ATBx per ID   Tele monitor   F/U GABRIELLE Duong MD  3/5/2024

## 2024-03-05 NOTE — PROGRESS NOTES
[facility]   part of [facility]    Progress Note     Mahsa Zhang Patient Status:  Inpatient    1935 MRN [id number]   Location [facility] 4SW-A Attending Holden Avelar MD   Hosp Day # 8 PCP Agus Martinez MD     Follow up for: The primary encounter diagnosis was Urinary tract infection without hematuria, site unspecified. Diagnoses of Dehydration, Elevated liver enzymes, Fever, unspecified fever cause, and Lactic acid acidosis were also pertinent to this visit.      Interval History/Subjective:     PHILLY overnight  Afebrile, HR continues to be uncontrolled   O2 requirements 5-10LPM    More drowsy today per son, but pt able to open his eyes to voice, denies complaints    Vital signs:  Temp:  [97.8 °F (36.6 °C)-99.8 °F (37.7 °C)] 99.1 °F (37.3 °C)  Pulse:  [] 105  Resp:  [21-38] 26  BP: (125-168)/(50-88) 137/66  SpO2:  [86 %-97 %] 93 %    Physical Exam:    General: NAD, Comfortable, Nontoxic though chronically ill appearing   Respiratory: CTAB; reg resp rate & effort, no wheezes/crackles  Cardiovascular: S1, S2. IRIR, rates 110-130 on monitor; No murmurs appreciated  Abdomen: Soft, NTND, no guarding/rebound   Neurologic: No focal neurological deficits.   Extremities: No edema.   Skin: Dry, no rashes, ulcers or lesions     Diagnostic Data:      Labs:  Recent Labs   Lab 24  0657 24  0356 24  0540 24  0435 24  0431 24  0442 24  0411   WBC 6.8 5.2 5.0 4.7 5.9 10.5 14.5*   HGB 11.6* 10.8* 11.7* 11.8* 10.7* 11.2* 12.4*   MCV 70.8* 71.4* 69.2* 67.9* 69.0* 68.0* 69.8*   .0 234.0 200.0 170.0 188.0 194.0 228.0   INR 3.27* 1.97* 1.49* 1.43* 1.40*  --   --        Recent Labs   Lab 24  0431 24  0442 24  0411   * 127* 176*   BUN 28* 30* 32*   CREATSERUM 1.34* 1.37* 1.45*   CA 9.0 9.6 10.0   ALB 1.6* 1.5* 1.6*   * 128* 127*   K 4.0 4.0 4.2    100 101   CO2 22.0 22.0 21.0   ALKPHO 382* 453* 585*   AST 98* 86* 82*   ALT  137* 125* 110*   BILT 1.3 1.6 1.7   TP 5.2* 5.2* 5.4*       Recent Labs   Lab 03/01/24  0540 03/02/24  0435 03/03/24  0431   PTP 18.2* 17.5* 17.2*   INR 1.49* 1.43* 1.40*       No results for input(s): \"TROP\", \"CK\" in the last 168 hours.         Imaging: Imaging data reviewed in Epic.    Medications:    dilTIAZem HCl ER  240 mg Oral Daily    furosemide  20 mg Intravenous Daily    cefTRIAXone  2 g Intravenous Q24H    insulin degludec  5 Units Subcutaneous Daily    rivaroxaban  15 mg Oral Daily with food    multivitamin with minerals  1 tablet Oral Daily    insulin aspart  1-5 Units Subcutaneous TID AC and HS    [Held by provider] atorvastatin  40 mg Oral Nightly    levothyroxine  25 mcg Oral Before breakfast       ASSESSMENT / PLAN:     Mahsa Zhang Is a a 88 year old male who presented with fever/chills, generalized weakness, found to have septic shock secondary to pyelonephritis    Problem List / Diagnoses    Septic shock-resolved  Acute pyelonephritis  Acute respiratory failure with hypoxia  Bilateral pleural effusions  Acute on chronic HFpEF  A-fib with RVR  Hypothyroidism  DM2  Dysphagia  Moderate to severe protein calorie malnutrition    Plan    Drowsiness  -- pt protecting airway, opens eyes to voice  -- exam unremarkable, labs not suggestive of respiratory acidosis  -- recommended ABG to pt's son, who declined for now; willing to consider if mental status continues to worsen     Septic shock- resolved  Elevated lactate- resolved  Likely secondary to acute pyelonephritis, right  Fevers and chills  - Lactate elevated, repeat now clear  - Broad-spectrum antibiotics, cont IV Zosyn, dc IV vancomycin  - CT abdomen pelvis suggestive of acute pyelonephritis, UA grossly abnormal  - Blood cultures x 2 NGTD, urine cultures reviewed, no significant resistance  - Afebrile since 3/2/24, monitor   - ID on consult, case discussed, deescalate antibiotics to ceftriaxone 3/4, EOT 3/6/24     Acute hypoxic respiratory failure --  improving   Pleural effusion bilaterally  - Status post thoracentesis on the right 2/29  -Status post left thoracentesis 3/1  - Pleural fluid analysis suggestive of transudative fluid, concern for heart failure (below)   --off BiPAP, now on high flow nasal cannula, weaning O2 as able  - Sputum cultures obtained  - Pulmonology following, recommendations reviewed     Hypothyroidism  - Continue levothyroxine if able to take p.o.     Type 2 diabetes with hyperglycemia  - ISS, Accu-Cheks     Arrhythmia  Bradycardia status post pacemaker  A-fib with RVR --unimproved   - On Xarelto for AC  - Started on diltiazem drip, continue 3/4  - Continue p.o. diltiazem/metoprolol, titration per cardiology  -- is on iv BB prn for uncontrolled rates      Suspect acute on chronic diastolic heart failure-improving  - Continue diuresis per cardiology  - 2D echo- reviewed  - Telemetry     Dysphagia  - History of esophageal stricture that was dilated few months ago  - Recently treated with esophageal candidiasis  - Still unable to tolerate solids  - Speech evaluation-passed  - GI on consult-family deferring EGD at this time.  Will focus on acute issues for now     Elevated liver enzymes-improving  - Possibly from shock?  - Ultrasound liver- with small ascites  - trend liver enzymes,  - Workup per GI, recommendations reviewed-GI now signed off     Protein calorie malnutrition, moderate to severe  - Poor caloric intake  - Patient/family not willing to consider tube feeds at this time  - Dietitian on consult for calorie count     Advance care planning  - POA: Son  - CODE STATUS: DNR/select-discussed with patient and his family at bedside and on presentation       DVT Mechanical Prophylaxis:   SCDs,    DVT Pharmacologic Prophylaxis   Medication    rivaroxaban (Xarelto) tab 15 mg              Code Status: DNAR/Selective Treatment    Dispo: Stable in ICU; DELMI >2 minutes    Plan of care discussed with patient and/or family at bedside.    MARIAJOSE Archer  MD Valentino  [facility]   [phone]      This note was created using voice recognition technology. It may include inadvertent transcriptional errors. Any such errors should be contextually interpreted and should not be taken to alter the content or the meaning.     Note to Patient: The 21st Century Cures Act makes medical notes like these available to patients in the interest of transparency. However, be advised this is a medical document. It is intended as peer to peer communication. It is written in medical language and may contain abbreviations or verbiage that are unfamiliar. It may appear blunt or direct. Medical documents are intended to carry relevant information, facts as evident, and the clinical opinion of the practitioner and not intended to be the primary source of your information.  Please refer directly to myself or clinical staff for information regarding plan of care.

## 2024-03-05 NOTE — PROGRESS NOTES
CRITICAL CARE            S: Pt has no shortness of breath or cough today. No new issues overnight.     Meds:   dilTIAZem HCl ER  240 mg Oral Daily    furosemide  20 mg Intravenous Daily    cefTRIAXone  2 g Intravenous Q24H    insulin degludec  5 Units Subcutaneous Daily    rivaroxaban  15 mg Oral Daily with food    multivitamin with minerals  1 tablet Oral Daily    insulin aspart  1-5 Units Subcutaneous TID AC and HS    [Held by provider] atorvastatin  40 mg Oral Nightly    levothyroxine  25 mcg Oral Before breakfast       Prn Meds:  metoclopramide, metoprolol, dilTIAZem, ipratropium-albuterol, acetaminophen, acetaminophen **OR** acetaminophen **OR** acetaminophen, glucose **OR** glucose **OR** glucose-vitamin C **OR** dextrose **OR** glucose **OR** glucose **OR** glucose-vitamin C, polyethylene glycol (PEG 3350), sennosides, bisacodyl, fleet enema, ondansetron    Infusions:        OBJECTIVE:  Vitals:    03/05/24 0100 03/05/24 0200 03/05/24 0300 03/05/24 0400   BP: 158/71 130/84 148/63 137/75   Pulse: 111 112 (!) 121 111   Resp: 26 (!) 27 25 (!) 29   Temp:    99.1 °F (37.3 °C)   TempSrc:    Temporal   SpO2: 94% 93% 95% 93%   Weight:       Height:         O2: 8LPM    Gen - awake, no distress  Lungs - CTAB  CV - regular rate & rhythm. Normal S1, S2. No murmurs, rubs, or gallops appreciated.  Abdomen - soft, nontender to palpation   Extremities - No cyanosis, clubbing, edema appreciated.      Labs:  Recent Labs   Lab 03/03/24  0431 03/04/24  0442 03/05/24  0411   WBC 5.9 10.5 14.5*   HGB 10.7* 11.2* 12.4*   .0 194.0 228.0     Recent Labs   Lab 02/27/24  0946 02/27/24  2046 03/03/24  0431 03/04/24  0442 03/05/24  0411   NA  --    < > 131* 128* 127*   K  --    < > 4.0 4.0 4.2   CL  --    < > 102 100 101   CO2  --    < > 22.0 22.0 21.0   BUN  --    < > 28* 30* 32*   CREATSERUM  --    < > 1.34* 1.37* 1.45*   GLU  --    < > 165* 127* 176*   ANIONGAP  --    < > 7 6 5   ALB  --    < > 1.6* 1.5* 1.6*   CA  --    < >  9.0 9.6 10.0   ALKPHO  --    < > 382* 453* 585*   AST  --    < > 98* 86* 82*   ALT  --    < > 137* 125* 110*   BILT  --    < > 1.3 1.6 1.7   TP  --    < > 5.2* 5.2* 5.4*   LACTI 1.9  --   --   --   --     < > = values in this interval not displayed.     Recent Labs   Lab 03/01/24  0540 03/02/24  0435 03/03/24  0431   INR 1.49* 1.43* 1.40*     Recent Labs   Lab 03/02/24  0435   PBNP 22,207*     Recent Labs   Lab 02/29/24  0356 03/01/24  0540 03/02/24  0435   *  --   --    CRP  --  9.64* 10.30*   PCT  --   --  1.53*       Recent Labs   Lab 03/01/24  1513   COVID19 Not Detected   INFAPCR Not Detected   INFBPCR Not Detected       Imaging reviewed    ASSESSMENT AND PLAN      Acute hypoxemic respiratory failure - due to pneumonia, CHF, pleural effusions  - wean O2 as able  - BIPAP prn  - continue antibiotics as below  - diuretics prn   Sepsis - due to possible pyelonephritis and pneumonia. Chest CT showed consolidations and pleural effusions.  -continue ceftriaxone  -ID is following  Bilateral pleural effusions - s/p R thoracentesis 2/28 and L thoracentesis on 3/1/24 ; fluid consistent with transudate, likely CHF  - diuresis w/ lasix, per cardiology  RUQ pain / elevated LFTs - Abdominal US with mild GB thickening but no stones or torres's sign. Labs have improved  - continue to monitor  - GI has signed off  Elevated INR - improved  - monitor labs  HFpEF - EF 50-55% and RVSP 56 on echo  -diuresis w/ lasix  Atrial fib/RVR  -Cardizem for rate control  -anticoagulated w/ Xarelto  -Cardiology following  DM2  -monitor accuchecks  -insulin degludec and aspart  Nutrition  -regular diet  Proph  -xarelto  Dispo:   - DNAR/Selective Treatment  - transfer to floor.     Pulmonary service can continue to follow after floor transfer.     Gianni Zimmerman M.D.  Pulmonary/Critical Care   On call Intensivist 03/04/24

## 2024-03-05 NOTE — PLAN OF CARE
Patient A&Ox4. Follows commands. Son at bedside translating. 12HF titrated to 8L. Tmax 99.2. VSS. No episodes of rigors.

## 2024-03-05 NOTE — PLAN OF CARE
Tolerating supplements for breakfast.   Appears comfortable, denying pain.  Bed alarm in place  Prima-fit in place with adequate UO.  Problem: Patient/Family Goals  Goal: Patient/Family Long Term Goal  Description: Patient's Long Term Goal: Go home    Interventions:  - cooperate with PT  - sit in the chair during the day  - employ anxiety coping mechanisms    - See additional Care Plan goals for specific interventions  Outcome: Progressing  Goal: Patient/Family Short Term Goal  Description: Patient's Short Term Goal: have HR <120    Interventions:   - cardizem drip changing to PO  - oxygen/BiPAP prn  - sit up in chair during the day    - See additional Care Plan goals for specific interventions  Outcome: Progressing

## 2024-03-05 NOTE — DIETARY NOTE
[facility]  NUTRITION ASSESSMENT    Pt does not meet malnutrition criteria at this time.    NUTRITION INTERVENTION:    Meal and Snacks - Change to CHO Controlled Vegetarian Diet as tolerated; monitor patient po intake. Encourage adequate po of appropriate diet.  Medical Food Supplements - RD added [employer] 1.0 Standard vanilla daily. Rationale/use for oral supplements discussed.  Vitamin and Mineral Supplements - Recommend adding Multivitamin with minerals    PATIENT STATUS:   3/5- Pt and granson in room during visit. Pt's grandson fed him a KF shake for BF and that was it so far. He was gonna offer some cereal. Pt is taking some apple sauce and juice, some KF shake and some yogurt. No % noted on kcal count tickets. 25% of meals noted in flowsheet. ~ 100-200 kcals per meal.  Continue to encourage po intakes as able and RD to follow per protocol.     [birth year]88 year old Jimmy speaking male admitted on 2/26 presents with urosepsis. Pt screened d/t MST score 2. Visited pt at bedside with son present who provided hx. He reports pt with decreased appetite/PO intake and associated wt loss since previous admission in October. Reports pt was having stomach aches and on liquid diet so he lost 15-20 lbs but unsure what his UBW was or is now. Per chart review, pt weighs 144 lbs during previous admit in Nov and current wt recorded at 163 lbs. Son reports pt with poor appetite currently and only had apple juice and a little milk of bfast. Denies GI symptoms at this time. SLP evaluated and recommends regular solids and thin liquids. NKFA but pt is vegetarian. Son reports pt drinking [employer]eves Glucose Support daily at home and agreeable to vanilla [employer] Standard during admit. Pt not appropriate for DM diet education at this time; will consider addressing as pt improves. All questions answered at this time.    PMH: Endocrine disorder, Fracture of left calcaneus (6/29/2016), Hearing impairment, Osteoarthrosis, Stroke,  Thyroid disease, Type II DM, essential hypertension, Unspecified sleep apnea    ANTHROPOMETRICS:  Ht: 177.8 cm (5' 10\")  Wt: 73.4 kg (161 lb 13.1 oz).   BMI: Body mass index is 23.22 kg/m².  IBW: 75.5 kg    WEIGHT HISTORY: Per chart, pt with ~16 lb wt loss x 10 months (9%, not significant per standards).  Patient Weight(s) for the past 336 hrs:   Weight   03/04/24 0000 73.4 kg (161 lb 13.1 oz)   03/03/24 0000 72.5 kg (159 lb 13.3 oz)   03/02/24 0500 70.8 kg (156 lb 1.4 oz)   03/01/24 0030 73.1 kg (161 lb 2.5 oz)   02/29/24 0200 74.7 kg (164 lb 10.9 oz)   02/28/24 0500 74 kg (163 lb 2.3 oz)   02/27/24 0313 71.5 kg (157 lb 10.1 oz)   02/26/24 2043 76.2 kg (168 lb)   02/26/24 1151 72.6 kg (160 lb)       Wt Readings from Last 10 Encounters:   03/04/24 73.4 kg (161 lb 13.1 oz)   11/06/23 65.4 kg (144 lb 3.2 oz)   04/30/23 81.4 kg (179 lb 7.3 oz)   05/20/22 84.8 kg (187 lb)   07/01/21 90.6 kg (199 lb 12.8 oz)   10/05/20 93.6 kg (206 lb 6.4 oz)   08/25/20 88.9 kg (196 lb)   09/19/19 90.5 kg (199 lb 9.6 oz)   08/29/19 92.1 kg (203 lb)   07/02/19 88.9 kg (196 lb)      NUTRITION:  Diet:       Procedures    Regular/General diet Calorie Restriction/Carb Controlled: 1800 kcal/60 grams; Is Patient on Accuchecks? Yes; Misc Restriction: Vegetarian        Percent Meals Eaten (last 3 days)       Date/Time Percent Meals Eaten (%)    03/02/24 1200 50 %    03/02/24 1600 0 %    03/04/24 0800 50 %    03/05/24 0900 25 %    03/05/24 1200 25 %          Food Allergies: No  Cultural/Ethnic/Buddhist Preferences Addressed: Yes - vegetarian.    GI SYSTEM REVIEW: WNL  Skin/Wounds: WNL    NUTRITION RELATED PHYSICAL FINDINGS:     1. Body Fat/Muscle Mass: mild muscle depletion Temple region and Clavicle region     2. Fluid Accumulation: generalized edema     NUTRITION PRESCRIPTION: 74 kg  Calories: 3261-0523 calories/day (25-30 kcal/kg)  Protein:  grams protein/day (1.0-1.5 gm/kg)  Fluid: ~1 ml/kcal or per MD discretion    NUTRITION  DIAGNOSIS/PROBLEM:  Inadequate oral intake related to insufficient appetite resulting in inadequate nutrition intake as evidenced by documented/reported insufficient oral intake and documented/reported unintentional weight loss    MONITOR AND EVALUATE/NUTRITION GOALS:  PO intake of 75% of meals TID - Not met, Continues  PO intake of 75% of oral nutrition supplement/s - Not met, Continues  Weight stable within 1 to 2 lbs during admission - Ongoing    MEDICATIONS:  SSI aspart, synthroid, abx., lasix    LABS:  , POC glucose x 24hrs: 124-275 mg/dl, A1c = 8.7%    Pt is at Moderate nutrition risk    Karlene Miguel RD, LDN  Clinical Dietitian

## 2024-03-05 NOTE — PROGRESS NOTES
3/5 AM: Pt is A/O x 2-3, Jimmy speaking. Lung sounds are diminished, room air is baseline. BP hypertensive, controlled Afib on tele. Primofit in place. Chest CPT PRN. QID Accucheck. Takes one pill at a time

## 2024-03-05 NOTE — PHYSICAL THERAPY NOTE
PHYSICAL THERAPY TREATMENT NOTE - INPATIENT    Room Number: 455/455-A     Session:    Number of Visits to Meet Established Goals: 3    Presenting Problem: UTI  Co-Morbidities : h/o HTN, TIA, DM2    ASSESSMENT   Patient demonstrates limited progress this session, goals  remain in progress.    Patient continues to function below baseline with bed mobility, transfers, and gait.  Contributing factors to remaining limitations include decreased functional strength and decreased endurance/aerobic capacity.  Next session anticipate patient to progress bed mobility, transfers, and gait.  Physical Therapy will continue to follow patient for duration of hospitalization.    Patient continues to benefit from continued skilled PT services  PLAN  PT Treatment Plan: Bed mobility;Body mechanics;Endurance;Energy conservation;Patient education;Family education;Gait training;Range of motion;Stoop training;Stair training;Transfer training;Balance training  Rehab Potential : Good  Frequency (Obs):  (2-3x/week)    CURRENT GOALS     Goal #1 Patient is able to demonstrate supine - sit EOB @ level: supervision      Goal #2 Patient is able to demonstrate transfers Sit to/from Stand at assistance level: supervision      Goal #3 Patient is able to ambulate 50 feet with assist device: walker - rolling at assistance level: supervision      Goal #4     Goal #5     Goal #6     Goal Comments: Goals established on 2024, ongoing 3/2/24.    SUBJECTIVE  Pt somnolent, more alert when seated EOB , pt's family member present to translate     OBJECTIVE  Precautions: Bed/chair alarm; needed    WEIGHT BEARING RESTRICTION  Weight Bearing Restriction: None                PAIN ASSESSMENT   Ratin          BALANCE                                                                                                                       Static Sitting: Fair -  Dynamic Sitting: Fair -           Static Standing: Not tested  Dynamic Standing: Not  tested    ACTIVITY TOLERANCE                         O2 WALK         AM-PAC '6-Clicks' INPATIENT SHORT FORM - BASIC MOBILITY  How much difficulty does the patient currently have...  Patient Difficulty: Turning over in bed (including adjusting bedclothes, sheets and blankets)?: A Little   Patient Difficulty: Sitting down on and standing up from a chair with arms (e.g., wheelchair, bedside commode, etc.): A Lot   Patient Difficulty: Moving from lying on back to sitting on the side of the bed?: A Lot   How much help from another person does the patient currently need...   Help from Another: Moving to and from a bed to a chair (including a wheelchair)?: A Lot   Help from Another: Need to walk in hospital room?: A Lot   Help from Another: Climbing 3-5 steps with a railing?: Total       AM-PAC Score:  Raw Score: 12   Approx Degree of Impairment: 68.66%   Standardized Score (AM-PAC Scale): 35.33   CMS Modifier (G-Code): CL    FUNCTIONAL ABILITY STATUS  Gait Assessment   Functional Mobility/Gait Assessment  Gait Assistance: Not tested  Distance (ft): 0  Assistive Device: Rolling walker  Pattern: Shuffle    Skilled Therapy Provided  RN consulted prior to session and is present in pt's room at beginning of session  Pt somnolent, responds to voice, becomes more alert when seated EOB  Pt follows commands c increased time and demos fair seated balance  Educated pt and family on benefits of mobility and role of PT  Transport arriving to take pt to PMU   Bed Mobility:  Rolling: min A  Supine<>Sit: min A for legs and trunk;  increased time sitting eob.    Sit<>Supine: mod x 1     Transfer Mobility:  Sit<>Stand: NT  Stand<>Sit:    Gait: Therapist's Comments: pt's spouse educated on benefits of mobility for pt to prevent deconditioning     Patient End of Session: In bed;With  staff;Needs met;Call light within reach;RN aware of session/findings;All patient questions and concerns addressed;Family present    PT Session Time: 23  minutes    Therapeutic Activity: 23 minutes

## 2024-03-06 NOTE — CONSULTS
[facility] Pharmacy Dosing Service      Initial Pharmacokinetic Consult for Vancomycin Dosing     Mahsa Zhang is a 88 year old male who is being initiated on vancomycin therapy for fever of unknown origin.  Pharmacy has been asked to dose vancomycin by Dr Steve.  The initial treatment and monitoring approach will be non-AUC strategy.        Weight and Temperature:    Wt Readings from Last 1 Encounters:   24 73.4 kg (161 lb 13.1 oz)        Temp Readings from Last 1 Encounters:   24 (!) 101.7 °F (38.7 °C) (Oral)      Labs:   Recent Labs   Lab 24  0431 24  0442 24  0411   CREATSERUM 1.34* 1.37* 1.45*      Estimated Creatinine Clearance: 37.8 mL/min (A) (based on SCr of 1.45 mg/dL (H)).     Recent Labs   Lab 242 24  0411   WBC 5.9 10.5 14.5*          The Pharmacokinetic Target is:    Trough/random 10-15 mg/L    Renal Dosing Considerations:    JARVIS/ARF     Assessment/Plan:   Initial/Loading dose: Will receive 1000 mg IV (15 mg/kg, capped at 2250 mg) x 1 initial dose.      Maintenance dose: Pharmacy will dose vancomycin at 1000 mg IV every 24 hours    Monitorin) Plan for vancomycin trough to be obtained at steady state    2) Pharmacy will order SCr as clinically indicated to assess renal function.    3) Pharmacy will monitor for toxicity and efficacy, adjust vancomycin dose and/or frequency, and order vancomycin levels as appropriate per the Pharmacy and Therapeutics Committee approved protocol until discontinuation of the medication.       We appreciate the opportunity to assist in the care of this patient.     Nae Seo, PharmD  3/5/2024  8:28 PM  [facility] Extension: [phone]

## 2024-03-06 NOTE — CONSULTS
Pulmonary Consult     Assessment / Plan:  Acute hypoxemic respiratory failure - due to pneumonia, CHF, pleural effusions  - wean O2 as able  - antibiotics as below  - diuretics prn   Sepsis - due to possible pyelonephritis and pneumonia. Chest CT showed consolidations and pleural effusions  - antibiotics per ID  Bilateral pleural effusions - s/p R thoracentesis 2/28 and L thoracentesis on 3/1/24 ; fluid consistent with transudate, likely CHF  - diuresis w/ lasix, per cardiology  RUQ pain / elevated LFTs - Abdominal US with mild GB thickening but no stones or torres's sign. Labs have improved  - continue to monitor  - GI has signed off  Elevated INR - improved  - monitor labs  HFpEF - EF 50-55% and RVSP 56 on echo  - diuresis w/ lasix  Atrial fib/RVR  - cardiology following  Proph  -xarelto  Dispo  - DNAR/select  - will follow     Mario Ronquillo MD  Pulmonary and Critical Care Medicine      History of Present Illness:   Mr. Zhang is a 88 year old with HFpEF admitted with acute respiratory failure due to pneumonia, CHF and pleural effusions and is now transferred from the ICU. He has no complaints this morning. Denies dyspnea, wheezing, cough.    12 point ROS negative except per HPI.    Past Medical History:   Diagnosis Date    Arrhythmia     BRADYCARDIA VS AFIB    Disorder of thyroid     Endocrine disorder     hypothyroid    Fracture of left calcaneus 6/29/2016    Resolved last addressed  7/25/16    Hearing impairment     High blood pressure     Osteoarthrosis, unspecified whether generalized or localized, unspecified site     Stroke (HCC) July 26, 2014    TIA    Thyroid disease     Type II or unspecified type diabetes mellitus without mention of complication, not stated as uncontrolled     Unspecified essential hypertension     Unspecified sleep apnea     Visual impairment     glasses       Past Surgical History:   Procedure Laterality Date    CARDIAC PACEMAKER PLACEMENT      REMV CATARACT EXTRACAP,INSERT LENS  Right 9/2/2015    Procedure: RIGHT PHACOEMULSIFICATION OF CATARACT WITH INTRAOCULAR LENS IMPLANT [id number];  Surgeon: Louis Jason MD;  Location: [facility]    REMV CATARACT EXTRACAP,INSERT LENS Left 9/16/2015    Procedure: LEFT PHACOEMULSIFICATION OF CATARACT WITH INTRAOCULAR LENS IMPLANT [id number];  Surgeon: Louis Jason MD;  Location: [facility]       Medications Prior to Admission   Medication Sig Dispense Refill Last Dose    traMADol 50 MG Oral Tab Take 1 tablet (50 mg total) by mouth daily.   2/26/2024    finasteride 5 MG Oral Tab Take 1 tablet (5 mg total) by mouth daily.       metoprolol tartrate 50 MG Oral Tab Take 1 tablet (50 mg total) by mouth 2x Daily(Beta Blocker). 90 tablet 0 2/26/2024 at 0900    rivaroxaban (XARELTO) 15 MG Oral Tab TAKE 1 TABLET BY MOUTH DAILY WITH FOOD 90 tablet 0 2/26/2024 at 0900    Ferrous Sulfate 325 (65 Fe) MG Oral Tab Take 1 tablet (325 mg total) by mouth 2 (two) times daily.   2/26/2024 at 0900    dilTIAZem HCl  MG Oral Capsule SR 24 Hr Take 1 capsule (180 mg total) by mouth daily. 30 capsule 1 2/26/2024 at 0900    levothyroxine 25 MCG Oral Tab Take 1 tablet (25 mcg total) by mouth before breakfast.   2/26/2024 at 0900    tamsulosin (FLOMAX) cap TAKE 1 CAPSULE BY MOUTH DAILY (Patient taking differently: Take 1 capsule (0.4 mg total) by mouth at bedtime.) 90 capsule 3 2/26/2024 at 0900    JANUVIA 25 MG Oral Tab TAKE 1 TABLET(25 MG) BY MOUTH DAILY 90 tablet 3 2/26/2024 at 0900    ATORVASTATIN 40 MG Oral Tab TAKE 1 TABLET BY MOUTH EVERY NIGHT AT BEDTIME 90 tablet 3 2/25/2024 at 2100    METFORMIN HCL 1000 MG Oral Tab TAKE 1 TABLET BY MOUTH TWICE DAILY WITH MEALS 180 tablet 3 2/26/2024 at 0900    aspirin (ASPIRIN EC LOW DOSE) 81 MG Oral Tab EC Take 1 tablet (81 mg total) by mouth once daily. 90 tablet 3 2/26/2024 at 0900    meclizine 12.5 MG Oral Tab Take 1 tablet (12.5 mg total) by mouth 3 (three) times daily as needed for Dizziness.        triamcinolone 0.1 % External Cream Apply 1 Application topically 2 (two) times daily as needed (legs and feet).       Glucose Blood (ONETOUCH ULTRA BLUE) In Vitro Strip Test blood glucose once daily  Diag E11.9 100 strip 3     OneTouch UltraSoft Lancets Does not apply Misc Test blood glucose once daily  Diag E11.9 100 each 3     Chelsea Microlet Lancets Does not apply Misc Check blood glucose daily  diag E11.9 100 each 3     Glucose Blood (CONTOUR NEXT TEST) In Vitro Strip Check blood glucose daily  diag E11.9 100 each 3     Glucose Blood (CHELSEA CONTOUR NEXT TEST) In Vitro Strip Use to check blood glucose once daily in the morning before breakfast 100 strip 3     CHELSEA MICROLET LANCETS Does not apply Misc Use to check blood glucose once daily in the morning before breakfast 100 each 3      Outpatient Medications Marked as Taking for the 2/26/24 encounter (Hospital Encounter)   Medication Sig Dispense Refill    traMADol 50 MG Oral Tab Take 1 tablet (50 mg total) by mouth daily.      finasteride 5 MG Oral Tab Take 1 tablet (5 mg total) by mouth daily.      metoprolol tartrate 50 MG Oral Tab Take 1 tablet (50 mg total) by mouth 2x Daily(Beta Blocker). 90 tablet 0    rivaroxaban (XARELTO) 15 MG Oral Tab TAKE 1 TABLET BY MOUTH DAILY WITH FOOD 90 tablet 0    Ferrous Sulfate 325 (65 Fe) MG Oral Tab Take 1 tablet (325 mg total) by mouth 2 (two) times daily.      dilTIAZem HCl  MG Oral Capsule SR 24 Hr Take 1 capsule (180 mg total) by mouth daily. 30 capsule 1    levothyroxine 25 MCG Oral Tab Take 1 tablet (25 mcg total) by mouth before breakfast.      tamsulosin (FLOMAX) cap TAKE 1 CAPSULE BY MOUTH DAILY (Patient taking differently: Take 1 capsule (0.4 mg total) by mouth at bedtime.) 90 capsule 3    JANUVIA 25 MG Oral Tab TAKE 1 TABLET(25 MG) BY MOUTH DAILY 90 tablet 3    ATORVASTATIN 40 MG Oral Tab TAKE 1 TABLET BY MOUTH EVERY NIGHT AT BEDTIME 90 tablet 3    METFORMIN HCL 1000 MG Oral Tab TAKE 1 TABLET BY MOUTH TWICE  DAILY WITH MEALS 180 tablet 3    aspirin (ASPIRIN EC LOW DOSE) 81 MG Oral Tab EC Take 1 tablet (81 mg total) by mouth once daily. 90 tablet 3      No Known Allergies   Social History     Socioeconomic History    Marital status:    Tobacco Use    Smoking status: Former     Types: Cigarettes     Quit date: 1984     Years since quittin.6    Smokeless tobacco: Former    Tobacco comments:     tobacco   Vaping Use    Vaping Use: Never used   Substance and Sexual Activity    Alcohol use: No    Drug use: No     Social Determinants of Health     Food Insecurity: Unknown (3/4/2024)    Food Insecurity     Food Insecurity: Patient declined   Transportation Needs: Unknown (3/4/2024)    Transportation Needs     Lack of Transportation: Patient declined   Housing Stability: Unknown (3/4/2024)    Housing Stability     Housing Instability: Patient declined       Family History   Problem Relation Age of Onset    Cancer Brother          Exam:  Vitals:    24 0115 24 0402 24 0528 24 0739   BP:  139/62 134/50 128/60   BP Location:  Left arm  Left arm   Pulse: 97 100 104 82   Resp:  20  20   Temp:  98.5 °F (36.9 °C)  97.8 °F (36.6 °C)   TempSrc:  Oral  Oral   SpO2: 95% 95% 92% 94%   Weight:       Height:         General: no apparent distress, conversant  Skin: no rash, ulcers or subcutaneous nodules  Eyes: anicteric sclerae, moist conjunctivae  Head, ears, nose, throat: atraumatic, oropharynx clear with moist mucous membranes  Neck: trachea midline with no thyromegaly  Heart: regular rate and rhythm, no murmurs / rubs / gallops  Lungs: bibasilar crackles  Extremities: no edema or cyanosis  Psych: interactive, answering questions appropriately, appropriate affect    Labs:  Reviewed in EMR    Inpatient Medications:  Reviewed in EMR    Imaging:   Chest imaging reviewed

## 2024-03-06 NOTE — SPIRITUAL CARE NOTE
Spiritual Care Visit Note    Patient Name: Mahsa Zhang Date of Spiritual Care Visit: 24   : 1935 Primary Dx: Urinary tract infection without hematuria, site unspecified       Referred By: Referral From: Other (Comment)    Spiritual Care Taxonomy:    Intended Effects: Promote a sense of peace    Methods: Offer support    Interventions: Acknowledge response to difficult experience;Explain  role    Visit Type/Summary:     - Spiritual Care: Consulted with RN prior to visit. Patient and family expressed appreciation for  visit.  remains available as needed for follow up.    Spiritual Care support can be requested via an Epic consult. For urgent/immediate needs, please contact the On Call  at: [facility]: ext [phone]

## 2024-03-06 NOTE — PROGRESS NOTES
Infectious Disease Progress Note      Date of admission: 2/26/2024 12:34 PM     Reason for consult: Sepsis, UTI    Subjective: Feels better compared to last night.  More awake.  No abdominal pain.  No nausea vomiting.  No diarrhea.  No shortness of breath.  No cough or sputum production.    The rest of the systems were reviewed and found to be negative except was mentioned above    Interval events: This is an 88-year-old male patient presenting here with sepsis, fevers chills and flank pain due to what appears to be a left-sided pyelonephritis.  The patient also had bilateral thoracentesis done due to pleural effusions, both of which are consistent with transudative fluids.  Blood cultures with no growth to date.  TTE without vegetations.  The patient was treated initially with IV Zosyn; however, developed volume overload and due to concerns of the high sodium load with IV Zosyn, he was de-escalated to IV ceftriaxone as he did not grow any MDRO's.  He was supposed to finish ceftriaxone on 3/6.  However, on 3/5, he spiked a fever with a Tmax of 38.7 °C.  As such, 2 sets of blood cultures were obtained, the patient was escalated back to IV meropenem and IV vancomycin.  Lactic acid was elevated at 3.7.  His procalcitonin was also elevated 1.64.  MRSA screen at this point was negative.    Medications:    meropenem    dilTIAZem HCl ER    metoclopramide    furosemide    insulin degludec    rivaroxaban    metoprolol    dilTIAZem    multivitamin with minerals    ipratropium-albuterol    acetaminophen **OR** acetaminophen **OR** acetaminophen    glucose **OR** glucose **OR** glucose-vitamin C **OR** dextrose **OR** glucose **OR** glucose **OR** glucose-vitamin C    insulin aspart    polyethylene glycol (PEG 3350)    sennosides    bisacodyl    fleet enema    ondansetron    [Held by provider] atorvastatin    levothyroxine     Allergies:  No Known Allergies    Physical Exam:  Vitals:    03/06/24 0739   BP: 128/60   Pulse: 82    Resp: 20   Temp: 97.8 °F (36.6 °C)     Vitals signs and nursing note reviewed.   Constitutional:       Appearance: Lethargic appearance.   HENT:      Head: Normocephalic and atraumatic.      Mouth: Mucous membranes are moist.   Neck:      Musculoskeletal: Neck supple.   Cardiovascular:      Rate and Rhythm: Normal rate.      Heart sounds: Normal heart sounds. No murmur. No friction rub. No gallop.    Pulmonary:      Effort: Pulmonary effort is normal. No respiratory distress.      Breath sounds: Normal breath sounds. No stridor. No wheezing, rhonchi or rales.   Chest:      Chest wall: No tenderness.   Abdominal:      General: Abdomen is flat. There is no distension.      Palpations: Abdomen is soft. There is no mass.      Tenderness: There is no tenderness. There is no guarding or rebound.      Hernia: No hernia is present.   Musculoskeletal:      Right lower leg: No edema.      Left lower leg: No edema.   Skin:     General: Skin is warm and dry.   Neurological:      General: No focal deficit present.      Mental Status: Pleasantly confused, lethargic      Laboratory data:  I have reviewed all the lab results independently.  Lab Results   Component Value Date    WBC 12.5 03/06/2024    HGB 11.4 03/06/2024    HCT 33.7 03/06/2024    .0 03/06/2024    CREATSERUM 1.28 03/06/2024    BUN 31 03/06/2024     03/06/2024    K 4.1 03/06/2024     03/06/2024    CO2 19.0 03/06/2024     03/06/2024    CA 10.9 03/06/2024    ALB 1.3 03/06/2024    ALKPHO 555 03/06/2024    BILT 1.9 03/06/2024    TP 4.9 03/06/2024    AST 82 03/06/2024    ALT 88 03/06/2024      Recent Labs   Lab 03/05/24  0411 03/06/24  0646   RBC 5.46 5.00   HGB 12.4* 11.4*   HCT 38.1* 33.7*   MCV 69.8* 67.4*   MCH 22.7* 22.8*   MCHC 32.5 33.8   RDW 18.6 17.2   NEPRELIM 10.98*  --    WBC 14.5* 12.5*   .0 201.0      Microbiology data:  Hospital Encounter on 02/26/24   1. Urine Culture, Routine     Status: None    Collection Time: 03/03/24  11:13 AM    Specimen: Urine, clean catch   Result Value Ref Range    Urine Culture No Growth at 18-24 hrs. N/A   2. Blood Culture     Status: None (Preliminary result)    Collection Time: 03/02/24  2:06 PM    Specimen: Blood,peripheral   Result Value Ref Range    Blood Culture Result No Growth 3 Days N/A   3. Body Fluid Cult Aerobic and Anaerobic     Status: None (Preliminary result)    Collection Time: 03/01/24 10:30 PM    Specimen: Pleural Fluid, Left; Body fluid, unspecified   Result Value Ref Range    Body Fluid Culture Result No Growth 3 Days N/A    Body Fluid Smear 1+ WBCs seen N/A    Body Fluid Smear No organisms seen N/A    Body Fluid Smear This is a cytocentrifuged smear. N/A        Radiology:  I have reviewed all imagining data available independently.   Chest x-ray on 3/4:  Pulmonary edema, mild improvement noted    Impression:  Mahsa Zhang is a 88 year old male with    New onset fever in the setting of acute left-sided pyelonephritis  Was on ceftriaxone  All cultures remain negative to date  Repeat blood cultures negative so far  MRSA screen is negative  Escalated to IV meropenem and IV vancomycin last night after discussion with infectious disease  LFTs on the rise, highly concerning for biliary process given elevated alk phos of 555  Acute left-sided pyelonephritis, with question of infarcts  Blood cultures with no growth to date  Urine culture with no growth to date  Currently on IV meropenem  As per the above  EBV and CMV serologies consistent with remote infection  Question of pneumonia seen on his CT chest  Could be also related to his effusions, compressive atelectasis    Recommendations:    Continue to follow-up on blood cultures  Check UA along with reflex urine culture  Check CT abdomen and pelvis with contrast  Continue to trend LFTs  Discontinue IV vancomycin given the negative MRSA screen  Continue IV meropenem for now  Okay for midline placement for IV access if needed to  Continue  monitor daily labs for antibiotic toxicity  Further recommendations will depend on the above workup and clinical progress    The plan of care was discussed with the primary hospital team, Holden Avelar MD     Recommendations were also discussed with the patient; all questions were answered.     Thank you for this consultation. Please don't hesitate to call the ID team for questions or any acute changes in patient's clinical condition.    Please note that this report has been produced using speech recognition software and may contain errors related to that system including, but not limited to, errors in grammar, punctuation, and spelling, as well as words and phrases that possibly may have been recognized inappropriately.  If there are any questions or concerns, contact the dictating provider for clarification.    The 21st Century Cures Act makes medical notes like these available to patients in the interest of transparency. Please be advised this is a medical document. Medical documents are intended to carry relevant information, facts as evident, and the clinical opinion of the practitioner. The medical note is intended as peer to peer communication and may appear blunt or direct. It is written in medical language and may contain abbreviations or verbiage that are unfamiliar.     Becki Gonzales MD  [facility]. Tel: [phone]. Fax: [phone].     Mahsa Zhang : 1935 MRN: [id number] [facility]: [id number]

## 2024-03-06 NOTE — PROGRESS NOTES
@ 2006 Hospitalist paged.   Sepsis Advisory notice. HR 130s, Febrile 101.7, RR elevated, 02 sat stable on 6L HF. Denied pain.   Acetaminophen given.   Orders: Repeat bcx, urinalysis with cx, Lactic, repeat CXR, and probnp.   IV Vancomycin and IV merrem started     2155: Fever improved, HR improved. Afib on tele 90-100s.     Patient is Ax02. Family at bedside translating, , weaned to 5L. Checking temperature Denied pain.. Incontinent. Primofit. Regular diet. Upper bilateral upper extremities edema. Elevated.  Resting in bed.  Labs relayed., IV ABX, MRSA swab and Urinalysis sent.  Monitoring lactic. Probnp elevated. Hold fluids for now. CXR done.

## 2024-03-06 NOTE — PROGRESS NOTES
[facility]   part of [facility]    Progress Note     Mahsa Zhang Patient Status:  Inpatient    1935 MRN [id number]   Location [facility] 4SW-A Attending Holden Avelar MD   Hosp Day # 9 PCP Agus Martinez MD     Follow up for: The primary encounter diagnosis was Urinary tract infection without hematuria, site unspecified. Diagnoses of Dehydration, Elevated liver enzymes, Fever, unspecified fever cause, and Lactic acid acidosis were also pertinent to this visit.      Interval History/Subjective:     Febrile 101.7 yesterday at 8pmm, HR 130s,   O2 requirements improved 5-6LPM  Lactate 3.7->3.5->3.7, pBNP 39465     No new or worsening symptoms, more alert today. Tolerating diet; Son concerned with frequency of blood draws     Vital signs:  Temp:  [97.8 °F (36.6 °C)-101.7 °F (38.7 °C)] 97.8 °F (36.6 °C)  Pulse:  [] 82  Resp:  [20-32] 20  BP: (116-163)/(50-88) 128/60  SpO2:  [92 %-96 %] 94 %    Physical Exam:    General: NAD, Comfortable, Nontoxic though chronically ill appearing   Respiratory: CTAB; reg resp rate & effort, no wheezes/crackles  Cardiovascular: S1, S2. IRIR, rates   on monitor; No murmurs appreciated  Abdomen: Soft, NTND, no guarding/rebound   Neurologic: No focal neurological deficits.   Extremities: No LIZ: bilateral nonpitting upper extremity roxie  Skin: Dry, no rashes, ulcers or lesions     Diagnostic Data:      Labs:  Recent Labs   Lab 24  0356 24  0540 24  0435 24  0431 24  0442 24  0411 24  0646   WBC 5.2 5.0 4.7 5.9 10.5 14.5* 12.5*   HGB 10.8* 11.7* 11.8* 10.7* 11.2* 12.4* 11.4*   MCV 71.4* 69.2* 67.9* 69.0* 68.0* 69.8* 67.4*   .0 200.0 170.0 188.0 194.0 228.0 201.0   INR 1.97* 1.49* 1.43* 1.40*  --   --   --        Recent Labs   Lab 24  0442 24  0411 24  0646   * 176* 146*   BUN 30* 32* 31*   CREATSERUM 1.37* 1.45* 1.28   CA 9.6 10.0 10.9*   ALB 1.5* 1.6* 1.3*   * 127* 130*   K 4.0  4.2 4.1    101 103   CO2 22.0 21.0 19.0*   ALKPHO 453* 585* 555*   AST 86* 82* 82*   * 110* 88*   BILT 1.6 1.7 1.9   TP 5.2* 5.4* 4.9*       Recent Labs   Lab 03/01/24  0540 03/02/24  0435 03/03/24  0431   PTP 18.2* 17.5* 17.2*   INR 1.49* 1.43* 1.40*       No results for input(s): \"TROP\", \"CK\" in the last 168 hours.         Imaging: Imaging data reviewed in Epic.    Medications:    meropenem  500 mg Intravenous Q12H    dilTIAZem HCl ER  240 mg Oral Daily    furosemide  20 mg Intravenous Daily    insulin degludec  5 Units Subcutaneous Daily    rivaroxaban  15 mg Oral Daily with food    multivitamin with minerals  1 tablet Oral Daily    insulin aspart  1-5 Units Subcutaneous TID AC and HS    [Held by provider] atorvastatin  40 mg Oral Nightly    levothyroxine  25 mcg Oral Before breakfast       ASSESSMENT / PLAN:     Mahsa Zhang Is a a 88 year old male who presented with fever/chills, generalized weakness, found to have septic shock secondary to pyelonephritis    Problem List / Diagnoses    Septic shock-resolved  Acute pyelonephritis  Acute respiratory failure with hypoxia  Bilateral pleural effusions  Acute on chronic HFpEF  A-fib with RVR  Hypothyroidism  DM2  Dysphagia  Moderate to severe protein calorie malnutrition    Plan    Drowsiness -- resolved   -- pt protecting airway, more alert today, following commands and participating with PT   -- consider ABG if he becomes drowsy again, suspect previous encephalopathy was related to infection     Septic shock- resolved  Elevated lactate- resolved  Likely secondary to acute pyelonephritis, right  Recurrent Fevers and chills  - Lactate elevated, repeat now clear  - Broad-spectrum antibiotics, cont IV Zosyn, dc IV vancomycin  - CT abdomen pelvis suggestive of acute pyelonephritis, UA grossly abnormal  - Blood cultures x 2 NGTD, urine cultures reviewed, no significant resistance  - Afebrile since 3/2/24, monitor   - ID on consult, case discussed,  deescalate antibiotics to ceftriaxone 3/4, EOT 3/6/24  -- 3/6/24: recurrent fever+rigors, repeat cultures drawn and pending. D/w ID,  abx broadened to meropenem   -- 3/7: ALP elevated, check CT Abd to evaluate for biliary pathology     Acute hypoxic respiratory failure -- improving   Pleural effusion bilaterally  - Status post thoracentesis on the right 2/29  -Status post left thoracentesis 3/1  - Pleural fluid analysis suggestive of transudative fluid, concern for heart failure (below)   --off BiPAP, now on high flow nasal cannula, weaning O2 as able  - Sputum cultures obtained  - Pulmonology following, recommendations reviewed     Hypothyroidism  - Continue levothyroxine     Type 2 diabetes with hyperglycemia  - ISS, Accu-Cheks     Arrhythmia  Bradycardia status post pacemaker  A-fib with RVR --unimproved   - On Xarelto for AC  - Started on diltiazem drip, continue 3/4  - Continue p.o. diltiazem/metoprolol, titration per cardiology  -- is on iv BB prn for uncontrolled rates      Suspect acute on chronic diastolic heart failure-improving  - Continue diuresis per cardiology  - 2D echo- reviewed  - Telemetry     Dysphagia  - History of esophageal stricture that was dilated few months ago  - Recently treated with esophageal candidiasis  - Still unable to tolerate solids  - Speech evaluation-passed  - GI on consult-family deferring EGD at this time.  Will focus on acute issues for now     Elevated liver enzymes-improving  - Possibly from shock?  - Ultrasound liver- with small ascites  - trend liver enzymes,  - Workup per GI, recommendations reviewed-GI now signed off     Protein calorie malnutrition, moderate to severe  - Poor caloric intake  - Patient/family not willing to consider tube feeds at this time  - Dietitian on consult for calorie count     Advance care planning  - POA: Son  - CODE STATUS: DNR/select-discussed with patient and his family at bedside and on presentation       DVT Mechanical Prophylaxis:    SCDs,    DVT Pharmacologic Prophylaxis   Medication    rivaroxaban (Xarelto) tab 15 mg              Code Status: DNAR/Selective Treatment    Dispo: Stable in ICU; DELMI >2 midnights    Plan of care discussed with patient and/or family at bedside.    MARIAJOSE Avelar MD  [facility]   [phone]      This note was created using voice recognition technology. It may include inadvertent transcriptional errors. Any such errors should be contextually interpreted and should not be taken to alter the content or the meaning.     Note to Patient: The 21st Century Cures Act makes medical notes like these available to patients in the interest of transparency. However, be advised this is a medical document. It is intended as peer to peer communication. It is written in medical language and may contain abbreviations or verbiage that are unfamiliar. It may appear blunt or direct. Medical documents are intended to carry relevant information, facts as evident, and the clinical opinion of the practitioner and not intended to be the primary source of your information.  Please refer directly to myself or clinical staff for information regarding plan of care.

## 2024-03-06 NOTE — PLAN OF CARE
3/6 AM: Pt is A/O x 2-3, Jimmy speaking. Lung sounds are diminished, room air is baseline. BP hypertensive, controlled Afib on tele. Primofit in place. Chest CPT PRN. QID Accucheck. Takes one pill at a time    PT/OT tried to work with patient, not able to participate well.   PRN Cardizem given for HR sustaining 120+    Problem: Diabetes/Glucose Control  Goal: Glucose maintained within prescribed range  Description: INTERVENTIONS:  - Monitor Blood Glucose as ordered  - Assess for signs and symptoms of hyperglycemia and hypoglycemia  - Administer ordered medications to maintain glucose within target range  - Assess barriers to adequate nutritional intake and initiate nutrition consult as needed  - Instruct patient on self management of diabetes  Outcome: Progressing

## 2024-03-06 NOTE — PHYSICAL THERAPY NOTE
PHYSICAL THERAPY TREATMENT NOTE - INPATIENT    Room Number: 519     Session:2    Number of Visits to Meet Established Goals: 3    Presenting Problem: UTI  Co-Morbidities : h/o HTN, TIA, DM2    ASSESSMENT   Patient demonstrates limited progress this session, goals  remain in progress.    Patient continues to function below baseline with bed mobility, transfers, and gait.  Contributing factors to remaining limitations include decreased functional strength and decreased endurance/aerobic capacity.  Next session anticipate patient to progress bed mobility, transfers, and gait.  Physical Therapy will continue to follow patient for duration of hospitalization.    Patient continues to benefit from continued skilled PT services  PLAN  PT Treatment Plan: Bed mobility;Body mechanics;Endurance;Energy conservation;Patient education;Family education;Gait training;Range of motion;Stoop training;Stair training;Transfer training;Balance training  Rehab Potential : Good  Frequency (Obs):  (2-3x/week)    CURRENT GOALS     Goal #1 Patient is able to demonstrate supine - sit EOB @ level: supervision      Goal #2 Patient is able to demonstrate transfers Sit to/from Stand at assistance level: supervision      Goal #3 Patient is able to ambulate 50 feet with assist device: walker - rolling at assistance level: supervision      Goal #4     Goal #5     Goal #6     Goal Comments: Goals established on 2/28/2024, ongoing 3/6/24.    SUBJECTIVE  Pt more alert this session, son present to translate    OBJECTIVE  Precautions: Bed/chair alarm; needed    WEIGHT BEARING RESTRICTION  Weight Bearing Restriction: None                PAIN ASSESSMENT   Rating: Unable to rate  Location: RLE  Management Techniques: Activity promotion;Body mechanics;Breathing techniques;Relaxation;Repositioning    BALANCE                                                                                                                       Static Sitting: Fair  -  Dynamic Sitting: Poor +           Static Standing: Dependent  Dynamic Standing: Not tested    ACTIVITY TOLERANCE                         O2 WALK         AM-PAC '6-Clicks' INPATIENT SHORT FORM - BASIC MOBILITY  How much difficulty does the patient currently have...  Patient Difficulty: Turning over in bed (including adjusting bedclothes, sheets and blankets)?: A Lot   Patient Difficulty: Sitting down on and standing up from a chair with arms (e.g., wheelchair, bedside commode, etc.): Unable   Patient Difficulty: Moving from lying on back to sitting on the side of the bed?: A Lot   How much help from another person does the patient currently need...   Help from Another: Moving to and from a bed to a chair (including a wheelchair)?: Total   Help from Another: Need to walk in hospital room?: Total   Help from Another: Climbing 3-5 steps with a railing?: Total       AM-PAC Score:  Raw Score: 8   Approx Degree of Impairment: 86.62%   Standardized Score (AM-PAC Scale): 28.58   CMS Modifier (G-Code): CM    FUNCTIONAL ABILITY STATUS  Gait Assessment   Functional Mobility/Gait Assessment  Gait Assistance: Not tested  Distance (ft): 0  Assistive Device: Rolling walker  Pattern: Shuffle    Skilled Therapy Provided  RN consulted prior to session  Pt presents in semi sup  Pt cued for sitting EOB, requires A x 2 and demos pain behavior c RLE mobility   Pt progressed to fair- seated bal EOB   Requires repeated cues for AROM BLE BUE  Pt attempted STS 4x c max A  x 2, unable to take side steps or safely weight shift,  Pt is increased risk for falls, pt left in chair position in bed.   Of note, pt would be good candidate to trial sera steady, however, pt's chair in room does not accommodate   Pt would benefit from w/c, BS commode and lift equipment at home         Bed Mobility:  Rolling: min A  Supine<>Sit: mod A x 2.    Sit<>Supine: mod x 2    Transfer Mobility:  Sit<>Stand: max  x 2   Stand<>Sit:  Min x 2   Gait: NT   Patient End  of Session: In bed;Needs met;RN aware of session/findings;Call light within reach;All patient questions and concerns addressed;Family present;Alarm set    PT Session Time: 23 minutes    Therapeutic Activity: 23 minutes

## 2024-03-06 NOTE — PROGRESS NOTES
Brief Cross Cover Note    S: Paged by RN that pt febrile & w some resp sx & rigors. Pt w/o fevers for last few days, recent de-escalation of abx  O: T 101.7, HR 130s - A. Fib RVR, RR 20s, 6L O2  A/P  Severe sepsis  Check lactate  Tylenol given  Per d/w RN, pt volume overloaded - BNP 2200 on admission, cardiology diuresing. Hold on IVF for now. Will see if any improvement in HR after tylenol  Check BNP  Repeat IFN w/u: Blood cx, CXR, UA, procal, COVID/Flu swab, MRSA nares  Broaden abx - merrem/vanco  ID note reviewed - Dr. Gonzales s/o. Will notify on-call ID & ask for their assistance in AM    CB

## 2024-03-06 NOTE — OCCUPATIONAL THERAPY NOTE
OCCUPATIONAL THERAPY TREATMENT NOTE - INPATIENT     Room Number: 519/519-A  Session: 1   Number of Visits to Meet Established Goals: 3    Presenting Problem: UTI, sepsis, dehydration, fever  Prior Level of Function: Pt lives at home with his spouse and children who provide 24/7 care. Pt is typically mod I with BADL using a RW.     ASSESSMENT   Patient demonstrates limited progress this session, goals remain in progress.    Patient continues to function below baseline with toileting, lower body dressing, bed mobility, transfers, stating sitting balance, dynamic sitting balance, static standing balance, dynamic standing balance, maintaining seated position, and functional standing tolerance.   Contributing factors to remaining limitations include decreased functional strength, decreased endurance, pain, impaired sitting/standing balance, impaired coordination, impaired motor planning, and decreased muscular endurance.  Next session anticipate patient to progress bed mobility, transfers, stating sitting balance, dynamic sitting balance, static standing balance, dynamic standing balance, maintaining seated position, and functional standing tolerance.  Occupational Therapy will continue to follow patient for duration of hospitalization.    Patient continues to benefit from continued skilled OT services.          OT Device Recommendations: TBD    History: Patient is a 88 year old male admitted on 2/26/2024 with Presenting Problem: UTI, sepsis, dehydration, fever. Co-Morbidities : h/o HTN, TIA, DM2    WEIGHT BEARING RESTRICTION  Weight Bearing Restriction: None                TREATMENT SESSION:  Patient Start of Session: semi supine in bed; son present  FUNCTIONAL TRANSFER ASSESSMENT  Sit to Stand: Edge of Bed  Edge of Bed: Dependent (max A x 2)    BED MOBILITY  Supine to Sit : Maximum Assist (mod A x 2)  Sit to Supine (OT): Maximum Assist (mod A x 2)    BALANCE ASSESSMENT  Static Sitting: Minimal Assist  Static Standing:  Maximum Assist    FUNCTIONAL ADL ASSESSMENT  LB Dressing Seated: Not Tested      ACTIVITY TOLERANCE: WFL                         O2 SATURATIONS       EDUCATION PROVIDED  Patient: Role of Occupational Therapy; Plan of Care; Discharge Recommendations; Functional Transfer Techniques; Fall Prevention; Posture/Positioning; Energy Conservation; Proper Body Mechanics  Patient's Response to Education: Verbalized Understanding; Requires Further Education  Family/Caregiver: Role of Occupational Therapy; Plan of Care; Discharge Recommendations; Functional Transfer Techniques; Fall Prevention  Family/Caregiver's Response to Education: Verbalized Understanding      Equipment used: RW  Demonstrates functional use, Would benefit from additional trial      Therapist comments: Pt encouraged to participate in EOB activity. Son assisted with translating to maximize participation. Pt performs bed mobility at mod A x 2 to sit EOB with cues provided for hand placement and sequencing. Pt engages in multiple bouts of sit to stand transfers with pt requiring max A x 2 and max cues for hand placement, posture/positioning, and sequencing. Pt unable to stand upright and unable to tolerate standing for long in order to complete side steps to chair due to increasing pain in B LE. Pt returns to supine at mod A x 2 and requires 2 person assist to reposition to HOB.    Patient End of Session: In bed;Needs met;Call light within reach;RN aware of session/findings;All patient questions and concerns addressed;Alarm set;Family present    SUBJECTIVE  Son present to assist with translation.     PAIN ASSESSMENT  Ratin  Location: B LE        OBJECTIVE  Precautions: Bed/chair alarm; needed    AM-PAC ‘6-Clicks’ Inpatient Daily Activity Short Form  -   Putting on and taking off regular lower body clothing?: A Lot  -   Bathing (including washing, rinsing, drying)?: A Lot  -   Toileting, which includes using toilet, bedpan or urinal? : A Lot  -    Putting on and taking off regular upper body clothing?: A Little  -   Taking care of personal grooming such as brushing teeth?: A Little  -   Eating meals?: None    AM-PAC Score:  Score: 16  Approx Degree of Impairment: 53.32%  Standardized Score (AM-PAC Scale): 35.96    PLAN  OT Treatment Plan: Balance activities;Energy conservation/work simplification techniques;ADL training;Functional transfer training;Endurance training;Patient/Family education;Patient/Family training;Equipment eval/education;Compensatory technique education;Continued evaluation  Rehab Potential : Fair  Frequency: 3x/week    OT Goals:     All goals ongoing 03/06    ADL Goals  Patient will perform toileting with supervision and AE PRN  Patient will perform LB dressing with supervision and AE PRN     Functional Transfer Goals  Patient will perform bed mobility supine to sit with supervision  Patient will perform bed mobility sit to supine with supervision  Patient will perform toilet transfer with supervision     Additional Goals:  Patient will state precautions and maintain during ADL    OT Session Time: 25 minutes  Therapeutic Activity: 25 minutes

## 2024-03-06 NOTE — PLAN OF CARE
Problem: Diabetes/Glucose Control  Goal: Glucose maintained within prescribed range  Description: INTERVENTIONS:  - Monitor Blood Glucose as ordered  - Assess for signs and symptoms of hyperglycemia and hypoglycemia  - Administer ordered medications to maintain glucose within target range  - Assess barriers to adequate nutritional intake and initiate nutrition consult as needed  - Instruct patient on self management of diabetes  Outcome: Progressing     Problem: Patient/Family Goals  Goal: Patient/Family Long Term Goal  Description: Patient's Long Term Goal: Go home    Interventions:  - cooperate with PT  - sit in the chair during the day  - employ anxiety coping mechanisms    - See additional Care Plan goals for specific interventions  Outcome: Progressing  Goal: Patient/Family Short Term Goal  Description: Patient's Short Term Goal: have HR <120  3/05: reduce fever      Interventions:   - cardizem drip changing to PO  - oxygen/BiPAP prn  - sit up in chair during the day    - See additional Care Plan goals for specific interventions  Outcome: Progressing     Problem: CARDIOVASCULAR - ADULT  Goal: Absence of cardiac arrhythmias or at baseline  Description: INTERVENTIONS:  - Continuous cardiac monitoring, monitor vital signs, obtain 12 lead EKG if indicated  - Evaluate effectiveness of antiarrhythmic and heart rate control medications as ordered  - Initiate emergency measures for life threatening arrhythmias  - Monitor electrolytes and administer replacement therapy as ordered  Outcome: Progressing     Problem: METABOLIC/FLUID AND ELECTROLYTES - ADULT  Goal: Electrolytes maintained within normal limits  Description: INTERVENTIONS:  - Monitor labs and rhythm and assess patient for signs and symptoms of electrolyte imbalances  - Administer electrolyte replacement as ordered  - Monitor response to electrolyte replacements, including rhythm and repeat lab results as appropriate  - Fluid restriction as ordered  -  Instruct patient on fluid and nutrition restrictions as appropriate  Outcome: Progressing     Problem: MUSCULOSKELETAL - ADULT  Goal: Return mobility to safest level of function  Description: INTERVENTIONS:  - Assess patient stability and activity tolerance for standing, transferring and ambulating w/ or w/o assistive devices  - Assist with transfers and ambulation using safe patient handling equipment as needed  - Ensure adequate protection for wounds/incisions during mobilization  - Obtain PT/OT consults as needed  - Advance activity as appropriate  - Communicate ordered activity level and limitations with patient/family  Outcome: Progressing     Problem: Impaired Activities of Daily Living  Goal: Achieve highest/safest level of independence in self care  Description: Interventions:  - Assess ability and encourage patient to participate in ADLs to maximize function  - Promote sitting position while performing ADLs such as feeding, grooming, and bathing  - Educate and encourage patient/family in tolerated functional activity level and precautions during self-care  - Provide support under elbow of weak side to prevent shoulder subluxation  Outcome: Progressing     Problem: RESPIRATORY - ADULT  Goal: Achieves optimal ventilation and oxygenation  Description: INTERVENTIONS:  - Assess for changes in respiratory status  - Assess for changes in mentation and behavior  - Position to facilitate oxygenation and minimize respiratory effort  - Oxygen supplementation based on oxygen saturation or ABGs  - Provide Smoking Cessation handout, if applicable  - Encourage broncho-pulmonary hygiene including cough, deep breathe, Incentive Spirometry  - Assess the need for suctioning and perform as needed  - Assess and instruct to report SOB or any respiratory difficulty  - Respiratory Therapy support as indicated  - Manage/alleviate anxiety  - Monitor for signs/symptoms of CO2 retention  Outcome: Progressing     Problem: SKIN/TISSUE  INTEGRITY - ADULT  Goal: Incision(s), wounds(s) or drain site(s) healing without S/S of infection  Description: INTERVENTIONS:  - Assess and document risk factors for pressure ulcer development  - Assess and document skin integrity  - Assess and document dressing/incision, wound bed, drain sites and surrounding tissue  - Implement wound care per orders  - Initiate isolation precautions as appropriate  - Initiate Pressure Ulcer prevention bundle as indicated  Outcome: Progressing     Problem: Delirium  Goal: Minimize duration of delirium  Description: Interventions:  - Encourage use of hearing aids, eye glasses  - Promote highest level of mobility daily  - Provide frequent reorientation  - Promote wakefulness i.e. lights on, blinds open  - Promote sleep, encourage patient's normal rest cycle i.e. lights off, TV off, minimize noise and interruptions  - Encourage family to assist in orientation and promotion of home routines  Outcome: Progressing

## 2024-03-06 NOTE — CM/SW NOTE
Met with patient's son ([place]) regarding discharge planning. He shared that patient/wife lives with him at home. Patient is normally able to ambulate independently, uses a RW at times. They prepare meals for him that he heats up. Discussed discharge options such as HH with family support and/or hiring caregiver vs [facility] placement. He prefers to take him home but if he is requiring too much assistance would consider [facility]. Explained [facility], he would only want a higher Medicare rated facility and a private room. Provided [facility] contact information. Discussed plan to follow clinical course for further discharge planning.    Sent to [facility] for review.    Started referral for [facility]. Will need PAS[facility] and insurance auth if going to [facility].    SW/CM to remain available for support and/or discharge planning.    DANYELL Santoyo  Discharge Planner  [phone]

## 2024-03-06 NOTE — PROGRESS NOTES
[facility]  Progress Note    Mahsa Zhang Patient Status:  Inpatient    1935 MRN [id number]   Location [facility] 4SW-A Attending Veronica Hood,    Hosp Day # 9 PCP Agus Martinez MD     Subjective:   Appears comfortable.  Family at bedside to translate.  No chest pain. No abdominal pain.  No new focal cardiovascular complaints reported.    Objective:  Vitals:    24 0115 24 0402 24 0528 24 0739   BP:  139/62 134/50 128/60   BP Location:  Left arm  Left arm   Pulse: 97 100 104 82   Resp:  20  20   Temp:  98.5 °F (36.9 °C)  97.8 °F (36.6 °C)   TempSrc:  Oral  Oral   SpO2: 95% 95% 92% 94%   Weight:       Height:           Temp (24hrs), Av.1 °F (37.3 °C), Min:97.8 °F (36.6 °C), Max:101.7 °F (38.7 °C)      Medications:   Scheduled:    meropenem  500 mg Intravenous Q12H    dilTIAZem HCl ER  240 mg Oral Daily    furosemide  20 mg Intravenous Daily    insulin degludec  5 Units Subcutaneous Daily    rivaroxaban  15 mg Oral Daily with food    multivitamin with minerals  1 tablet Oral Daily    insulin aspart  1-5 Units Subcutaneous TID AC and HS    [Held by provider] atorvastatin  40 mg Oral Nightly    levothyroxine  25 mcg Oral Before breakfast       Continuous Infusion:         PRN Medications:   metoclopramide, metoprolol, dilTIAZem, ipratropium-albuterol, acetaminophen **OR** acetaminophen **OR** acetaminophen, glucose **OR** glucose **OR** glucose-vitamin C **OR** dextrose **OR** glucose **OR** glucose **OR** glucose-vitamin C, polyethylene glycol (PEG 3350), sennosides, bisacodyl, fleet enema, ondansetron    Intake/Output:     Intake/Output Summary (Last 24 hours) at 3/6/2024 1012  Last data filed at 3/6/2024 0528  Gross per 24 hour   Intake 530 ml   Output 700 ml   Net -170 ml       Wt Readings from Last 3 Encounters:   24 161 lb 13.1 oz (73.4 kg)   23 144 lb 3.2 oz (65.4 kg)   23 179 lb 7.3 oz (81.4 kg)       Allergies:  No Known Allergies    Physical  Exam:   General:  Well-developed / Well-nourished.  No acute distress.  HEENT:  Normocephalic.  Atraumatic.  No icterus. Dry appearing mucous membranes.    Neck:  There is no jugular venous distention.   Cardiovascular:  Cardiovascular examination demonstrates an irregular rate and rhythm.  There is normal S1, S2.  There is no S3 or S4.  There are no murmurs, rubs, or gallops.  No click is appreciated.  PMI is nondisplaced with a normal apical impulse.    Pulmonary:  Lungs are coarse on auscultation bilaterally.  There are no focal rales, rhonchi, or wheezes.  Good air movement is noted throughout both lung fields.   Abdomen:  The abdomen is soft, non-distended, and non-tender.  Bowel sounds are present and normoactive.  No organomegaly is appreciated.  Extremities:  Extremities do not demonstrate any evidence of peripheral edema.   No cyanosis or clubbing of the digits is appreciated.  Neurologic:  Alert and interactive.  Integument:  No visible rashes are appreciated.      Laboratory/Data:   Recent Labs   Lab 03/04/24 0442 03/05/24 0411 03/06/24  0646   * 176* 146*   BUN 30* 32* 31*   CREATSERUM 1.37* 1.45* 1.28   CA 9.6 10.0 10.9*   ALB 1.5* 1.6* 1.3*   * 127* 130*   K 4.0 4.2 4.1    101 103   CO2 22.0 21.0 19.0*   ALKPHO 453* 585* 555*   AST 86* 82* 82*   * 110* 88*   BILT 1.6 1.7 1.9   TP 5.2* 5.4* 4.9*       Recent Labs   Lab 03/03/24  0431 03/04/24 0442 03/05/24 0411 03/06/24  0646   RBC 4.74 4.91 5.46 5.00   HGB 10.7* 11.2* 12.4* 11.4*   HCT 32.7* 33.4* 38.1* 33.7*   MCV 69.0* 68.0* 69.8* 67.4*   MCH 22.6* 22.8* 22.7* 22.8*   MCHC 32.7 33.5 32.5 33.8   RDW 17.0 17.3 18.6 17.2   NEPRELIM 4.75 8.73* 10.98*  --    WBC 5.9 10.5 14.5* 12.5*   .0 194.0 228.0 201.0       Recent Labs   Lab 03/01/24  0540 03/02/24  0435 03/03/24  0431   PTP 18.2* 17.5* 17.2*   INR 1.49* 1.43* 1.40*       No results for input(s): \"TROP\", \"CK\" in the last 168 hours.      Tele: AF - rate  controlled    Diagnostics:    Echo:   Conclusions:     1. Left ventricle: The cavity size was normal. Wall thickness was at the      upper limits of normal. Systolic function was normal. The estimated      ejection fraction was 50-55%, by visual assessment. No diagnostic      evidence for regional wall motion abnormalities. Unable to assess LV      diastolic function due to heart rhythm.   2. Right ventricle: The cavity size was increased. Pacer wire noted in the      right ventricle. The RV pressure during systole is 56mm Hg.   3. Left atrium: The left atrial volume was mildly increased.   4. Aortic valve: There was mild regurgitation.   5. Tricuspid valve: There was moderate regurgitation.   6. Ascending aorta: The ascending aorta was 3.9cm diameter.       Assessment:     Atrial Fibrillation  -Rate controlled on PO CCB  -On Xarelto  2.     SSS                -S/P PPM  3.    Sepsis                -Pyelonephritis                -PNA  4.    HTN Hx  5.    Hypothyroidism  6.    DM  7.    Anemia  8.    Coagulopathy  9.    Elevated LFTs   -Improving  10.  NL EF  11.  Pulmonary HTN  12.  Pleural effusion                -S/P Thora  13.  HFpEF  14.  Respiratory insufficiency   -Component of CHF   -ARDS suspected   -On O2  15.  Mild RI  16.  Increasing WBC  17.  Hyponatremia    Plan:    PO CCB rate control   IV BB PRN if rates inadequately controlled on IV CCB with fevers   IV Lasix - monitor volume status / renal function closely   O2 NC support PRN   ATBx per ID   Tele monitor   F/U GABRIELLE Duong MD  3/6/2024

## 2024-03-07 NOTE — VASCULAR ACCESS
VAT consulted for Midline placement. Midline ok per ID. Upon assessment of pt, pt with edema to Bilateral upper extremities. Bilateral upper extremities noted to be red, warm and painful to touch. Recommend bilateral upper arm US. Bedside RN, Hunter tapia. VAT will follow.

## 2024-03-07 NOTE — PROGRESS NOTES
Infectious Disease Progress Note      Date of admission: 2/26/2024 12:34 PM     Reason for consult: Sepsis, UTI    Subjective: Patient is very lethargic.  Unable to answer any questions.    Interval events: This is an 88-year-old male patient presenting here with sepsis, fevers chills and flank pain due to what appears to be a left-sided pyelonephritis.  The patient also had bilateral thoracentesis done due to pleural effusions, both of which are consistent with transudative fluids.  Blood cultures with no growth to date.  TTE without vegetations.  The patient was treated initially with IV Zosyn; however, developed volume overload and due to concerns of the high sodium load with IV Zosyn, he was de-escalated to IV ceftriaxone as he did not grow any MDRO's.  He was supposed to finish ceftriaxone on 3/6.  However, on 3/5, he spiked a fever with a Tmax of 38.7 °C.  As such, 2 sets of blood cultures were obtained, the patient was escalated back to IV meropenem and IV vancomycin.  Lactic acid was elevated at 3.7.  His procalcitonin was also elevated 1.64.  MRSA screen at this point was negative.  LFTs continue to worsen.  Patient clinically also continues to worsen.  And repeat CT scan of the abdomen and pelvis done on 3/6 showed slightly improvement in the left-sided pyelonephritis, with worsening bilateral pleural effusions.  Biliary tree did not show any dilatation.  Decompressed gallbladder was seen with small amount of layering stones.    Medications:    furosemide    acetaminophen    meropenem    dilTIAZem HCl ER    metoclopramide    insulin degludec    rivaroxaban    metoprolol    dilTIAZem    multivitamin with minerals    ipratropium-albuterol    acetaminophen **OR** acetaminophen **OR** acetaminophen    glucose **OR** glucose **OR** glucose-vitamin C **OR** dextrose **OR** glucose **OR** glucose **OR** glucose-vitamin C    insulin aspart    polyethylene glycol (PEG 3350)    sennosides    bisacodyl    fleet  enema    ondansetron    [Held by provider] atorvastatin    levothyroxine     Allergies:  No Known Allergies    Physical Exam:  Vitals:    03/07/24 1341   BP:    Pulse: 107   Resp:    Temp:      Vitals signs and nursing note reviewed.   Constitutional:       Appearance: Lethargic appearance.   HENT:      Head: Normocephalic and atraumatic.      Mouth: Mucous membranes are moist.   Neck:      Musculoskeletal: Neck supple.   Cardiovascular:      Rate and Rhythm: Normal rate.      Heart sounds: Normal heart sounds. No murmur. No friction rub. No gallop.    Pulmonary:      Effort: Pulmonary effort is poor. No respiratory distress.      Breath sounds: Normal breath sounds. No stridor. No wheezing, rhonchi or rales.   Chest:      Chest wall: No tenderness.   Abdominal:      General: Abdomen is flat. There is no distension.      Palpations: Abdomen is soft. There is no mass.      Tenderness: There is no tenderness. There is no guarding or rebound.      Hernia: No hernia is present.   Musculoskeletal:      Right lower leg: No edema.      Left lower leg: No edema.   Skin:     General: Skin is warm and dry.   Neurological:      Mental Status: Lethargic      Laboratory data:  I have reviewed all the lab results independently.        Recent Labs   Lab 03/05/24  0411 03/06/24  0646   RBC 5.46 5.00   HGB 12.4* 11.4*   HCT 38.1* 33.7*   MCV 69.8* 67.4*   MCH 22.7* 22.8*   MCHC 32.5 33.8   RDW 18.6 17.2   NEPRELIM 10.98*  --    WBC 14.5* 12.5*   .0 201.0      Microbiology data:  Hospital Encounter on 02/26/24   1. Blood Culture     Status: None (Preliminary result)    Collection Time: 03/06/24 12:04 AM    Specimen: Blood,peripheral   Result Value Ref Range    Blood Culture Result No Growth 1 Day N/A   2. Urine Culture, Routine     Status: None    Collection Time: 03/03/24 11:13 AM    Specimen: Urine, clean catch   Result Value Ref Range    Urine Culture No Growth at 18-24 hrs. N/A   3. Body Fluid Cult Aerobic and Anaerobic      Status: None    Collection Time: 03/01/24 10:30 PM    Specimen: Pleural Fluid, Left; Body fluid, unspecified   Result Value Ref Range    Body Fluid Culture Result No Growth 5 Days N/A    Body Fluid Smear 1+ WBCs seen N/A    Body Fluid Smear No organisms seen N/A    Body Fluid Smear This is a cytocentrifuged smear. N/A        Radiology:  I have reviewed all imagining data available independently.   CT abdomen pelvis on 3/6:  Mild improvement in left-sided pyelonephritis noted.  Worsening pleural effusions.  No biliary dilatation.  No cholecystitis.    Impression:  Mahsa Zhang is a 88 year old male with    New onset fever in the setting of acute left-sided pyelonephritis now in sepsis with threat to life  Lactic acid of 9  Continues to worsen  Currently on IV meropenem  All cultures remain negative to date  Repeat blood cultures negative so far  MRSA screen is negative  LFTs on the rise, highly concerning for biliary process given elevated alk phos of 555  Repeat CT abdomen pelvis is not showing any biliary dilatation or cholecystitis  However, repeat labs not available as patient's family were refusing blood draws.  Acute left-sided pyelonephritis, with question of infarcts  Blood cultures with no growth to date  Urine culture with no growth to date  Currently on IV meropenem  As per the above  EBV and CMV serologies consistent with remote infection  Question of pneumonia seen on his CT chest  Could be also related to his effusions, compressive atelectasis    Recommendations:    Continue to follow-up on blood cultures  Continue to trend LFTs  Continue IV meropenem for now  Okay for midline placement for IV access if needed to  Continue monitor daily labs for antibiotic toxicity  Further recommendations will depend on the above workup and clinical progress    The plan of care was discussed with the primary hospital team, Holden Avelar MD     Recommendations were also discussed with the patient's; all questions  were answered.  Overall poor prognosis     Thank you for this consultation. Please don't hesitate to call the ID team for questions or any acute changes in patient's clinical condition.    Please note that this report has been produced using speech recognition software and may contain errors related to that system including, but not limited to, errors in grammar, punctuation, and spelling, as well as words and phrases that possibly may have been recognized inappropriately.  If there are any questions or concerns, contact the dictating provider for clarification.    The  Century Cures Act makes medical notes like these available to patients in the interest of transparency. Please be advised this is a medical document. Medical documents are intended to carry relevant information, facts as evident, and the clinical opinion of the practitioner. The medical note is intended as peer to peer communication and may appear blunt or direct. It is written in medical language and may contain abbreviations or verbiage that are unfamiliar.     Becki Gonzales MD  [facility]. Tel: [phone]. Fax: [phone].     Mahsa Zhang : 1935 MRN: [id number] [facility]: [id number]

## 2024-03-07 NOTE — PROGRESS NOTES
[facility]   part of [facility]    Progress Note     Mahsa Zhang Patient Status:  Inpatient    1935 MRN [id number]   Location [facility] 4SW-A Attending Holden Avelar MD   Hosp Day # 10 PCP Agus Martinez MD     Follow up for: The primary encounter diagnosis was Urinary tract infection without hematuria, site unspecified. Diagnoses of Dehydration, Elevated liver enzymes, Fever, unspecified fever cause, and Lactic acid acidosis were also pertinent to this visit.      Interval History/Subjective:     Febrile overnight, O2 requirements up to 15LPM   ABG: PH 7.45, PC02 29, P02 60, Lactic 6.1   CXR this am with increased pulmonary edema   RN unable to give PO meds this AM due to difficulty swallowing     Drowsy, not able to follow commands or respond to questions from family. Family at bedside concerned hydration status, waxing/waning alertness, frequency of needle sticks. Family concerned level on nursing care on the floor is not sufficient.      Declining midline for now. Long discussion with both sons & daughter-in-law today, they are in agreement they do not want him to suffer if he is not improving     Vital signs:  Temp:  [97.8 °F (36.6 °C)-101 °F (38.3 °C)] 97.9 °F (36.6 °C)  Pulse:  [] 87  Resp:  [20-34] 22  BP: (108-156)/(40-87) 156/55  SpO2:  [84 %-97 %] 94 %    Physical Exam:    General: NAD, Appears uncomfortable , Nontoxic though chronically ill appearing   Respiratory: CTAB, tachypneic without accessory muscle use, no wheezes   Cardiovascular: S1, S2. IRIR, rates sustaining 130s  on monitor; No murmurs appreciated  Abdomen: Soft, NTND, no guarding/rebound   Neurologic: No focal neurological deficits.   Extremities: No LIZ: bilateral nonpitting upper extremity roxie  Skin: Dry, no rashes, ulcers or lesions     Diagnostic Data:      Labs:  Recent Labs   Lab 24  0540 24  0435 24  0431 24  0442 24  0411 24  0646   WBC 5.0 4.7 5.9 10.5 14.5*  12.5*   HGB 11.7* 11.8* 10.7* 11.2* 12.4* 11.4*   MCV 69.2* 67.9* 69.0* 68.0* 69.8* 67.4*   .0 170.0 188.0 194.0 228.0 201.0   INR 1.49* 1.43* 1.40*  --   --   --        Recent Labs   Lab 03/04/24  0442 03/05/24  0411 03/06/24  0646   * 176* 146*   BUN 30* 32* 31*   CREATSERUM 1.37* 1.45* 1.28   CA 9.6 10.0 10.9*   ALB 1.5* 1.6* 1.3*   * 127* 130*   K 4.0 4.2 4.1    101 103   CO2 22.0 21.0 19.0*   ALKPHO 453* 585* 555*   AST 86* 82* 82*   * 110* 88*   BILT 1.6 1.7 1.9   TP 5.2* 5.4* 4.9*       Recent Labs   Lab 03/01/24  0540 03/02/24  0435 03/03/24  0431   PTP 18.2* 17.5* 17.2*   INR 1.49* 1.43* 1.40*       No results for input(s): \"TROP\", \"CK\" in the last 168 hours.         Imaging: Imaging data reviewed in Epic.    Medications:    meropenem  500 mg Intravenous Q12H    dilTIAZem HCl ER  240 mg Oral Daily    furosemide  20 mg Intravenous Daily    insulin degludec  5 Units Subcutaneous Daily    rivaroxaban  15 mg Oral Daily with food    multivitamin with minerals  1 tablet Oral Daily    insulin aspart  1-5 Units Subcutaneous TID AC and HS    [Held by provider] atorvastatin  40 mg Oral Nightly    levothyroxine  25 mcg Oral Before breakfast       ASSESSMENT / PLAN:     Mahsa Zhang Is a a 88 year old male who presented with fever/chills, generalized weakness, found to have septic shock secondary to pyelonephritis    Problem List / Diagnoses    Septic shock-resolved  Acute pyelonephritis  Acute respiratory failure with hypoxia  Bilateral pleural effusions  Acute on chronic HFpEF  A-fib with RVR  Hypothyroidism  DM2  Dysphagia  Moderate to severe protein calorie malnutrition    Plan    Drowsiness -- intermittent   -- ABG without evidence of CO2 retention  -- given sig elevated lactate, fevers, waxing/waning character suspect metabolic encephalopathy secondary to sepsis/respiratory failure    Septic shock- resolved, now worsening   Elevated lactate- resolved, now worsening   Likely  secondary to acute pyelonephritis, right  Recurrent Fevers and chills  - Lactate elevated, repeat now clear  - Broad-spectrum antibiotics, cont IV Zosyn, dc IV vancomycin  - CT abdomen pelvis suggestive of acute pyelonephritis, UA grossly abnormal  - Blood cultures x 2 NGTD, urine cultures reviewed, no significant resistance  -- 3/6/24: recurrent fever+rigors, repeat cultures drawn and pending. D/w ID,  abx broadened to meropenem   -- 3/7: CT Abd/Pelvis unremarkable for acute pathology  -- 3/8: continues to be febrile, lactate increased to 6.1    Acute hypoxic respiratory failure -- worsening 3/6-3.7    Pleural effusion bilaterally  - Status post thoracentesis on the right 2/29  -Status post left thoracentesis 3/1  - Pleural fluid analysis suggestive of transudative fluid, concern for heart failure (below)   --off BiPAP, weaned down to 5LPM O2, now back on 15L  -- imaging & labs suggestive of pulmoanry edema, continue diuresis per pulmonary/Cardiology   - Sputum cultures obtained  - Pulmonology following, recommendations reviewed     Hypothyroidism  - Continue lev  othyroxine     Type 2 diabetes with hyperglycemia  - ISS, Accu-Cheks     Arrhythmia  Bradycardia status post pacemaker  A-fib with RVR --unimproved   - On Xarelto for AC  - Started on diltiazem drip, weaned to orals  -- given inability to take PO, d/w Cardiology, will transition back to diltiazem gtt      Suspect acute on chronic diastolic heart failure   - Continue diuresis per cardiology  - 2D echo- reviewed, no acute pathology to explain preseentaiton   - Telemetry     Dysphagia  - History of esophageal stricture that was dilated few months ago  - Recently treated with esophageal candidiasis  - Still unable to tolerate solids  - Speech evaluation-passed  - GI on consult-family deferring EGD at this time.  GI signed off      Elevated liver enzymes   - Possibly from shock?  - Ultrasound liver- with small ascites  - trend liver enzymes,  - Workup per GI,  recommendations reviewed-GI now signed off  -- CT Abd with no obvious pathology; if fails to improve consider Abd US vs HIDA      Protein calorie malnutrition, moderate to severe  - Poor caloric intake  - Patient/family not willing to consider tube feeds at this time  - Dietitian on consult for calorie count     Advance care planning  - POA: Son  - CODE STATUS: DNR/select-discussed with patient and his family at bedside and on presentation  -- long discussion with family to review hospital course over last several days and plan of care, reviewed prognosis and goals of care. Family wishes to continue aggressive medical measures short of intubation/mechanical ventilation, but state they are interested in comfort care measures if it is determined the patient will not recover from above issues. They would like to meet with hospice for information but are not ready to transition care yet. Approximately 30 minutes spent in face to face advanced care planning, exclusive of clinical care time.        DVT Mechanical Prophylaxis:   SCDs,    DVT Pharmacologic Prophylaxis   Medication    rivaroxaban (Xarelto) tab 15 mg              Code Status: DNAR/Selective Treatment    Dispo: worsening on floor; will discuss possibility of transfer to [facility] with Pulmonary for closer observation;  DELMI >2 midnights    Plan of care discussed with patient and/or family at bedside.    MARIAJOSE Avelar MD  [facility]   [phone]      This note was created using voice recognition technology. It may include inadvertent transcriptional errors. Any such errors should be contextually interpreted and should not be taken to alter the content or the meaning.     Note to Patient: The 21st Century Cures Act makes medical notes like these available to patients in the interest of transparency. However, be advised this is a medical document. It is intended as peer to peer communication. It is written in medical language and may contain abbreviations  or verbiage that are unfamiliar. It may appear blunt or direct. Medical documents are intended to carry relevant information, facts as evident, and the clinical opinion of the practitioner and not intended to be the primary source of your information.  Please refer directly to myself or clinical staff for information regarding plan of care.

## 2024-03-07 NOTE — PROGRESS NOTES
[facility]  Progress Note    Mahsa Zhang Patient Status:  Inpatient    1935 MRN [id number]   Location [facility] 4SW-A Attending Veronica Hood,    Hosp Day # 10 PCP Agus Martinez MD     Subjective:   More lethargic today.  Family at bedside to translate. No new focal cardiovascular complaints appreciated.    Objective:  Vitals:    24 1130 24 1131 24 1341 24 1352   BP:  155/60     BP Location:  Right leg     Pulse:  106 107    Resp:       Temp: 100.1 °F (37.8 °C)      TempSrc: Axillary      SpO2:  93% 96% 100%   Weight:       Height:           Temp (24hrs), Av.4 °F (37.4 °C), Min:97.9 °F (36.6 °C), Max:101 °F (38.3 °C)      Medications:   Scheduled:    furosemide  40 mg Intravenous Daily    meropenem  500 mg Intravenous Q12H    dilTIAZem HCl ER  240 mg Oral Daily    insulin degludec  5 Units Subcutaneous Daily    rivaroxaban  15 mg Oral Daily with food    multivitamin with minerals  1 tablet Oral Daily    insulin aspart  1-5 Units Subcutaneous TID AC and HS    [Held by provider] atorvastatin  40 mg Oral Nightly    levothyroxine  25 mcg Oral Before breakfast       Continuous Infusion:         PRN Medications:   acetaminophen, metoclopramide, metoprolol, dilTIAZem, ipratropium-albuterol, acetaminophen **OR** acetaminophen **OR** acetaminophen, glucose **OR** glucose **OR** glucose-vitamin C **OR** dextrose **OR** glucose **OR** glucose **OR** glucose-vitamin C, polyethylene glycol (PEG 3350), sennosides, bisacodyl, fleet enema, ondansetron    Intake/Output:     Intake/Output Summary (Last 24 hours) at 3/7/2024 1644  Last data filed at 3/7/2024 0634  Gross per 24 hour   Intake 370 ml   Output 300 ml   Net 70 ml       Wt Readings from Last 3 Encounters:   24 161 lb 13.1 oz (73.4 kg)   23 144 lb 3.2 oz (65.4 kg)   23 179 lb 7.3 oz (81.4 kg)       Allergies:  No Known Allergies    Physical Exam:   General:  Well-developed / Well-nourished.  No acute  distress.  HEENT:  Normocephalic.  Atraumatic.  No icterus. Dry appearing mucous membranes.    Neck:  There is no jugular venous distention.   Cardiovascular:  Cardiovascular examination demonstrates an irregular rate and rhythm.  There is normal S1, S2.  There is no S3 or S4.  There are no murmurs, rubs, or gallops.  No click is appreciated.  PMI is nondisplaced with a normal apical impulse.    Pulmonary:  Lungs are coarse on auscultation bilaterally.  There are no focal rales, rhonchi, or wheezes.  Good air movement is noted throughout both lung fields.   Abdomen:  The abdomen is soft, non-distended, and non-tender.  Bowel sounds are present and normoactive.  No organomegaly is appreciated.  Extremities:  Extremities do not demonstrate any evidence of peripheral edema.   No cyanosis or clubbing of the digits is appreciated.  Neurologic:  Alert and interactive.  Integument:  No visible rashes are appreciated.      Laboratory/Data:   Recent Labs   Lab 03/04/24 0442 03/05/24 0411 03/06/24  0646   * 176* 146*   BUN 30* 32* 31*   CREATSERUM 1.37* 1.45* 1.28   CA 9.6 10.0 10.9*   ALB 1.5* 1.6* 1.3*   * 127* 130*   K 4.0 4.2 4.1    101 103   CO2 22.0 21.0 19.0*   ALKPHO 453* 585* 555*   AST 86* 82* 82*   * 110* 88*   BILT 1.6 1.7 1.9   TP 5.2* 5.4* 4.9*       Recent Labs   Lab 03/03/24  0431 03/04/24 0442 03/05/24 0411 03/06/24  0646   RBC 4.74 4.91 5.46 5.00   HGB 10.7* 11.2* 12.4* 11.4*   HCT 32.7* 33.4* 38.1* 33.7*   MCV 69.0* 68.0* 69.8* 67.4*   MCH 22.6* 22.8* 22.7* 22.8*   MCHC 32.7 33.5 32.5 33.8   RDW 17.0 17.3 18.6 17.2   NEPRELIM 4.75 8.73* 10.98*  --    WBC 5.9 10.5 14.5* 12.5*   .0 194.0 228.0 201.0       Recent Labs   Lab 03/01/24  0540 03/02/24  0435 03/03/24  0431   PTP 18.2* 17.5* 17.2*   INR 1.49* 1.43* 1.40*       No results for input(s): \"TROP\", \"CK\" in the last 168 hours.      Tele: AF - mildly elevated rate.  Increases with fever.    Diagnostics:    Echo:    Conclusions:     1. Left ventricle: The cavity size was normal. Wall thickness was at the      upper limits of normal. Systolic function was normal. The estimated      ejection fraction was 50-55%, by visual assessment. No diagnostic      evidence for regional wall motion abnormalities. Unable to assess LV      diastolic function due to heart rhythm.   2. Right ventricle: The cavity size was increased. Pacer wire noted in the      right ventricle. The RV pressure during systole is 56mm Hg.   3. Left atrium: The left atrial volume was mildly increased.   4. Aortic valve: There was mild regurgitation.   5. Tricuspid valve: There was moderate regurgitation.   6. Ascending aorta: The ascending aorta was 3.9cm diameter.       Assessment:     Atrial Fibrillation  -Rate controlled on PO CCB  -On Xarelto  2.     SSS                -S/P PPM  3.    Sepsis                -Pyelonephritis                -PNA  4.    HTN Hx  5.    Hypothyroidism  6.    DM  7.    Anemia  8.    Coagulopathy  9.    Elevated LFTs   -Improving  10.  NL EF  11.  Pulmonary HTN  12.  Pleural effusion                -S/P Thora  13.  HFpEF  14.  Respiratory insufficiency   -Component of CHF   -ARDS suspected   -On O2  15.  Mild RI  16.  Increasing WBC  17.  Hyponatremia    Plan:    Stop PO CCB.  Transition to IV CCB rate control   IV Lasix - monitor volume status / renal function closely.  BMP in AM   O2 NC support PRN   ATBx per ID   Tele monitor   F/U BMP   Transferring to ICU setting.  Establish goals of care with family.    Live Duong MD  3/7/2024

## 2024-03-07 NOTE — PLAN OF CARE
Problem: Diabetes/Glucose Control  Goal: Glucose maintained within prescribed range  Description: INTERVENTIONS:  - Monitor Blood Glucose as ordered  - Assess for signs and symptoms of hyperglycemia and hypoglycemia  - Administer ordered medications to maintain glucose within target range  - Assess barriers to adequate nutritional intake and initiate nutrition consult as needed  - Instruct patient on self management of diabetes  Outcome: Progressing     Problem: Patient/Family Goals  Goal: Patient/Family Long Term Goal  Description: Patient's Long Term Goal: Go home    Interventions:  - cooperate with PT  - sit in the chair during the day  - employ anxiety coping mechanisms    - See additional Care Plan goals for specific interventions  Outcome: Progressing  Goal: Patient/Family Short Term Goal  Description: Patient's Short Term Goal:   have HR <120  3/05: reduce fever  3/6 AM: Monitor HR  3/06 noc: improve 02 needs    Interventions:   - cardizem drip changing to PO  - oxygen/BiPAP prn  - sit up in chair during the day    - See additional Care Plan goals for specific interventions  Outcome: Progressing     Problem: CARDIOVASCULAR - ADULT  Goal: Absence of cardiac arrhythmias or at baseline  Description: INTERVENTIONS:  - Continuous cardiac monitoring, monitor vital signs, obtain 12 lead EKG if indicated  - Evaluate effectiveness of antiarrhythmic and heart rate control medications as ordered  - Initiate emergency measures for life threatening arrhythmias  - Monitor electrolytes and administer replacement therapy as ordered  Outcome: Progressing     Problem: METABOLIC/FLUID AND ELECTROLYTES - ADULT  Goal: Electrolytes maintained within normal limits  Description: INTERVENTIONS:  - Monitor labs and rhythm and assess patient for signs and symptoms of electrolyte imbalances  - Administer electrolyte replacement as ordered  - Monitor response to electrolyte replacements, including rhythm and repeat lab results as  appropriate  - Fluid restriction as ordered  - Instruct patient on fluid and nutrition restrictions as appropriate  Outcome: Progressing     Problem: MUSCULOSKELETAL - ADULT  Goal: Return mobility to safest level of function  Description: INTERVENTIONS:  - Assess patient stability and activity tolerance for standing, transferring and ambulating w/ or w/o assistive devices  - Assist with transfers and ambulation using safe patient handling equipment as needed  - Ensure adequate protection for wounds/incisions during mobilization  - Obtain PT/OT consults as needed  - Advance activity as appropriate  - Communicate ordered activity level and limitations with patient/family  Outcome: Progressing     Problem: Impaired Activities of Daily Living  Goal: Achieve highest/safest level of independence in self care  Description: Interventions:  - Assess ability and encourage patient to participate in ADLs to maximize function  - Promote sitting position while performing ADLs such as feeding, grooming, and bathing  - Educate and encourage patient/family in tolerated functional activity level and precautions during self-care  - Provide support under elbow of weak side to prevent shoulder subluxation  Outcome: Progressing     Problem: RESPIRATORY - ADULT  Goal: Achieves optimal ventilation and oxygenation  Description: INTERVENTIONS:  - Assess for changes in respiratory status  - Assess for changes in mentation and behavior  - Position to facilitate oxygenation and minimize respiratory effort  - Oxygen supplementation based on oxygen saturation or ABGs  - Provide Smoking Cessation handout, if applicable  - Encourage broncho-pulmonary hygiene including cough, deep breathe, Incentive Spirometry  - Assess the need for suctioning and perform as needed  - Assess and instruct to report SOB or any respiratory difficulty  - Respiratory Therapy support as indicated  - Manage/alleviate anxiety  - Monitor for signs/symptoms of CO2  retention  Outcome: Progressing     Problem: SKIN/TISSUE INTEGRITY - ADULT  Goal: Incision(s), wounds(s) or drain site(s) healing without S/S of infection  Description: INTERVENTIONS:  - Assess and document risk factors for pressure ulcer development  - Assess and document skin integrity  - Assess and document dressing/incision, wound bed, drain sites and surrounding tissue  - Implement wound care per orders  - Initiate isolation precautions as appropriate  - Initiate Pressure Ulcer prevention bundle as indicated  Outcome: Progressing     Problem: Delirium  Goal: Minimize duration of delirium  Description: Interventions:  - Encourage use of hearing aids, eye glasses  - Promote highest level of mobility daily  - Provide frequent reorientation  - Promote wakefulness i.e. lights on, blinds open  - Promote sleep, encourage patient's normal rest cycle i.e. lights off, TV off, minimize noise and interruptions  - Encourage family to assist in orientation and promotion of home routines  Outcome: Progressing

## 2024-03-07 NOTE — CM/SW NOTE
PASRR completed in case patient/family chooses [facility] at discharge, letter uploaded to jaky ANTOINE/WILIAN to remain available for support and/or discharge planning.    DANYELL Santoyo  Discharge Planner  [phone]

## 2024-03-07 NOTE — PROGRESS NOTES
Pulmonary Progress Note        NAME: Mahsa Zhang - ROOM: [id number]-A - MRN: [id number] - Age: 88 year old - : 1935        Last 24hrs: Overnight events reviewed, febrile and tachycardic, along w/ tachypnea, required more O2 overnight, currently laying bed, minimally responsive    OBJECTIVE:  Vitals:    24 0354 24 0634 24 0728 24 0741   BP:  156/55     BP Location:  Right arm     Pulse:  87 111 109   Resp:  22     Temp:  97.9 °F (36.6 °C)     TempSrc:  Oral     SpO2: 97% 94% (!) 85% 91%   Weight:       Height:           Oxygen Therapy  SpO2: 91 %  O2 Device: High flow nasal cannula  FiO2 (%): 40 % (decreased from 0.6)  O2 Flow Rate (L/min): 15 L/min  Pulse Oximetry Type: Continuous  Oximetry Probe Site Changed: No  Pulse Ox Probe Location: Right hand                  Intake/Output Summary (Last 24 hours) at 3/7/2024 0853  Last data filed at 3/7/2024 0634  Gross per 24 hour   Intake 370 ml   Output 300 ml   Net 70 ml       Scheduled Medication:   meropenem  500 mg Intravenous Q12H    dilTIAZem HCl ER  240 mg Oral Daily    furosemide  20 mg Intravenous Daily    insulin degludec  5 Units Subcutaneous Daily    rivaroxaban  15 mg Oral Daily with food    multivitamin with minerals  1 tablet Oral Daily    insulin aspart  1-5 Units Subcutaneous TID AC and HS    [Held by provider] atorvastatin  40 mg Oral Nightly    levothyroxine  25 mcg Oral Before breakfast     Continuous Infusing Medication:    Lungs: clear to auscultation bilaterally  Heart: tachy s1 s2  Abdomen: soft, non-tender; bowel sounds normal; no masses,  no organomegaly  Extremities: edema 1+ in LE miracle    Labs reviewed as noted below    Imaging: chest x-ray reviewed- miracle infiltrates consistent w/ pulm edema    ASSESSMENT/PLAN:    Acute hypoxemic respiratory failure - due to pneumonia, CHF, pleural effusions  - wean O2 as able, on 10L currently  -repeat ABG and chest x-ray  Sepsis - due to possible pyelonephritis and pneumonia. Chest  CT showed consolidations and pleural effusions  -fevers have increased over the past 48 hrs  -lactic acid has been elevated for the majority of the patient's admission  - antibiotics per ID  -tylenol PRN  -repeat x-ray as above  Bilateral pleural effusions - s/p R thoracentesis 2/28 and L thoracentesis on 3/1/24 ; fluid consistent with transudate, likely CHF  - diuresis w/ lasix, increase to 40mg daily  HFpEF - EF 50-55% and RVSP 56 on echo  - diuresis w/ lasix  Atrial fib/RVR  - cardiology following  Proph  -xarelto  Dispo  - DNAR/select  - will follow   -discussed w/ family      Edwar ChristiansonVassar Brothers Medical Centerrhina  [facility]  Pulmonary and Critical Care        Abg shows worsening lactic acidosis.  Will transfer back to ICU for expert medical care

## 2024-03-07 NOTE — PROGRESS NOTES
@2000 Hospitalist paged. Sepsis advisory. Fever of 101.0, Tachycardiac,  On 6L high flow, increased respirations, BP stable. Denied pain. Patient observed lethargic. Family at bedside. Orders for IV acetaminophen prn per family request and to notify pulmonology. Hospitalist discussed plan of care with son.     Pulmonology paged. ABGs ordered and IV lasix ordered.   ABGs results relayed to Pulmonology.   PH 7.45, PC02 29, P02 60, Lactic 6.1    @ 0045. 02 needs changed. Patient desating, 84% on 6L HF.  Labored breathing, and diaphoretic. Afebrile. Placed on non-rebreather 15L HF. Respiratory at bedside. Weaned 02 needs and placed on HF. MD and Pulmonology paged. CXR ordered and results relayed.       Patient Ax02-3. Responds to tactile stimuli, Telemetry- Afib. HR 90-100s. Temperature improved. 02 sat 93-94%. Placed on 8L HF  Incontinent, primofit, briefed. Urine output annette.   Awaiting dopplers for Bilateral upper extremities.

## 2024-03-07 NOTE — CM/SW NOTE
Informed by RN that patient's family interested in hospice information. Met with patient's son ([place]), KISHA, and other son. Discussed palliative care vs hospice care briefly. They would like to meet with [facility] and [facility]. Left message with [facility]. Obtained order from MD. Sent referral in aidin.    /CM to remain available for support and/or discharge planning.    Samantha Drew AMANDA  Discharge Planner  [phone]

## 2024-03-07 NOTE — PLAN OF CARE
Patient AAOx0-1, lethargic. 10-15L HFNC. Tele afib. Incontinent, primofit. IV lasix. Max assist x2. Generalized edema. Too lethargic for PO intake. IV tylenol given for low grade temp 100.1. IV merrem. Dopplers completed today. Left arm midline placed. Family refusing accucheck and blood draws today.     Patient transferred to ICU. Report called to PRICILLA Fernandez.      Problem: Diabetes/Glucose Control  Goal: Glucose maintained within prescribed range  Description: INTERVENTIONS:  - Monitor Blood Glucose as ordered  - Assess for signs and symptoms of hyperglycemia and hypoglycemia  - Administer ordered medications to maintain glucose within target range  - Assess barriers to adequate nutritional intake and initiate nutrition consult as needed  - Instruct patient on self management of diabetes  Outcome: Progressing     Problem: Patient/Family Goals  Goal: Patient/Family Long Term Goal  Description: Patient's Long Term Goal: Go home    Interventions:  - cooperate with PT  - sit in the chair during the day  - employ anxiety coping mechanisms    - See additional Care Plan goals for specific interventions  Outcome: Progressing  Goal: Patient/Family Short Term Goal  Description: Patient's Short Term Goal:   have HR <120  3/05: reduce fever  3/6 AM: Monitor HR  3/06 noc: improve 02 needs    Interventions:   - cardizem drip changing to PO  - oxygen/BiPAP prn  - sit up in chair during the day    - See additional Care Plan goals for specific interventions  Outcome: Progressing     Problem: CARDIOVASCULAR - ADULT  Goal: Absence of cardiac arrhythmias or at baseline  Description: INTERVENTIONS:  - Continuous cardiac monitoring, monitor vital signs, obtain 12 lead EKG if indicated  - Evaluate effectiveness of antiarrhythmic and heart rate control medications as ordered  - Initiate emergency measures for life threatening arrhythmias  - Monitor electrolytes and administer replacement therapy as ordered  Outcome: Progressing     Problem:  METABOLIC/FLUID AND ELECTROLYTES - ADULT  Goal: Electrolytes maintained within normal limits  Description: INTERVENTIONS:  - Monitor labs and rhythm and assess patient for signs and symptoms of electrolyte imbalances  - Administer electrolyte replacement as ordered  - Monitor response to electrolyte replacements, including rhythm and repeat lab results as appropriate  - Fluid restriction as ordered  - Instruct patient on fluid and nutrition restrictions as appropriate  Outcome: Progressing     Problem: MUSCULOSKELETAL - ADULT  Goal: Return mobility to safest level of function  Description: INTERVENTIONS:  - Assess patient stability and activity tolerance for standing, transferring and ambulating w/ or w/o assistive devices  - Assist with transfers and ambulation using safe patient handling equipment as needed  - Ensure adequate protection for wounds/incisions during mobilization  - Obtain PT/OT consults as needed  - Advance activity as appropriate  - Communicate ordered activity level and limitations with patient/family  Outcome: Progressing     Problem: Impaired Activities of Daily Living  Goal: Achieve highest/safest level of independence in self care  Description: Interventions:  - Assess ability and encourage patient to participate in ADLs to maximize function  - Promote sitting position while performing ADLs such as feeding, grooming, and bathing  - Educate and encourage patient/family in tolerated functional activity level and precautions during self-care    Outcome: Progressing     Problem: RESPIRATORY - ADULT  Goal: Achieves optimal ventilation and oxygenation  Description: INTERVENTIONS:  - Assess for changes in respiratory status  - Assess for changes in mentation and behavior  - Position to facilitate oxygenation and minimize respiratory effort  - Oxygen supplementation based on oxygen saturation or ABGs  - Provide Smoking Cessation handout, if applicable  - Encourage broncho-pulmonary hygiene including  cough, deep breathe, Incentive Spirometry  - Assess the need for suctioning and perform as needed  - Assess and instruct to report SOB or any respiratory difficulty  - Respiratory Therapy support as indicated  - Manage/alleviate anxiety  - Monitor for signs/symptoms of CO2 retention  Outcome: Progressing     Problem: SKIN/TISSUE INTEGRITY - ADULT  Goal: Incision(s), wounds(s) or drain site(s) healing without S/S of infection  Description: INTERVENTIONS:  - Assess and document risk factors for pressure ulcer development  - Assess and document skin integrity  - Assess and document dressing/incision, wound bed, drain sites and surrounding tissue  - Implement wound care per orders  - Initiate isolation precautions as appropriate  - Initiate Pressure Ulcer prevention bundle as indicated  Outcome: Progressing     Problem: Delirium  Goal: Minimize duration of delirium  Description: Interventions:  - Encourage use of hearing aids, eye glasses  - Promote highest level of mobility daily  - Provide frequent reorientation  - Promote wakefulness i.e. lights on, blinds open  - Promote sleep, encourage patient's normal rest cycle i.e. lights off, TV off, minimize noise and interruptions  - Encourage family to assist in orientation and promotion of home routines  Outcome: Progressing

## 2024-03-07 NOTE — CM/SW NOTE
[facility] received referral for informational hospice meeting.  Will call to schedule a meeting with family.    Carol Vu [facility]  [facility]  [phone]

## 2024-03-07 NOTE — CONGREGATE LIVING REVIEW
[facility] Authorization    The [facility] Review Committee has reviewed this case and the patient IS APPROVED for discharge to a facility for Short Term Skilled once the following procedure is followed:     - The physician discharge instructions (contained within the MEL note for SNF) must inlcude the below appropriate and approved COVID instructions to the facility    For questions regarding CLRC approval process, please contact the CM assigned to the case.  For questions regarding RN discharge workflow, please contact the unit Clinical Leader.

## 2024-03-07 NOTE — CM/SW NOTE
[facility] met with the patient's family in the waiting room to explain hospice services and the medicare benefit.  Family would like time to discuss as a family and potentially meet with another hospice company to compare.  Son will call [facility] tomorrow to discuss further services.  Patient is transferring to ICU.    Carol uV LCSW  [facility]  [phone]

## 2024-03-07 NOTE — PROGRESS NOTES
CRITICAL CARE            S: Pt is being transferred back to the ICU for a variety of reasons, including recurrent oxygen desaturations, family uncomfortable with nursing care, etc. Family is concerned that it took too long for the patient to get tylenol when he was febrile. They are concerned that the 5th floor does not have a temporal thermometer to check his temperature, whereas in the ICU they can check his temperature with a forehead/temporal thermometer.    Pt is lethargic but arousable to stimuli.    Meds:   furosemide  40 mg Intravenous Daily    meropenem  500 mg Intravenous Q12H    dilTIAZem HCl ER  240 mg Oral Daily    insulin degludec  5 Units Subcutaneous Daily    rivaroxaban  15 mg Oral Daily with food    multivitamin with minerals  1 tablet Oral Daily    insulin aspart  1-5 Units Subcutaneous TID AC and HS    [Held by provider] atorvastatin  40 mg Oral Nightly    levothyroxine  25 mcg Oral Before breakfast       Prn Meds:  acetaminophen, metoclopramide, metoprolol, dilTIAZem, ipratropium-albuterol, acetaminophen **OR** acetaminophen **OR** acetaminophen, glucose **OR** glucose **OR** glucose-vitamin C **OR** dextrose **OR** glucose **OR** glucose **OR** glucose-vitamin C, polyethylene glycol (PEG 3350), sennosides, bisacodyl, fleet enema, ondansetron    Infusions:      OBJECTIVE:  Vitals:    03/07/24 1130 03/07/24 1131 03/07/24 1341 03/07/24 1352   BP:  155/60     Pulse:  106 107    Resp:       Temp: 100.1 °F (37.8 °C)      TempSrc: Axillary      SpO2:  93% 96% 100%   Weight:       Height:         O2: 15LPM 100%   Gen - lethargic but arousable  Lungs - scattered crackles bilaterally  CV - tachycardic, no murmurs  Abdomen - soft, nontender to palpation   Extremities - + LE edema      Labs:  Recent Labs   Lab 03/04/24 0442 03/05/24  0411 03/06/24  0646   WBC 10.5 14.5* 12.5*   HGB 11.2* 12.4* 11.4*   .0 228.0 201.0     Recent Labs   Lab 03/04/24 0442 03/05/24  0411 03/06/24  0001  03/06/24  0109 03/06/24  0646   * 127*  --   --  130*   K 4.0 4.2  --   --  4.1    101  --   --  103   CO2 22.0 21.0  --   --  19.0*   BUN 30* 32*  --   --  31*   CREATSERUM 1.37* 1.45*  --   --  1.28   * 176*  --   --  146*   ANIONGAP 6 5  --   --  8   ALB 1.5* 1.6*  --   --  1.3*   CA 9.6 10.0  --   --  10.9*   ALKPHO 453* 585*  --   --  555*   AST 86* 82*  --   --  82*   * 110*  --   --  88*   BILT 1.6 1.7  --   --  1.9   TP 5.2* 5.4*  --   --  4.9*   LACTI  --   --  3.7* 3.5* 3.7*     Recent Labs   Lab 03/01/24  0540 03/02/24  0435 03/03/24  0431   INR 1.49* 1.43* 1.40*     Recent Labs   Lab 03/02/24  0435 03/06/24  0001   PBNP 22,207* 30,209*     Recent Labs   Lab 03/01/24  0540 03/02/24  0435 03/06/24  0001   CRP 9.64* 10.30*  --    PCT  --  1.53* 1.64*     Recent Labs   Lab 03/06/24  2045 03/07/24  1339   ABGPHT 7.45 7.40   HLFPGW4H 29* 28*   RXAIG4D 60* 52*   ABGHCO3 22.6 19.7*   ABGBE -2.8* -6.3*         Recent Labs   Lab 03/05/24 2028   COVID19 Not Detected   INFAPCR Not Detected   INFBPCR Not Detected       Imaging reviewed    ASSESSMENT AND PLAN      Acute hypoxemic respiratory failure - due to a combination of pneumonia, CHF, pleural effusions  -wean O2 as able. Pt is currently saturating 100% so he does not need this much oxygen! Perhaps once he gets to the ICU we can wean it.  -continue antibiotics as below  Sepsis - due to pyelonephritis and pneumonia   -antibiotics per ID: meropenem  -follow up cultures  Pleural effusions - s/p R thoracentesis 2/28 and L thoracentesis on 3/1/24 ; fluid consistent with transudate, likely CHF  - continue lasix  HFpEF - EF 50-55% and RVSP 56 on echo  - diuresis w/ lasix  Atrial fib/RVR  - cardiology following  Hyponatremia - likely from HF  -diuresis  -monitor sodium level  DM2  -monitor accuchecks  -insulin aspart, degludec  Hypothyroidism  -levothyroxine  RUQ pain / elevated LFTs - Abdominal US with mild GB thickening but no stones or  torres's sign. Labs have improved  - continue to monitor  - GI has signed off  Nutrition  -reg diet  Proph  -xarelto  Dispo  - DNAR/select  - family met with hospice, but they don't seem ready for hospice yet (they just wanted \"information\")  - transfer back to ICU for closer nursing care    Gianni Zimmerman M.D.  Pulmonary/Critical Care

## 2024-03-08 NOTE — OCCUPATIONAL THERAPY NOTE
Following for OT.  Pt with transfer back to ICU, now on AVAPs with worsening respiratory status, lethargy. Pt to transition to comfort care. Will dc as OT not consistent with end of life care.  Please reconsult if POC changes.

## 2024-03-08 NOTE — DIETARY NOTE
[facility]  NUTRITION ASSESSMENT    Pt does not meet malnutrition criteria at this time.    NUTRITION INTERVENTION:    Meal and Snacks - ADAT once medically appropriate; monitor patient po intake. Encourage adequate po of appropriate diet.  Medical Food Supplements - Will evaluate once diet advanced.  Enteral Nutrition - If DHT placed and confirmed in correct position, initiate Glucerna 1.5 at 20 ml/hr and advance 20 ml/hr q4hrs to GOAL: Glucerna 1.5 at 55 ml/hr x 22hrs.  Goal TF to provide 1815 kcal, 100 gm protein, 920 ml free water, 100% RDI's.  Recommend  ml q4hrs; TF + FWF = 1820 ml total free water.  Hold TF 1 hour before and after administering synthroid.  Coordination of Nutrition Care - SLP consult prior to diet advancement; Palliative care consult for GOC discussion.    PATIENT STATUS: 3/8: Pt transferred back to ICU yesterday d/t increased WOB and now on AVAPS. Pt with poor PO intake during this entire admit (11 days) and is now NPO and likely needs DHT placement but family has previously refused. GOC discussion planned for today. Will provide TF recs above if in line with GOC.    3/5: Pt and granson in room during visit. Pt's grandson fed him a KF shake for BF and that was it so far. He was gonna offer some cereal. Pt is taking some apple sauce and juice, some KF shake and some yogurt. No % noted on kcal count tickets. 25% of meals noted in flowsheet. ~ 100-200 kcals per meal. Continue to encourage po intakes as able and RD to follow per protocol.   2/28: 88 year old Jimmy speaking male admitted on 2/26 presents with urosepsis. Pt screened d/t MST score 2. Visited pt at bedside with son present who provided hx. He reports pt with decreased appetite/PO intake and associated wt loss since previous admission in October. Reports pt was having stomach aches and on liquid diet so he lost 15-20 lbs but unsure what his UBW was or is now. Per chart review, pt weighs 144 lbs during previous admit in  Nov and current wt recorded at 163 lbs. Son reports pt with poor appetite currently and only had apple juice and a little milk of bfast. Denies GI symptoms at this time. SLP evaluated and recommends regular solids and thin liquids. NKFA but pt is vegetarian. Son reports pt drinking [employer] Glucose Support daily at home and agreeable to vanilla [employer] Standard during admit. Pt not appropriate for DM diet education at this time; will consider addressing as pt improves. All questions answered at this time.    PMH: Endocrine disorder, Fracture of left calcaneus (6/29/2016), Hearing impairment, Osteoarthrosis, Stroke, Thyroid disease, Type II DM, essential hypertension, Unspecified sleep apnea    ANTHROPOMETRICS:  Ht: 177.8 cm (5' 10\")  Wt: 78.3 kg (172 lb 9.9 oz).   BMI: Body mass index is 24.77 kg/m².  IBW: 75.5 kg    WEIGHT HISTORY: Per chart, pt with ~16 lb wt loss x 10 months (9%, not significant per standards).  Patient Weight(s) for the past 336 hrs:   Weight   03/08/24 0600 78.3 kg (172 lb 9.9 oz)   03/04/24 0000 73.4 kg (161 lb 13.1 oz)   03/03/24 0000 72.5 kg (159 lb 13.3 oz)   03/02/24 0500 70.8 kg (156 lb 1.4 oz)   03/01/24 0030 73.1 kg (161 lb 2.5 oz)   02/29/24 0200 74.7 kg (164 lb 10.9 oz)   02/28/24 0500 74 kg (163 lb 2.3 oz)   02/27/24 0313 71.5 kg (157 lb 10.1 oz)   02/26/24 2043 76.2 kg (168 lb)   02/26/24 1151 72.6 kg (160 lb)       Wt Readings from Last 10 Encounters:   03/08/24 78.3 kg (172 lb 9.9 oz)   11/06/23 65.4 kg (144 lb 3.2 oz)   04/30/23 81.4 kg (179 lb 7.3 oz)   05/20/22 84.8 kg (187 lb)   07/01/21 90.6 kg (199 lb 12.8 oz)   10/05/20 93.6 kg (206 lb 6.4 oz)   08/25/20 88.9 kg (196 lb)   09/19/19 90.5 kg (199 lb 9.6 oz)   08/29/19 92.1 kg (203 lb)   07/02/19 88.9 kg (196 lb)      NUTRITION:  Diet:       Procedures    NPO        Percent Meals Eaten (last 3 days)       Date/Time Percent Meals Eaten (%)    03/05/24 0900 25 %    03/05/24 1200 25 %          Food Allergies:  No  Cultural/Ethnic/Restorationist Preferences Addressed: Yes - vegetarian.    GI SYSTEM REVIEW: WNL; last BM 3/7  Skin/Wounds: WNL    NUTRITION RELATED PHYSICAL FINDINGS:     1. Body Fat/Muscle Mass: mild muscle depletion Temple region and Clavicle region     2. Fluid Accumulation: upper extremity edema, lower extremity edema, and b/l hands, b/l feet      NUTRITION PRESCRIPTION: 70.8 kg (3/2)  Calories: 3789-0866 calories/day (25-30 kcal/kg)  Protein:  grams protein/day (1.0-1.5 gm/kg)  Fluid: ~1 ml/kcal or per MD discretion    NUTRITION DIAGNOSIS/PROBLEM:  Inadequate oral intake related to inability to take or tolerate as evidenced by need for NPO status    MONITOR AND EVALUATE/NUTRITION GOALS:  PO intake of 75% of meals TID - Resolved  PO intake of 75% of oral nutrition supplement/s - Resolved  Weight stable within 1 to 2 lbs during admission - Ongoing  Start alternative nutrition in 24-48 hrs if diet is not able to advance- New    MEDICATIONS:  SSI aspart, degludec 5 units daily, synthroid, abx, multivitamin with minerals, lasix    LABS:  , Na 131, POC glucose x 24hrs: 131-206 mg/dl, A1c = 8.7%    Pt is at High nutrition risk    Karolyn Terry, RD, LDN, CNSC  Clinical Dietitian  Spectra: [phone]

## 2024-03-08 NOTE — CM/SW NOTE
ARASH spoke with the patient's son [place].  He stated he is not ready for follow up today and stated he would prefer to call us when he is ready.  Hospice referral line provided to son.  [facility] will not be following up daily with family.  Will follow peripherally.    Carol Vu LCSW  [facility]  [phone]

## 2024-03-08 NOTE — PROGRESS NOTES
03/08/24 0919   BiPAP   $ RT Standby Charge (per 15 min) 1   $ Vent Care / Non-Invasive Initial Day Yes   Device V60   BiPAP / CPAP CE# 9   BiPAP bacteria filter Yes   BiPAP Pre-use check ok? Yes   BIPAP plugged into main power? Yes   Mode AVAPS   Interface Full face mask   Mask Size Large   Control Settings   Oxygen Percent 50 %   Inspiratory time 0.9   Insp rise time 3   AVAPS   Min IPAP 15   Max IPAP 30   EPAP Level 5   Set rate 16   Tidal volume 600   SIPAP   Resp (!) 47   FiO2 (%) 50 %   BiPAP/CPAP Alarm Settings   Hi Rate 50   Low Rate 10   Hi VT 1500   Low    Hi Pressure 30   Low Pressure 3   Low Pressure Delay 20   Low MV 2   BiPAP/CPAP Monitored Parameters   Pulse 107   SpO2 93 %   PIP 19   Total Rate 47 breaths/min   Minute Volume 35   Tidal Volume 756   Total Leak 14   Trigger % 100   Ti/Ttot % 32   Toleration Well     Pt having labored breathing, placed on AVAPS with above setting.

## 2024-03-08 NOTE — PHYSICAL THERAPY NOTE
Following for PT.  Pt with transfer back to ICU, now on AVAPs with worsening respiratory status, lethargy.   Will f/u for PT next week if pt remains in house.    Addendum: 15:29:  Pt to transition to comfort care. Will dc as PT not consistent with end of life care.  Please reconsult if POC changes.

## 2024-03-08 NOTE — PROGRESS NOTES
Infectious Disease Progress Note      Date of admission: 2/26/2024 12:34 PM     Reason for consult: Sepsis, UTI    Subjective: Patient is very lethargic.  Unable to answer any questions.    Interval events: This is an 88-year-old male patient presenting here with sepsis, fevers chills and flank pain due to what appears to be a left-sided pyelonephritis.  The patient also had bilateral thoracentesis done due to pleural effusions, both of which are consistent with transudative fluids.  Blood cultures with no growth to date.  TTE without vegetations.  The patient was treated initially with IV Zosyn; however, developed volume overload and due to concerns of the high sodium load with IV Zosyn, he was de-escalated to IV ceftriaxone as he did not grow any MDRO's.  He was supposed to finish ceftriaxone on 3/6.  However, on 3/5, he spiked a fever with a Tmax of 38.7 °C.  As such, 2 sets of blood cultures were obtained, the patient was escalated back to IV meropenem and IV vancomycin.  Lactic acid was elevated at 3.7.  His procalcitonin was also elevated 1.64.  MRSA screen at this point was negative.  LFTs continue to worsen.  Patient clinically also continues to worsen.  And repeat CT scan of the abdomen and pelvis done on 3/6 showed slightly improvement in the left-sided pyelonephritis, with worsening bilateral pleural effusions.  Biliary tree did not show any dilatation.  Decompressed gallbladder was seen with small amount of layering stones.  Patient continues to worsen at this point, currently in the ICU on BiPAP.    Medications:    phenylephrine    [Held by provider] furosemide    dilTIAZem    insulin aspart    acetaminophen    meropenem    metoclopramide    insulin degludec    rivaroxaban    metoprolol    dilTIAZem    multivitamin with minerals    ipratropium-albuterol    acetaminophen **OR** acetaminophen **OR** acetaminophen    glucose **OR** glucose **OR** glucose-vitamin C **OR** dextrose **OR** glucose **OR**  glucose **OR** glucose-vitamin C    polyethylene glycol (PEG 3350)    sennosides    bisacodyl    fleet enema    ondansetron    [Held by provider] atorvastatin    levothyroxine     Allergies:  No Known Allergies    Physical Exam:  Vitals:    03/08/24 1030   BP: 101/51   Pulse:    Resp:    Temp:      Vitals signs and nursing note reviewed.   Constitutional:       Appearance: Lethargic appearance.  On BiPAP  HENT:      Head: Normocephalic and atraumatic.      Mouth: Mucous membranes are moist.   Neck:      Musculoskeletal: Neck supple.   Cardiovascular:      Rate and Rhythm: Normal rate.      Heart sounds: Normal heart sounds. No murmur. No friction rub. No gallop.    Pulmonary:      Effort: Pulmonary effort is poor. No respiratory distress.      Breath sounds: Normal breath sounds. No stridor. No wheezing, rhonchi or rales.   Chest:      Chest wall: No tenderness.   Abdominal:      General: Abdomen is flat. There is no distension.      Palpations: Abdomen is soft. There is no mass.      Tenderness: There is no tenderness. There is no guarding or rebound.      Hernia: No hernia is present.   Musculoskeletal:      Right lower leg: No edema.      Left lower leg: No edema.   Skin:     General: Skin is warm and dry.   Neurological:      Mental Status: Lethargic      Laboratory data:  I have reviewed all the lab results independently.  Lab Results   Component Value Date    WBC 17.5 03/08/2024    HGB 12.0 03/08/2024    HCT 35.6 03/08/2024    .0 03/08/2024    CREATSERUM 1.95 03/08/2024    BUN 60 03/08/2024     03/08/2024    K 5.1 03/08/2024     03/08/2024    CO2 19.0 03/08/2024     03/08/2024    CA 12.7 03/08/2024    ALB 1.4 03/08/2024    ALKPHO 574 03/08/2024    BILT 2.7 03/08/2024    TP 5.2 03/08/2024     03/08/2024     03/08/2024        Recent Labs   Lab 03/07/24  1726 03/08/24  0519   RBC 5.21 5.30   HGB 11.8* 12.0*   HCT 35.2* 35.6*   MCV 67.6* 67.2*   MCH 22.6* 22.6*   MCHC 33.5  33.7   RDW 18.5 18.8   NEPRELIM 14.73*  --    WBC 16.6* 17.5*   .0 231.0      Microbiology data:  Hospital Encounter on 02/26/24   1. Blood Culture     Status: None (Preliminary result)    Collection Time: 03/06/24 12:04 AM    Specimen: Blood,peripheral   Result Value Ref Range    Blood Culture Result No Growth 2 Days N/A   2. Urine Culture, Routine     Status: None    Collection Time: 03/03/24 11:13 AM    Specimen: Urine, clean catch   Result Value Ref Range    Urine Culture No Growth at 18-24 hrs. N/A   3. Body Fluid Cult Aerobic and Anaerobic     Status: None    Collection Time: 03/01/24 10:30 PM    Specimen: Pleural Fluid, Left; Body fluid, unspecified   Result Value Ref Range    Body Fluid Culture Result No Growth 5 Days N/A    Body Fluid Smear 1+ WBCs seen N/A    Body Fluid Smear No organisms seen N/A    Body Fluid Smear This is a cytocentrifuged smear. N/A        Radiology:  I have reviewed all imagining data available independently.   CT abdomen pelvis on 3/6:  Mild improvement in left-sided pyelonephritis noted.  Worsening pleural effusions.  No biliary dilatation.  No cholecystitis.    Impression:  Mahsa Zhang is a 88 year old male with    New onset fever in the setting of acute left-sided pyelonephritis now in sepsis with threat to life  Lactic acid of 9  Continues to worsen  Currently on IV meropenem  All cultures remain negative to date  Repeat blood cultures negative so far  MRSA screen is negative  LFTs on the rise, concerning for biliary process given elevated alk phos of 555  Repeat CT abdomen pelvis is not showing any biliary dilatation or cholecystitis  Acute left-sided pyelonephritis, with question of infarcts  Blood cultures with no growth to date  Urine culture with no growth to date  Currently on IV meropenem  As per the above  EBV and CMV serologies consistent with remote infection  Question of pneumonia seen on his CT chest  Could be also related to his effusions, compressive  atelectasis    Recommendations:    Continue to follow-up on blood cultures  Continue to trend LFTs  Continue IV meropenem for now  Check abdominal ultrasound  Continue monitor daily labs for antibiotic toxicity  Further recommendations will depend on the above workup and clinical progress    The plan of care was discussed with the primary hospital team, Gianni Varela DO along with the critical care team     Recommendations were also discussed with the patient's; all questions were answered.  Overall poor prognosis     Thank you for this consultation. Please don't hesitate to call the ID team for questions or any acute changes in patient's clinical condition.    Please note that this report has been produced using speech recognition software and may contain errors related to that system including, but not limited to, errors in grammar, punctuation, and spelling, as well as words and phrases that possibly may have been recognized inappropriately.  If there are any questions or concerns, contact the dictating provider for clarification.    The  [employer] C[employer] Act makes medical notes like these available to patients in the interest of transparency. Please be advised this is a medical document. Medical documents are intended to carry relevant information, facts as evident, and the clinical opinion of the practitioner. The medical note is intended as peer to peer communication and may appear blunt or direct. It is written in medical language and may contain abbreviations or verbiage that are unfamiliar.     Becki Gonzales MD  [facility]. Tel: [phone]. Fax: [phone].     Mahsa Coatspta : 1935 MRN: [id number] [facility]: [id number]

## 2024-03-08 NOTE — CONSULTS
[facility]   part of [facility]  Palliative Care Initial Consult Note    Mahsa Zhang Patient Status:  Inpatient    1935 MRN [id number]   Location [facility] 4SW-A Attending Gianni Varela, DO   Hosp Day # 11 PCP Agus Martinez MD     Date of Consult: 3/8/2024  Patient seen at: [facility]    Reason for Consultation: Dr. Zimmerman requested a consult for goals of care      Subjective     History of Present Illness: Mahsa Zhang is a 88 year old male with a history of diabetes type II, bradycardia s/p pacemaker, atrial fib on Xarelto, CVA, who was admitted from home on 2024 for fever and weakness. Work up in our hospital revealed sepsis felt to be from right sided pyelonephritis, elevated lactate, elevated liver enzymes, atrial fib with RVR, bilateral pleural effusion s/p thoracentesis  and 3/1, respiratory distress and fever.      History was obtained from [facility]   Today is day #11 of hospitalization.   This is the 2nd hospitalization in the past 6 months.    When I entered the room, the patient was on AVAPS, eyes closed and on pressors. His son, Cleveland and wife were at the bedside. I introduced myself from palliative care. Cleveland was on a work call and requested to meet at 1600 with his brother.   Since my introduction, Dr. Zimmerman followed up with the patient and family and a decision was made to transition to comfort care. Palliative care will not be involved with Mahsa.        Bowel Movement         3/7/2024  1710             Stool Count Calculated for I/O: 1          Wt Readings from Last 6 Encounters:   24 172 lb 9.9 oz (78.3 kg)   23 144 lb 3.2 oz (65.4 kg)   23 179 lb 7.3 oz (81.4 kg)   22 187 lb (84.8 kg)   21 199 lb 12.8 oz (90.6 kg)   10/05/20 206 lb 6.4 oz (93.6 kg)          Substance History:   reports that he quit smoking about 39 years ago. He has quit using smokeless tobacco.  reports no history of alcohol use.  reports no  history of drug use.    Spiritual Assessment:   Jew - Parish Not Listed    Past Medical History/Past Surgical History:   Medical History: obtained from [facility]  Past Medical History:   Diagnosis Date    Arrhythmia     BRADYCARDIA VS AFIB    Disorder of thyroid     Endocrine disorder     hypothyroid    Fracture of left calcaneus 6/29/2016    Resolved last addressed  7/25/16    Hearing impairment     High blood pressure     Osteoarthrosis, unspecified whether generalized or localized, unspecified site     Stroke (HCC) July 26, 2014    TIA    Thyroid disease     Type II or unspecified type diabetes mellitus without mention of complication, not stated as uncontrolled     Unspecified essential hypertension     Unspecified sleep apnea     Visual impairment     glasses     Past Surgical History:   Procedure Laterality Date    CARDIAC PACEMAKER PLACEMENT      REMV CATARACT EXTRACAP,INSERT LENS Right 9/2/2015    Procedure: RIGHT PHACOEMULSIFICATION OF CATARACT WITH INTRAOCULAR LENS IMPLANT 93549;  Surgeon: Louis Jason MD;  Location: [facility]    REMV CATARACT EXTRACAP,INSERT LENS Left 9/16/2015    Procedure: LEFT PHACOEMULSIFICATION OF CATARACT WITH INTRAOCULAR LENS IMPLANT [id number];  Surgeon: Louis Jason MD;  Location: [facility]       Family History: obtained from [facility]  Family History   Problem Relation Age of Onset    Cancer Brother        Allergies:  No Known Allergies    Medications:     Current Facility-Administered Medications:     phenylephrine in sodium chloride 0.9% (Salomon-Synephrine) 50 mg/250mL infusion premix,  mcg/min, Intravenous, Continuous    [Held by provider] furosemide (Lasix) 10 mg/mL injection 40 mg, 40 mg, Intravenous, Daily    dilTIAZem (cardIZEM) 100 mg in sodium chloride 0.9% 100 mL IVPB-ADDV, 5 mg/hr, Intravenous, Continuous    insulin aspart (NovoLOG) 100 Units/mL FlexPen 1-5 Units, 1-5 Units, Subcutaneous, q6h    acetaminophen (Ofirmev) 10 mg/mL infusion  premix 1,000 mg, 1,000 mg, Intravenous, Q6H PRN    meropenem (Merrem) 500 mg in sodium chloride 0.9% 100 mL IVPB-MBP, 500 mg, Intravenous, Q12H    metoclopramide (Reglan) 5 mg/mL injection 5 mg, 5 mg, Intravenous, Q8H PRN    insulin degludec 100 units/mL flextouch 5 Units, 5 Units, Subcutaneous, Daily    rivaroxaban (Xarelto) tab 15 mg, 15 mg, Oral, Daily with food    metoprolol (Lopressor) 5 mg/5mL injection 5 mg, 5 mg, Intravenous, Q6H PRN    dilTIAZem (cardIZEM) 25 mg/5mL injection 10 mg, 10 mg, Intravenous, Q6H PRN    multivitamin with minerals (Thera M Plus) tab 1 tablet, 1 tablet, Oral, Daily    ipratropium-albuterol (Duoneb) 0.5-2.5 (3) MG/3ML inhalation solution 3 mL, 3 mL, Nebulization, Q6H PRN    acetaminophen (Tylenol) tab 650 mg, 650 mg, Oral, Q4H PRN **OR** acetaminophen (Tylenol) 160 MG/5ML oral liquid 650 mg, 650 mg, Oral, Q4H PRN **OR** acetaminophen (Tylenol) rectal suppository 650 mg, 650 mg, Rectal, Q4H PRN    glucose (Dex4) 15 GM/59ML oral liquid 15 g, 15 g, Oral, Q15 Min PRN **OR** glucose (Glutose) 40% oral gel 15 g, 15 g, Oral, Q15 Min PRN **OR** glucose-vitamin C (Dex-4) chewable tab 4 tablet, 4 tablet, Oral, Q15 Min PRN **OR** dextrose 50% injection 50 mL, 50 mL, Intravenous, Q15 Min PRN **OR** glucose (Dex4) 15 GM/59ML oral liquid 30 g, 30 g, Oral, Q15 Min PRN **OR** glucose (Glutose) 40% oral gel 30 g, 30 g, Oral, Q15 Min PRN **OR** glucose-vitamin C (Dex-4) chewable tab 8 tablet, 8 tablet, Oral, Q15 Min PRN    polyethylene glycol (PEG 3350) (Miralax) 17 g oral packet 17 g, 17 g, Oral, Daily PRN    sennosides (Senokot) tab 17.2 mg, 17.2 mg, Oral, Nightly PRN    bisacodyl (Dulcolax) 10 MG rectal suppository 10 mg, 10 mg, Rectal, Daily PRN    fleet enema (Fleet) 7-19 GM/118ML rectal enema 133 mL, 1 enema, Rectal, Once PRN    ondansetron (Zofran) 4 MG/2ML injection 4 mg, 4 mg, Intravenous, Q6H PRN    [Held by provider] atorvastatin (Lipitor) tab 40 mg, 40 mg, Oral, Nightly    levothyroxine  (Synthroid) tab 25 mcg, 25 mcg, Oral, Before breakfast          Objective      Vital Signs:  Blood pressure 112/57, pulse 108, temperature 99.3 °F (37.4 °C), temperature source Temporal, resp. rate (!) 35, height 5' 10\" (1.778 m), weight 172 lb 9.9 oz (78.3 kg), SpO2 99%.  Body mass index is 24.77 kg/m².      Physical Exam:  General: somnolent. In mild respiratory distress.      Cardiac: Irregularly irregular  Lungs:  Increased effort on AVAPs  Abdomen: Soft, non-tender, non-distended.  Extremities: + LE and + UE edema present  Neurologic: not oriented to   person, place, time, situation.   Skin: Warm and dry.    Hematology:  Lab Results   Component Value Date    WBC 17.5 (H) 03/08/2024    HGB 12.0 (L) 03/08/2024    HCT 35.6 (L) 03/08/2024    .0 03/08/2024       Coags:  Lab Results   Component Value Date    INR 1.40 (H) 03/03/2024    PTT 60.4 (H) 11/08/2023       Chemistry:  Lab Results   Component Value Date    CREATSERUM 1.95 (H) 03/08/2024    BUN 60 (H) 03/08/2024     (L) 03/08/2024    K 5.1 03/08/2024     03/08/2024    CO2 19.0 (L) 03/08/2024     (H) 03/08/2024    CA 12.7 (H) 03/08/2024    ALB 1.4 (L) 03/08/2024    ALKPHO 574 (H) 03/08/2024    BILT 2.7 (H) 03/08/2024    TP 5.2 (L) 03/08/2024     (H) 03/08/2024     (H) 03/08/2024    DDIMER 2.82 (H) 04/03/2015    MG 2.4 11/07/2023    TROP <0.046 04/04/2015       Imaging:  US ABDOMEN COMPLETE (CPT=76700)    Result Date: 3/8/2024  CONCLUSION:  No acute sonographic findings.   LOCATION:  [facility]    Dictated by (CST): Quan Bahena MD on 3/08/2024 at 11:51 AM     Finalized by (CST): Quan Bahena MD on 3/08/2024 at 11:52 AM       XR CHEST AP PORTABLE  (CPT=71045)    Result Date: 3/8/2024  CONCLUSION:  No significant interval change.   LOCATION:  [facility]      Dictated by (CST): Quan Bahena MD on 3/08/2024 at 10:23 AM     Finalized by (CST): Quan Bahena MD on 3/08/2024 at 10:24 AM       XR CHEST AP PORTABLE   (CPT=71045)    Result Date: 3/7/2024  CONCLUSION:  No significant change in multifocal opacities in both lungs consistent with severe pneumonia or edema.  Left pleural effusion is unchanged.   LOCATION:  [facility]      Dictated by (CST): Agus Ferguson MD on 3/07/2024 at 2:01 PM     Finalized by (CST): Agus Ferguson MD on 3/07/2024 at 2:02 PM       US VENOUS DOPPLER ARM BILAT - DIAG IMG (CPT=93970)    Result Date: 3/7/2024  CONCLUSION:  1. There was some limitation in this study due to patient being unable to fully cooperate.  There is no deep venous thrombus detected.  Superficial veins were suboptimally evaluated.    LOCATION:  [facility]    Dictated by (CST): Agus Ferguson MD on 3/07/2024 at 11:12 AM     Finalized by (CST): Agus Ferguson MD on 3/07/2024 at 11:17 AM       XR CHEST AP PORTABLE  (CPT=71045)    Result Date: 3/7/2024  CONCLUSION:  There is overall no significant change in the appearance of the portable chest radiograph.  There are bilateral pleural effusions greater on left than right and there is dependent density in lungs which could represent dependent atelectasis,  multifocal pneumonia or edema.  Preliminary report was reviewed and there is no significant discrepancy.    LOCATION:  [facility]      Dictated by (CST): Agus Ferguson MD on 3/07/2024 at 7:31 AM     Finalized by (CST): Agus Ferguson MD on 3/07/2024 at 7:32 AM       CT ABDOMEN+PELVIS(CONTRAST ONLY)(CPT=74177)    Result Date: 3/6/2024  CONCLUSION:   1. Findings of left pyelonephritis appear to be slightly improved compared to the study of 2/26/2024.  There is no hydronephrosis.  2. The slightly worsening of right greater than left pleural effusion with atelectasis at the lung bases.  The possibility of a secondary pneumonia in the airspace disease cannot be completely excluded.  3. Left inguinal hernia containing small bowel without obstruction.  4. Severe atherosclerotic disease of the aorta and its mesenteric branches.   LOCATION:  [facility]    Dictated by (CST): Driss Serna MD on 3/06/2024 at 2:09 PM     Finalized by (CST): Driss Serna MD on 3/06/2024 at 2:16 PM        Principal Problem:    Urinary tract infection without hematuria, site unspecified  Active Problems:    Dehydration    Elevated liver enzymes    Fever, unspecified fever cause    Lactic acid acidosis    Pyelonephritis    Elevated INR    Longstanding persistent atrial fibrillation (HCC)    Right upper quadrant abdominal pain    Elevated LFTs    Chronic heart failure with preserved ejection fraction (HCC)  Palliative Care encounter    Assessment and Recommendations      Goals of care:   Patient transitioned to comfort care prior to family having availability to discuss GOC.   Healthcare Agent's Name: Cleveland Zhang    Discussed today's visit with Linnea PLEITEZ.   Thank you for allowing Palliative Care services to participate in the care of Mahsa Zhang. No charge for the visit.     Nuris Bowman, SANGEETA  3/8/2024  12:38 PM  Palliative Care Services    The 21st Century Cures Act makes medical notes like these available to patients in the interest of transparency. Please be advised this is a medical document. Medical documents are intended to carry relevant information, facts as evident, and the clinical opinion of the practitioner. The medical note is intended as peer to peer communication and may appear blunt or direct. It is written in medical language and may contain abbreviations or verbiage that are unfamiliar.

## 2024-03-08 NOTE — PROGRESS NOTES
CRITICAL CARE            S: Pt is lethargic today. He's tachypneic and was started on AVAPS. He is also having hypotension.    Meds:   [Held by provider] furosemide  40 mg Intravenous Daily    insulin aspart  1-5 Units Subcutaneous q6h    meropenem  500 mg Intravenous Q12H    insulin degludec  5 Units Subcutaneous Daily    rivaroxaban  15 mg Oral Daily with food    multivitamin with minerals  1 tablet Oral Daily    [Held by provider] atorvastatin  40 mg Oral Nightly    levothyroxine  25 mcg Oral Before breakfast       Prn Meds:  acetaminophen, metoclopramide, metoprolol, dilTIAZem, ipratropium-albuterol, acetaminophen **OR** acetaminophen **OR** acetaminophen, glucose **OR** glucose **OR** glucose-vitamin C **OR** dextrose **OR** glucose **OR** glucose **OR** glucose-vitamin C, polyethylene glycol (PEG 3350), sennosides, bisacodyl, fleet enema, ondansetron    Infusions:   phenylephrine 100 mcg/min (03/08/24 1015)    dilTIAZem Stopped (03/08/24 0716)       OBJECTIVE:  Vitals:    03/08/24 0919 03/08/24 1000 03/08/24 1015 03/08/24 1030   BP: 107/47 (!) 84/44 (!) 87/44 101/51   Pulse: 107 109 106    Resp: (!) 47      Temp:       TempSrc:       SpO2: 93% 96%     Weight:       Height:         O2: AVAPS    Gen - lethargic  Lungs - CTAB anterior exam, tachpneic  CV - regular rate & rhythm. Normal S1, S2. No murmurs, rubs, or gallops appreciated.  Abdomen - soft, nontender to palpation   Extremities - No cyanosis, clubbing, edema appreciated.      Labs:  Recent Labs   Lab 03/06/24  0646 03/07/24  1726 03/08/24  0519   WBC 12.5* 16.6* 17.5*   HGB 11.4* 11.8* 12.0*   .0 213.0 231.0     Recent Labs   Lab 03/06/24  0001 03/06/24  0109 03/06/24  0646 03/07/24  1726 03/08/24  0519   NA  --   --  130* 129* 131*   K  --   --  4.1 4.8 5.1   CL  --   --  103 100 103   CO2  --   --  19.0* 19.0* 19.0*   BUN  --   --  31* 47* 60*   CREATSERUM  --   --  1.28 1.77* 1.95*   GLU  --   --  146* 173* 151*   ANIONGAP  --   --  8 10 9    ALB  --   --  1.3* 1.4* 1.4*   CA  --   --  10.9* 12.6* 12.7*   ALKPHO  --   --  555* 540* 574*   AST  --   --  82* 178* 160*   ALT  --   --  88* 112* 115*   BILT  --   --  1.9 2.3* 2.7*   TP  --   --  4.9* 4.9* 5.2*   LACTI 3.7* 3.5* 3.7*  --   --      Recent Labs   Lab 03/02/24 0435 03/03/24  0431   INR 1.43* 1.40*     Recent Labs   Lab 03/02/24 0435 03/06/24  0001   PBNP 22,207* 30,209*     Recent Labs   Lab 03/02/24 0435 03/06/24  0001   CRP 10.30*  --    PCT 1.53* 1.64*     Recent Labs   Lab 03/06/24  2045 03/07/24  1339 03/08/24  1011   ABGPHT 7.45 7.40 7.34*   QYMMYX3G 29* 28* 27*   YSWCI0B 60* 52* 66*   ABGHCO3 22.6 19.7* 17.2*   ABGBE -2.8* -6.3* -9.7*       Recent Labs   Lab 03/05/24 2028   COVID19 Not Detected   INFAPCR Not Detected   INFBPCR Not Detected       Imaging reviewed    ASSESSMENT AND PLAN      Acute hypoxemic respiratory failure - due to a combination of pneumonia, CHF, pleural effusions  -continue AVAPS  -continue antibiotics as below  -unable to give more diuretics now due to shock  Sepsis - now with shock, due to pyelonephritis and pneumonia.   -gentle IVF  -start phenylephrine with goal MAP>60 (avoid norepinephrine in light of tachycardia)  -continue meropenem per ID  -follow up cultures  Pleural effusions - s/p R thoracentesis 2/28 and L thoracentesis on 3/1/24 ; fluid consistent with transudate, likely CHF  - monitor for now, no acute indication for repeat thoracentesis  HFpEF - EF 50-55% and RVSP 56 on echo  - diuretics are on hold due to hypotension  - cardiology is following  JARVIS - now with worsening renal function  -start vasopressors  -gentle IVF  -monitor labs, urine output  Atrial fib/RVR  - cardiology following  Hyponatremia - likely from HF  -diuretics now held due to hypotension  -monitor sodium level  DM2  -monitor accuchecks  -insulin aspart, degludec  Hypothyroidism  -levothyroxine  RUQ pain / elevated LFTs - Abdominal US with mild GB thickening but no stones or  torres's sign. Labs have improved  - continue to monitor  - GI has signed off  Nutrition  -NPO while on AVAPS  -may need dobhoff tube placement for administration of meds/tube feeds, if in line with goals of care  Proph  -xarelto on hold as pt is NPO  Dispo  - DNAR/select  - family met with hospice, but they don't seem ready for hospice yet (they just wanted \"information\")  - ICU - pt is critically ill      35 min critical care time      Gianni Zimmerman M.D.  Pulmonary/Critical Care   On call Intensivist 03/08/24    Addendum    Discussed w/ patient's son at the bedside. They have decided on comfort care. I think this is very reasonable, in light of his condition.     Will order morphine, prn ativan. Remove bipap and use supplemental oxygen prn. Discontinue any non-essential meds / lab testing.    Gianni Zimmerman M.D.  Pulmonary/Critical Care

## 2024-03-08 NOTE — PLAN OF CARE
Resumed care at 0730. On high flow NC, transitioned to AVAPS due to tachypnea. Still tachypnea throughout shift. BP low, small bolus given as ordered, held IV lasix this morning per order. Started shad pressor this morning. NPO. Eyes open to voice or stimulation. Per family transitioned to comfort care this afternoon around 1500, family at bedside. Started morphine drip, titrating per order. Scop patch applied.

## 2024-03-08 NOTE — PROGRESS NOTES
[facility]   part of [facility]    Progress Note     Mahsa Zhang Patient Status:  Inpatient    1935 MRN [id number]   Location [facility] 4SW-A Attending Holden Avelar MD   Hosp Day # 11 PCP Agus Martinez MD     Follow up for: The primary encounter diagnosis was Urinary tract infection without hematuria, site unspecified. Diagnoses of Dehydration, Elevated liver enzymes, Fever, unspecified fever cause, and Lactic acid acidosis were also pertinent to this visit.      Interval History/Subjective:     Pt seen and examined.  More lethargic today, started on AVAPS.  Hypotensive, now on shad gtt.  Family at bedside, are discussing transition to comfort care.      Vital signs:  Temp:  [97.6 °F (36.4 °C)-99.4 °F (37.4 °C)] 99.3 °F (37.4 °C)  Pulse:  [] 110  Resp:  [24-47] 46  BP: ()/(35-96) 104/49  SpO2:  [91 %-100 %] 99 %  FiO2 (%):  [40 %-50 %] 40 %    Physical Exam:    General:  AVAPS in place.  Appears uncomfortable , Nontoxic though chronically ill appearing   Respiratory: CTAB, tachypneic without accessory muscle use, no wheezes   Cardiovascular: S1, S2. IRIR, rates sustaining 130s  on monitor; No murmurs appreciated  Abdomen: Soft, NTND, no guarding/rebound   Neurologic: No focal neurological deficits.   Extremities: No LIZ: bilateral nonpitting upper extremity roxie  Skin: Dry, no rashes, ulcers or lesions     Diagnostic Data:      Labs:  Recent Labs   Lab 24  0435 24  0431 24  0442 24  0411 24  0646 24  1726 24  0519   WBC 4.7 5.9 10.5 14.5* 12.5* 16.6* 17.5*   HGB 11.8* 10.7* 11.2* 12.4* 11.4* 11.8* 12.0*   MCV 67.9* 69.0* 68.0* 69.8* 67.4* 67.6* 67.2*   .0 188.0 194.0 228.0 201.0 213.0 231.0   INR 1.43* 1.40*  --   --   --   --   --        Recent Labs   Lab 24  0646 24  1726 24  0519   * 173* 151*   BUN 31* 47* 60*   CREATSERUM 1.28 1.77* 1.95*   CA 10.9* 12.6* 12.7*   ALB 1.3* 1.4* 1.4*   * 129*  131*   K 4.1 4.8 5.1    100 103   CO2 19.0* 19.0* 19.0*   ALKPHO 555* 540* 574*   AST 82* 178* 160*   ALT 88* 112* 115*   BILT 1.9 2.3* 2.7*   TP 4.9* 4.9* 5.2*       Recent Labs   Lab 03/02/24  0435 03/03/24  0431   PTP 17.5* 17.2*   INR 1.43* 1.40*       No results for input(s): \"TROP\", \"CK\" in the last 168 hours.         Imaging: Imaging data reviewed in Epic.    Medications:    [Held by provider] furosemide  40 mg Intravenous Daily    insulin aspart  1-5 Units Subcutaneous q6h    meropenem  500 mg Intravenous Q12H    insulin degludec  5 Units Subcutaneous Daily    rivaroxaban  15 mg Oral Daily with food    multivitamin with minerals  1 tablet Oral Daily    [Held by provider] atorvastatin  40 mg Oral Nightly    levothyroxine  25 mcg Oral Before breakfast       ASSESSMENT / PLAN:     Mahsa Zhang Is a a 88 year old male who presented with fever/chills, generalized weakness, found to have septic shock secondary to pyelonephritis    Problem List / Diagnoses    Septic shock-resolved  Acute pyelonephritis  Acute respiratory failure with hypoxia  Bilateral pleural effusions  Acute on chronic HFpEF  A-fib with RVR  Hypothyroidism  DM2  Dysphagia  Moderate to severe protein calorie malnutrition    Plan    Drowsiness -- intermittent   -- ABG without evidence of CO2 retention  -- given sig elevated lactate, fevers, waxing/waning character suspect metabolic encephalopathy secondary to sepsis/respiratory failure    Septic shock- resolved, now worsening   Elevated lactate- resolved, now worsening   Likely secondary to acute pyelonephritis, right  Recurrent Fevers and chills  - Lactate elevated, repeat now clear  - Broad-spectrum antibiotics, cont IV Zosyn, dc IV vancomycin  - CT abdomen pelvis suggestive of acute pyelonephritis, UA grossly abnormal  - Blood cultures x 2 NGTD, urine cultures reviewed, no significant resistance  -- 3/6/24: recurrent fever+rigors, repeat cultures drawn and pending. D/w ID,  abx broadened  to meropenem   -- 3/7: CT Abd/Pelvis unremarkable for acute pathology  -- 3/8: continues to be febrile, lactate increased to 6.1  -- 3/9 on shad gtt    Acute hypoxic respiratory failure -- worsening 3/6-3.7    Pleural effusion bilaterally  - Status post thoracentesis on the right 2/29  -Status post left thoracentesis 3/1  - Pleural fluid analysis suggestive of transudative fluid, concern for heart failure (below)   --off BiPAP, weaned down to 5LPM O2, now back on 15L  -- imaging & labs suggestive of pulmoanry edema, continue diuresis per pulmonary/Cardiology   - Sputum cultures obtained  - Pulmonology following, recommendations reviewed  - on AVAPS     Hypothyroidism  - Continue lev  othyroxine     Type 2 diabetes with hyperglycemia  - ISS, Accu-Cheks     Arrhythmia  Bradycardia status post pacemaker  A-fib with RVR --unimproved   - On Xarelto for AC  - Started on diltiazem drip, weaned to orals  -- given inability to take PO, d/w Cardiology, transitioned back to diltiazem gtt      Suspect acute on chronic diastolic heart failure   - Continue diuresis per cardiology  - 2D echo- reviewed, no acute pathology to explain preseentaiton   - Telemetry     Dysphagia  - History of esophageal stricture that was dilated few months ago  - Recently treated with esophageal candidiasis  - Still unable to tolerate solids  - Speech evaluation-passed  - GI on consult-family deferring EGD at this time.  GI signed off      Elevated liver enzymes   - Possibly from shock?  - Ultrasound liver- with small ascites  - trend liver enzymes,  - Workup per GI, recommendations reviewed-GI now signed off  -- CT Abd with no obvious pathology; if fails to improve consider Abd US vs HIDA      Protein calorie malnutrition, moderate to severe  - Poor caloric intake  - Patient/family not willing to consider tube feeds at this time  - Dietitian on consult for calorie count     Advance care planning  - POA: Son  - CODE STATUS: DNR/select-discussed with  patient and his family at bedside and on presentation  -- long discussion with family to review hospital course over last several days and plan of care, reviewed prognosis and goals of care. Family wishes to continue aggressive medical measures short of intubation/mechanical ventilation, but state they are interested in comfort care measures if it is determined the patient will not recover from above issues. They would like to meet with hospice for information but are not ready to transition care yet. Approximately 30 minutes spent in face to face advanced care planning, exclusive of clinical care time.        DVT Mechanical Prophylaxis:   SCDs,    DVT Pharmacologic Prophylaxis   Medication    rivaroxaban (Xarelto) tab 15 mg              Code Status: DNAR/Selective Treatment    Greater than 51 minutes spent on care of this complex pt, more than 50% face to face time.       Advanced Care Planning  While discussing goals of care with pt's son [place], his other son Jasen voluntarily participated in an advanced care planning discussion.  Additionally, Dr. Zimmerman of [facility] participated in the conversation.  The following was discussed: code status and transition to comfort care.  His family understands that he is unlikely to survive this hospitalization.  Code status changed to DNAR/comfort, he will be transitioned to comfort care.    17 minutes were spent discussing advanced care planning.  This time was exclusive of the documented time for this visit.     Dispo: worsening on floor; will discuss possibility of transfer to [facility] with Pulmonary for closer observation;  DELMI >2 midnights    Plan of care discussed with patient and/or family at bedside.    Gianni Varela DO   [facility]   [phone]      This note was created using voice recognition technology. It may include inadvertent transcriptional errors. Any such errors should be contextually interpreted and should not be taken to alter the content or the  meaning.     Note to Patient: The 21st Century Cures Act makes medical notes like these available to patients in the interest of transparency. However, be advised this is a medical document. It is intended as peer to peer communication. It is written in medical language and may contain abbreviations or verbiage that are unfamiliar. It may appear blunt or direct. Medical documents are intended to carry relevant information, facts as evident, and the clinical opinion of the practitioner and not intended to be the primary source of your information.  Please refer directly to myself or clinical staff for information regarding plan of care.

## 2024-03-08 NOTE — SPIRITUAL CARE NOTE
Spiritual Care Visit Note    Patient Name: Mahsa Zhang Date of Spiritual Care Visit: 24   : 1935 Primary Dx: Urinary tract infection without hematuria, site unspecified       Referred By: Referral From: Nurse;Other (Comment) (Consult List)    Spiritual Care Taxonomy:    Intended Effects: Journeying with someone in the grief process    Methods: Offer emotional support;Offer support    Interventions: Active listening;Ask guided questions;Ask guided questions about isatu;Ask questions to bring forth feelings    Visit Type/Summary:     - Spiritual Care: Responded to a request for spiritual care and assessed the patient for spiritual care needs. Offered empathic listening and emotional support. Patient and family expressed appreciation for  visit. Provided information regarding how to contact Spiritual Care and left a Spiritual Care information card.    Spiritual Care support can be requested via an Epic consult. For urgent/immediate needs, please contact the On Call  at: Edward: ext [phone]    Jabari Cordova MDiv, [facility]  Chaplain Resident  Ext: [phone]

## 2024-03-08 NOTE — PROGRESS NOTES
[facility]  Progress Note    Mahsa Zhang Patient Status:  Inpatient    1935 MRN [id number]   Location [facility] 4SW-A Attending Veronica Hood,    Hosp Day # 11 PCP Agus Martinez MD     Subjective:   More lethargic today.  Family at bedside.  Non-verbal.    Objective:  Vitals:    24 0600 24 0700 24 0703 24 0800   BP: 100/48 (!) 87/35 109/47 98/43   BP Location:    Right arm   Pulse: 97 99 111 100   Resp: (!) 39 (!) 43 (!) 39 (!) 41   Temp:    99.4 °F (37.4 °C)   TempSrc:    Temporal   SpO2: 96% 96% 95% 96%   Weight: 172 lb 9.9 oz (78.3 kg)      Height:           Temp (24hrs), Av.8 °F (37.1 °C), Min:97.6 °F (36.4 °C), Max:100.1 °F (37.8 °C)      Medications:   Scheduled:    furosemide  40 mg Intravenous Daily    insulin aspart  1-5 Units Subcutaneous q6h    meropenem  500 mg Intravenous Q12H    insulin degludec  5 Units Subcutaneous Daily    rivaroxaban  15 mg Oral Daily with food    multivitamin with minerals  1 tablet Oral Daily    [Held by provider] atorvastatin  40 mg Oral Nightly    levothyroxine  25 mcg Oral Before breakfast       Continuous Infusion:    dilTIAZem Stopped (24 0716)         PRN Medications:   acetaminophen, metoclopramide, metoprolol, dilTIAZem, ipratropium-albuterol, acetaminophen **OR** acetaminophen **OR** acetaminophen, glucose **OR** glucose **OR** glucose-vitamin C **OR** dextrose **OR** glucose **OR** glucose **OR** glucose-vitamin C, polyethylene glycol (PEG 3350), sennosides, bisacodyl, fleet enema, ondansetron    Intake/Output:     Intake/Output Summary (Last 24 hours) at 3/8/2024 0851  Last data filed at 3/8/2024 0600  Gross per 24 hour   Intake 41.67 ml   Output 600 ml   Net -558.33 ml       Wt Readings from Last 3 Encounters:   24 172 lb 9.9 oz (78.3 kg)   23 144 lb 3.2 oz (65.4 kg)   23 179 lb 7.3 oz (81.4 kg)       Allergies:  No Known Allergies    Physical Exam:   General:  Well-developed /  Well-nourished.  No acute distress.  HEENT:  Normocephalic.  Atraumatic.  No icterus. Dry appearing mucous membranes.    Neck:  There is no jugular venous distention.   Cardiovascular:  Cardiovascular examination demonstrates an irregular rate and rhythm.  There is normal S1, S2.  There is no S3 or S4.  There are no murmurs, rubs, or gallops.  No click is appreciated.  PMI is nondisplaced with a normal apical impulse.    Pulmonary:  Lungs are coarse on auscultation bilaterally.  There are no focal rales, rhonchi, or wheezes.  Good air movement is noted throughout both lung fields.   Abdomen:  The abdomen is soft, non-distended, and non-tender.  Bowel sounds are present and normoactive.  No organomegaly is appreciated.  Extremities:  Extremities do not demonstrate any evidence of peripheral edema.   No cyanosis or clubbing of the digits is appreciated.  Neurologic:  Lethargic.  Integument:  No visible rashes are appreciated.      Laboratory/Data:   Recent Labs   Lab 03/06/24  0646 03/07/24  1726 03/08/24  0519   * 173* 151*   BUN 31* 47* 60*   CREATSERUM 1.28 1.77* 1.95*   CA 10.9* 12.6* 12.7*   ALB 1.3* 1.4* 1.4*   * 129* 131*   K 4.1 4.8 5.1    100 103   CO2 19.0* 19.0* 19.0*   ALKPHO 555* 540* 574*   AST 82* 178* 160*   ALT 88* 112* 115*   BILT 1.9 2.3* 2.7*   TP 4.9* 4.9* 5.2*       Recent Labs   Lab 03/04/24  0442 03/05/24  0411 03/06/24  0646 03/07/24  1726 03/08/24  0519   RBC 4.91 5.46 5.00 5.21 5.30   HGB 11.2* 12.4* 11.4* 11.8* 12.0*   HCT 33.4* 38.1* 33.7* 35.2* 35.6*   MCV 68.0* 69.8* 67.4* 67.6* 67.2*   MCH 22.8* 22.7* 22.8* 22.6* 22.6*   MCHC 33.5 32.5 33.8 33.5 33.7   RDW 17.3 18.6 17.2 18.5 18.8   NEPRELIM 8.73* 10.98*  --  14.73*  --    WBC 10.5 14.5* 12.5* 16.6* 17.5*   .0 228.0 201.0 213.0 231.0       Recent Labs   Lab 03/02/24  0435 03/03/24  0431   PTP 17.5* 17.2*   INR 1.43* 1.40*       No results for input(s): \"TROP\", \"CK\" in the last 168 hours.      Tele: AF - mildly  elevated rate.  Increases with fever.    Diagnostics:    Echo:   Conclusions:     1. Left ventricle: The cavity size was normal. Wall thickness was at the      upper limits of normal. Systolic function was normal. The estimated      ejection fraction was 50-55%, by visual assessment. No diagnostic      evidence for regional wall motion abnormalities. Unable to assess LV      diastolic function due to heart rhythm.   2. Right ventricle: The cavity size was increased. Pacer wire noted in the      right ventricle. The RV pressure during systole is 56mm Hg.   3. Left atrium: The left atrial volume was mildly increased.   4. Aortic valve: There was mild regurgitation.   5. Tricuspid valve: There was moderate regurgitation.   6. Ascending aorta: The ascending aorta was 3.9cm diameter.       Assessment:     Atrial Fibrillation  -Rate controlled on IV CCB this AM - off now due to modest BP  -On Xarelto  2.     SSS                -S/P PPM  3.    Sepsis                -Pyelonephritis                -PNA  4.    HTN Hx   -Now hypotension   -Hypovolemia / infection  5.    Hypothyroidism  6.    DM  7.    Anemia  8.    Coagulopathy  9.    Elevated LFTs   -Improving  10.  NL EF  11.  Pulmonary HTN  12.  Pleural effusion                -S/P Thora  13.  HFpEF  14.  Respiratory insufficiency   -Component of CHF   -ARDS suspected   -On O2  15.  Mild RI  16.  Increasing WBC  17.  Hyponatremia    Plan:    IV CCB rate control as needed   Hold IV Lasix - monitor volume status / renal function closely.  BMP in AM   O2 NC support    ATBx per ID   Tele monitor   IV fluid bolus this AM   Monitoring in ICU setting.  Establish goals of care with family.  Prognosis guarded    Live Duong MD  3/8/2024

## 2024-03-08 NOTE — PLAN OF CARE
2000: Received asleep in bed. Arousable when family woke him up. Answered yes and no to family. On 8LHIFLO. Has non prod cough. Afib on tele 100's. Electrolyte protocol. Has primofit in place.  Accucchecks q 6 hrs. NPO. Plan: Hospice meeting lior.   0048: HR sustaining above 120's- 130's , Cardizem IV drip started at 5mg/hr.  0145: Increased Cardizem drip to 10mgs/hr.   0500: HR 90's-100's. Afib.    Problem: Diabetes/Glucose Control  Goal: Glucose maintained within prescribed range  Description: INTERVENTIONS:  - Monitor Blood Glucose as ordered  - Assess for signs and symptoms of hyperglycemia and hypoglycemia  - Administer ordered medications to maintain glucose within target range  - Assess barriers to adequate nutritional intake and initiate nutrition consult as needed  - Instruct patient on self management of diabetes  3/7/2024 2111 by Nimo Angela RN  Outcome: Progressing  3/7/2024 2110 by Nimo Angela RN  Outcome: Progressing     Problem: Patient/Family Goals  Goal: Patient/Family Long Term Goal  Description: Patient's Long Term Goal: Go home    Interventions:  - cooperate with PT  - sit in the chair during the day  - employ anxiety coping mechanisms  - discuss goals of care- another Hospice meeting tomorrow, not w/ [facility]    - See additional Care Plan goals for specific interventions  3/7/2024 2111 by Nimo Angela, RN  Outcome: Progressing  3/7/2024 2110 by Nimo Angela RN  Outcome: Progressing  Goal: Patient/Family Short Term Goal  Description: Patient's Short Term Goal:   have HR <120  3/05: reduce fever  3/6 AM: Monitor HR  3/06 noc: improve 02 needs    Interventions:   - cardizem drip changing to PO  - oxygen/BiPAP prn  - sit up in chair during the day    - See additional Care Plan goals for specific interventions  3/7/2024 2111 by Nimo Angela, RN  Outcome: Progressing  3/7/2024 2110 by Nimo Angela RN  Outcome: Progressing     Problem: CARDIOVASCULAR - ADULT  Goal: Absence of cardiac  arrhythmias or at baseline  Description: INTERVENTIONS:  - Continuous cardiac monitoring, monitor vital signs, obtain 12 lead EKG if indicated  - Evaluate effectiveness of antiarrhythmic and heart rate control medications as ordered  - Initiate emergency measures for life threatening arrhythmias  - Monitor electrolytes and administer replacement therapy as ordered  3/7/2024 2111 by Nimo Angela RN  Outcome: Progressing  3/7/2024 2110 by Nimo Angela RN  Outcome: Progressing     Problem: METABOLIC/FLUID AND ELECTROLYTES - ADULT  Goal: Electrolytes maintained within normal limits  Description: INTERVENTIONS:  - Monitor labs and rhythm and assess patient for signs and symptoms of electrolyte imbalances  - Administer electrolyte replacement as ordered  - Monitor response to electrolyte replacements, including rhythm and repeat lab results as appropriate  - Fluid restriction as ordered  - Instruct patient on fluid and nutrition restrictions as appropriate  3/7/2024 2111 by Nimo Angela RN  Outcome: Progressing  3/7/2024 2110 by Nimo Angela RN  Outcome: Progressing     Problem: MUSCULOSKELETAL - ADULT  Goal: Return mobility to safest level of function  Description: INTERVENTIONS:  - Assess patient stability and activity tolerance for standing, transferring and ambulating w/ or w/o assistive devices  - Assist with transfers and ambulation using safe patient handling equipment as needed  - Ensure adequate protection for wounds/incisions during mobilization  - Obtain PT/OT consults as needed  - Advance activity as appropriate  - Communicate ordered activity level and limitations with patient/family  3/7/2024 2111 by Nimo Angela RN  Outcome: Progressing  3/7/2024 2110 by Nimo Angela RN  Outcome: Progressing     Problem: Impaired Activities of Daily Living  Goal: Achieve highest/safest level of independence in self care  Description: Interventions:  - Assess ability and encourage patient to participate in  ADLs to maximize function  - Promote sitting position while performing ADLs such as feeding, grooming, and bathing  - Educate and encourage patient/family in tolerated functional activity level and precautions during self-care  - Provide support under elbow of weak side to prevent shoulder subluxation  3/7/2024 2111 by Nimo Angela RN  Outcome: Progressing  3/7/2024 2110 by Nimo Angela RN  Outcome: Progressing     Problem: RESPIRATORY - ADULT  Goal: Achieves optimal ventilation and oxygenation  Description: INTERVENTIONS:  - Assess for changes in respiratory status  - Assess for changes in mentation and behavior  - Position to facilitate oxygenation and minimize respiratory effort  - Oxygen supplementation based on oxygen saturation or ABGs  - Provide Smoking Cessation handout, if applicable  - Encourage broncho-pulmonary hygiene including cough, deep breathe, Incentive Spirometry  - Assess the need for suctioning and perform as needed  - Assess and instruct to report SOB or any respiratory difficulty  - Respiratory Therapy support as indicated  - Manage/alleviate anxiety  - Monitor for signs/symptoms of CO2 retention  3/7/2024 2111 by Nimo Angela RN  Outcome: Progressing  3/7/2024 2110 by Nimo Angela RN  Outcome: Progressing     Problem: SKIN/TISSUE INTEGRITY - ADULT  Goal: Incision(s), wounds(s) or drain site(s) healing without S/S of infection  Description: INTERVENTIONS:  - Assess and document risk factors for pressure ulcer development  - Assess and document skin integrity  - Assess and document dressing/incision, wound bed, drain sites and surrounding tissue  - Implement wound care per orders  - Initiate Pressure Ulcer prevention bundle as indicated  3/7/2024 2111 by Nimo Angela RN  Outcome: Progressing  3/7/2024 2110 by Nimo Angela RN  Outcome: Progressing     Problem: Delirium  Goal: Minimize duration of delirium  Description: Interventions:  - Encourage use of hearing aids, eye  glasses  - Promote highest level of mobility daily  - Provide frequent reorientation  - Promote wakefulness i.e. lights on, blinds open  - Promote sleep, encourage patient's normal rest cycle i.e. lights off, TV off, minimize noise and interruptions  - Encourage family to assist in orientation and promotion of home routines  3/7/2024 2111 by Nimo Angela, RN  Outcome: Progressing  3/7/2024 2110 by Nimo Angela, RN  Outcome: Progressing

## 2024-03-09 NOTE — PLAN OF CARE
Received patient on comfort care with family at bedside and morphine gtt infusing.    2044, patient asystole on monitor. No palpable carotid pulse, no audible heart tones, no spontaneous respirations. [facility] and MD's notified.

## 2024-03-09 NOTE — DISCHARGE SUMMARY
[facility] Hospitalist Discharge Summary     Patient ID:  Mahsa Zhang  88 year old  1935    Admit date: 2024    Discharge date and time: 3/8/2024 11:15 PM     Attending Physician: Gianni Varela DO     Primary Care Physician: Agus Martinez MD     Discharge Diagnoses:   Septic shock  Acute pyelonephritis   Acute hypoxic respiratory failure   Bilateral pleural effusions   DM II  A-fib  Chronic diastolic HF    Please note that only [facility] DMG and EMG patients enrolled in the Medicare ACO, [facility] ACO and [facility] HMOs will be handled by the [facility] Care Management team.  For all other patients, please follow usual protocol for discharge care transition.    Discharge Condition:      Disposition:       Important Follow up:  - PCP within N/A  - Consults: Cards, pulmCC, palliative care    Follow Up Items:  None      Hospital Course:       88 year old male who presented with fever/chills, generalized weakness, found to have septic shock secondary to pyelonephritis.  He was admitted the ICU requiring pressors and broad spectrum abx.  He was found to have bilateral pleural effusions required thoracentesis c/w CHF.   Despite aggressive treatment he continued to decline clinically.  Palliative care consulted, code status changed to DNAR/select.  His respiratory status worsened despite BiPAP.  After discussion with pt's family, decision was made to transition him to comfort care where he  shortly thereafter.      Consults: IP CONSULT TO PHARMACY  IP CONSULT TO CRITICAL CARE INTENSIVIST  NURSING CONSULT TO DIETITIAN  IP CONSULT TO SOCIAL WORK  IP CONSULT TO CARDIOLOGY  IP CONSULT TO SOCIAL WORK  IP CONSULT TO GASTROENTEROLOGY  IP CONSULT TO SOCIAL WORK  IP CONSULT TO PHARMACY  IP CONSULT TO VASCULAR ACCESS TEAM  IP CONSULT TO HOSPICE  IP CONSULT PALLIATIVE CARE  IP CONSULT TO SPIRITUAL CARE    Operative Procedures:   None      Patient instructions:      I as the attending physician reconciled the current and discharge medications on day of discharge.     Discharge Medication List as of 3/8/2024 11:47 PM        CONTINUE these medications which have NOT CHANGED    Details   traMADol 50 MG Oral Tab Take 1 tablet (50 mg total) by mouth daily., Historical      finasteride 5 MG Oral Tab Take 1 tablet (5 mg total) by mouth daily., Historical      metoprolol tartrate 50 MG Oral Tab Take 1 tablet (50 mg total) by mouth 2x Daily(Beta Blocker)., Normal, Disp-90 tablet, R-0      rivaroxaban (XARELTO) 15 MG Oral Tab TAKE 1 TABLET BY MOUTH DAILY WITH FOOD, Normal, Disp-90 tablet, R-0      Ferrous Sulfate 325 (65 Fe) MG Oral Tab Take 1 tablet (325 mg total) by mouth 2 (two) times daily., Historical      dilTIAZem HCl  MG Oral Capsule SR 24 Hr Take 1 capsule (180 mg total) by mouth daily., Print Script, Disp-30 capsule, R-1      levothyroxine 25 MCG Oral Tab Take 1 tablet (25 mcg total) by mouth before breakfast., Historical      tamsulosin (FLOMAX) cap TAKE 1 CAPSULE BY MOUTH DAILY, Normal, Disp-90 capsule, R-3      JANUVIA 25 MG Oral Tab TAKE 1 TABLET(25 MG) BY MOUTH DAILY, Normal, Disp-90 tablet, R-3      ATORVASTATIN 40 MG Oral Tab TAKE 1 TABLET BY MOUTH EVERY NIGHT AT BEDTIME, Normal, Disp-90 tablet, R-3      METFORMIN HCL 1000 MG Oral Tab TAKE 1 TABLET BY MOUTH TWICE DAILY WITH MEALS, Normal, Disp-180 tablet, R-3      aspirin (ASPIRIN EC LOW DOSE) 81 MG Oral Tab EC Take 1 tablet (81 mg total) by mouth once daily., Normal, Disp-90 tablet, R-3      meclizine 12.5 MG Oral Tab Take 1 tablet (12.5 mg total) by mouth 3 (three) times daily as needed for Dizziness., Historical      triamcinolone 0.1 % External Cream Apply 1 Application topically 2 (two) times daily as needed (legs and feet)., Historical      !! Glucose Blood (ONETOUCH ULTRA BLUE) In Vitro Strip Test blood glucose once daily  Diag E11.9, Normal, Disp-100 strip, R-3      !!  OneTouch UltraSoft Lancets Does not apply Misc Test blood glucose once daily  Diag E11.9, Normal, Disp-100 each, R-3      !! Chelsea Microlet Lancets Does not apply Misc Check blood glucose daily  diag E11.9, Normal, Disp-100 each, R-3      !! Glucose Blood (CONTOUR NEXT TEST) In Vitro Strip Check blood glucose daily  diag E11.9, Normal, Disp-100 each, R-3      !! Glucose Blood (CHELSEA CONTOUR NEXT TEST) In Vitro Strip Use to check blood glucose once daily in the morning before breakfast, Script not printed, Disp-100 strip, R-3      !! CHELSEA MICROLET LANCETS Does not apply Misc Use to check blood glucose once daily in the morning before breakfast, Normal, Disp-100 each, R-3       !! - Potential duplicate medications found. Please discuss with provider.          Activity: N/A  Diet: N/A  Wound Care: as directed  Code Status: DNAR/Comfort Care      Exam on day of discharge:     Vitals:    03/08/24 2044   BP:    Pulse: (!) 0   Resp: (!) 0   Temp:          Gianni Varela DO  [facility] Hospitalist

## (undated) DEVICE — 3M™ RED DOT™ MONITORING ELECTRODE WITH FOAM TAPE AND STICKY GEL, 50/BAG, 20/CASE, 72/PLT 2570: Brand: RED DOT™

## (undated) DEVICE — FILTERLINE NASAL ADULT O2/CO2

## (undated) DEVICE — ENDOSCOPY PACK UPPER: Brand: MEDLINE INDUSTRIES, INC.

## (undated) DEVICE — FORCEP BIOPSY RJ4 LG CAP W/ND

## (undated) DEVICE — Device: Brand: DEFENDO AIR/WATER/SUCTION AND BIOPSY VALVE

## (undated) DEVICE — 1200CC GUARDIAN II: Brand: GUARDIAN

## (undated) DEVICE — CATH BALLOON CRE 18-20MM 5838

## (undated) DEVICE — SYRINGE/GUAGE ASSEMBLY

## (undated) NOTE — LETTER
[facility]  Phone: [phone]  Right Fax: [phone]  [facility] By:  Aida Vasquez RN Date: 18    Patient Name: Geovani Michael  Surgery Date: 2018    CSN: [id number]  Medical Record: [id number]   :

## (undated) NOTE — LETTER
[address]  [address]  Authorization for Surgical Operation and Procedure     Date:___________                                                                                                         Time:__________  I hereby authorize Right thoracentesis my physician and his/her assistants (if applicable), which may include medical students, residents, and/or fellows, to perform the following surgical operation/ procedure and administer such anesthesia as may be determined necessary by my physician: Dr. Staton Operation/Procedure name (s)  on Mahsa Zhang   2.   I recognize that during the surgical operation/procedure, unforeseen conditions may necessitate additional or different procedures than those listed above.  I, therefore, further authorize and request that the above-named surgeon, assistants, or designees perform such procedures as are, in their judgment, necessary and desirable.    3.   My surgeon/physician has discussed prior to my surgery the potential benefits, risks and side effects of this procedure; the likelihood of achieving goals; and potential problems that might occur during recuperation.  They also discussed reasonable alternatives to the procedure, including risks, benefits, and side effects related to the alternatives and risks related to not receiving this procedure.  I have had all my questions answered and I acknowledge that no guarantee has been made as to the result that may be obtained.    4.   Should the need arise during my operation/procedure, which includes change of level of care prior to discharge, I also consent to the administration of blood and/or blood products.  Further, I understand that despite careful testing and screening of blood or blood products by collecting agencies, I may still be subject to ill effects as a result of receiving a blood transfusion and/or blood products.  The following are some, but not all, of the potential  risks that can occur: fever and allergic reactions, hemolytic reactions, transmission of diseases such as Hepatitis, AIDS and Cytomegalovirus (CMV) and fluid overload.  In the event that I wish to have an autologous transfusion of my own blood, or a directed donor transfusion, I will discuss this with my physician.  Check only if Refusing Blood or Blood Products  I understand refusal of blood or blood products as deemed necessary by my physician may have serious consequences to my condition to include possible death. I hereby assume responsibility for my refusal and release the hospital, its personnel, and my physicians from any responsibility for the consequences of my refusal.          o  Refuse      5.   I authorize the use of any specimen, organs, tissues, body parts or foreign objects that may be removed from my body during the operation/procedure for diagnosis, research or teaching purposes and their subsequent disposal by hospital authorities.  I also authorize the release of specimen test results and/or written reports to my treating physician on the hospital medical staff or other referring or consulting physicians involved in my care, at the discretion of the Pathologist or my treating physician.    6.   I consent to the photographing or videotaping of the operations or procedures to be performed, including appropriate portions of my body for medical, scientific, or educational purposes, provided my identity is not revealed by the pictures or by descriptive texts accompanying them.  If the procedure has been photographed/videotaped, the surgeon will obtain the original picture, image, videotape or CD.  The hospital will not be responsible for storage, release or maintenance of the picture, image, tape or CD.    7.   I consent to the presence of a  or observers in the operating room as deemed necessary by my physician or their designees.    8.   I recognize that in the event my procedure  results in extended X-Ray/fluoroscopy time, I may develop a skin reaction.    9. If I have a Do Not Attempt Resuscitation (DNAR) order in place, that status will be suspended while in the operating room, procedural suite, and during the recovery period unless otherwise explicitly stated by me (or a person authorized to consent on my behalf). The surgeon or my attending physician will determine when the applicable recovery period ends for purposes of reinstating the DNAR order.  10. Patients having a sterilization procedure: I understand that if the procedure is successful the results will be permanent and it will therefore be impossible for me to inseminate, conceive, or bear children.  I also understand that the procedure is intended to result in sterility, although the result has not been guaranteed.   11. I acknowledge that my physician has explained sedation/analgesia administration to me including the risk and benefits I consent to the administration of sedation/analgesia as may be necessary or desirable in the judgment of my physician.    I CERTIFY THAT I HAVE READ AND FULLY UNDERSTAND THE ABOVE CONSENT TO OPERATION and/or OTHER PROCEDURE.    _________________________________________  __________________________________  Signature of Patient     Signature of Responsible Person         ___________________________________         Printed Name of Responsible Person           _________________________________                 Relationship to Patient  _________________________________________  ______________________________  Signature of Witness          Date  Time      Patient Name: Mahsa Zhang     : 1935                 Printed: 2024     Medical Record #: [id number]                     Page 1 [address]  [address]    Consent for Anesthesia    I, Mahsa Zhang agree to be cared for by an anesthesiologist, who is  specially trained to monitor me and give me medicine to put me to sleep or keep me comfortable during my procedure    I understand that my anesthesiologist is not an employee or agent of [facility] or [employer]. He or she works for [employer].    As the patient asking for anesthesia services, I agree to:  Allow the anesthesiologist (anesthesia doctor) to give me medicine and do additional procedures as necessary. Some examples are: Starting or using an “IV” to give me medicine, fluids or blood during my procedure, and having a breathing tube placed to help me breathe when I’m asleep (intubation). In the event that my heart stops working properly, I understand that my anesthesiologist will make every effort to sustain my life, unless otherwise directed by [facility] Do Not Resuscitate documents.  Tell my anesthesia doctor before my procedure:  If I am pregnant.  The last time that I ate or drank.  All of the medicines I take (including prescriptions, herbal supplements, and pills I can buy without a prescription (including street drugs/illegal medications). Failure to inform my anesthesiologist about these medicines may increase my risk of anesthetic complications.  If I am allergic to anything or have had a reaction to anesthesia before.  I understand how the anesthesia medicine will help me (benefits).  I understand that with any type of anesthesia medicine there are risks:  The most common risks are: nausea, vomiting, sore throat, muscle soreness, damage to my eyes, mouth, or teeth (from breathing tube placement).  Rare risks include: remembering what happened during my procedure, allergic reactions to medications, injury to my airway, heart, lungs, vision, nerves, or muscles and in extremely rare instances death.  My doctor has explained to me other choices available to me for my care (alternatives).  Pregnant Patients (“epidural”):  I understand that the risks of having  an epidural (medicine given into my back to help control pain during labor), include itching, low blood pressure, difficulty urinating, headache or slowing of the baby’s heart. Very rare risks include infection, bleeding, seizure, irregular heart rhythms and nerve injury.  Regional Anesthesia (“spinal”, “epidural”, & “nerve blocks”):  I understand that rare but potential complications include headache, bleeding, infection, seizure, irregular heart rhythms, and nerve injury.    I can change my mind about having anesthesia services at any time before I get the medicine.    _____________________________________________________________________________  Patient (or Representative) Signature/Relationship to Patient  Date   Time    _____________________________________________________________________________   Name (if used)    Language/Organization   Time    _____________________________________________________________________________  Anesthesiologist Signature     Date   Time  I have discussed the procedure and information above with the patient (or patient’s representative) and answered their questions. The patient or their representative has agreed to have anesthesia services.    _____________________________________________________________________________  Witness        Date   Time  I have verified that the signature is that of the patient or patient’s representative, and that it was signed before the procedure  Patient Name: Mahsa Zhang     : 1935                 Printed: 2024     Medical Record #: [id number]                     Page 2 of 2

## (undated) NOTE — ED AVS SNAPSHOT
Pampawalt Wills   MRN: [id number]    Department:  [facility] Emergency Department   Date of Visit:  12/15/2017           Disclosure     Insurance plans vary and the physician(s) referred by the ER may not be covered by your plan.  Please contact your tell this physician (or your personal doctor if your instructions are to return to your personal doctor) about any new or lasting problems. The primary care or specialist physician will see patients referred from the [facility] Emergency Department.  Saúl Barclay

## (undated) NOTE — LETTER
[facility]  Conner Domingo [address]    Consent for Operation    Date: __________________    Time: _______________    1.  I authorize the performance upon Roxy Nelson the following operation:    Procedure(s):  ESOPHAGOGASTRODUODENOS videotape. The [facility] [place] [facility] will not be responsible for storage or maintenance of this tape. 6. For the purpose of advancing medical education, I consent to the admittance of observers to the Operating Room.     7. I authorize the use of any specimen, organs Signature of Patient:   ___________________________    When the patient is a minor or mentally incompetent to give consent:  Signature of person authorized to consent for patient: ___________________________   Relationship to patient: _____________________ drugs/illegal medications). Failure to inform my anesthesiologist about these medicines may increase my risk of anesthetic complications. · If I am allergic to anything or have had a reaction to anesthesia before.     3. I understand how the anesthesia med I have discussed the procedure and information above with the patient (or patient’s representative) and answered their questions. The patient or their representative has agreed to have anesthesia services.     _______________________________________________

## (undated) NOTE — LETTER
[facility] 4SW-A  [address] [address]  [phone]  AUTHORIZATION FOR SURGICAL OPERATION OR PROCEDURE                                                                          I hereby authorize Dr. Herrera , my physician and his/her assistants (if applicable), which may include medical students, residents, and/or fellows, to perform the following surgical operation/ procedure and administer such anesthesia as may be determined necessary by my physician:  Operation/Procedure name (s)  Left side thoracentesis On Mahsa Zhang.  2.   I recognize that during the surgical operation/procedure, unforeseen conditions may necessitate additional or different procedures than those listed above.  I, therefore, further authorize and request that the above-named surgeon, assistants, or designees perform such procedures as are, in their judgment, necessary and desirable.    3.   My surgeon/physician has discussed prior to my surgery the potential benefits, risks and side effects of this procedure; the likelihood of achieving goals; and potential problems that might occur during recuperation.  They also discussed reasonable alternatives to the procedure, including risks, benefits, and side effects related to the alternatives and risks related to not receiving this procedure.  I have had all my questions answered and I acknowledge that no guarantee has been made as to the result that may be obtained.    4.   Should the need arise during my operation/procedure, which includes change of level of care prior to discharge, I also consent to the administration of blood and/or blood products.  Further, I understand that despite careful testing and screening of blood or blood products by collecting agencies, I may still be subject to ill effects as a result of receiving a blood transfusion and/or blood products.  The following are some, but not all, of the potential risks that can occur: fever and  allergic reactions, hemolytic reactions, transmission of diseases such as Hepatitis, AIDS and Cytomegalovirus (CMV) and fluid overload.  In the event that I wish to have an autologous transfusion of my own blood, or a directed donor transfusion, I will discuss this with my physician.  Check only if Refusing Blood or Blood Products  I understand refusal of blood or blood products as deemed necessary by my physician may have serious consequences to my condition to include possible death. I hereby assume responsibility for my refusal and release the hospital, its personnel, and my physicians from any responsibility for the consequences of my refusal.          o  Refuse   5.   I authorize the use of any specimen, organs, tissues, body parts or foreign objects that may be removed from my body during the operation/procedure for diagnosis, research or teaching purposes and their subsequent disposal by hospital authorities.  I also authorize the release of specimen test results and/or written reports to my treating physician on the hospital medical staff or other referring or consulting physicians involved in my care, at the discretion of the Pathologist or my treating physician.    6.   I consent to the photographing or videotaping of the operations or procedures to be performed, including appropriate portions of my body for medical, scientific, or educational purposes, provided my identity is not revealed by the pictures or by descriptive texts accompanying them.  If the procedure has been photographed/videotaped, the surgeon will obtain the original picture, image, videotape or CD.  The hospital will not be responsible for storage, release or maintenance of the picture, image, tape or CD.    7.   I consent to the presence of a  or observers in the operating room as deemed necessary by my physician or their designees.    8.   I recognize that in the event my procedure results in extended  X-Ray/fluoroscopy time, I may develop a skin reaction.    9. If I have a Do Not Attempt Resuscitation (DNAR) order in place, that status will be suspended while in the operating room, procedural suite, and during the recovery period unless otherwise explicitly stated by me (or a person authorized to consent on my behalf). The surgeon or my attending physician will determine when the applicable recovery period ends for purposes of reinstating the DNAR order.  10. Patients having a sterilization procedure: I understand that if the procedure is successful the results will be permanent and it will therefore be impossible for me to inseminate, conceive, or bear children.  I also understand that the procedure is intended to result in sterility, although the result has not been guaranteed.   11. I acknowledge that my physician has explained sedation/analgesia administration to me including the risk and benefits I consent to the administration of sedation/analgesia as may be necessary or desirable in the judgment of my physician.    I CERTIFY THAT I HAVE READ AND FULLY UNDERSTAND THE ABOVE CONSENT TO OPERATION and/or OTHER PROCEDURE.     _________________________________________ _________________________________     ___________________________________  Signature of Patient     Signature of Responsible Person                   Printed Name of Responsible Person                              _________________________________________ ______________________________        ___________________________________  Signature of Witness         Date  Time         Relationship to Patient    Patient Name: Mahsa Zhang    : 1935   Printed: 3/1/2024      Medical Record #: [id number]                                              Page 1 of 1

## (undated) NOTE — LETTER
[address]  [address]  Authorization for Surgical Operation and Procedure     Date:___________                                                                                                         Time:__________  I hereby authorize Left thoracentesis, my physician and his/her assistants (if applicable), which may include medical students, residents, and/or fellows, to perform the following surgical operation/ procedure and administer such anesthesia as may be determined necessary by my physician: Dr. Staton Operation/Procedure name (s)  on Mahsa Zhang   2.   I recognize that during the surgical operation/procedure, unforeseen conditions may necessitate additional or different procedures than those listed above.  I, therefore, further authorize and request that the above-named surgeon, assistants, or designees perform such procedures as are, in their judgment, necessary and desirable.    3.   My surgeon/physician has discussed prior to my surgery the potential benefits, risks and side effects of this procedure; the likelihood of achieving goals; and potential problems that might occur during recuperation.  They also discussed reasonable alternatives to the procedure, including risks, benefits, and side effects related to the alternatives and risks related to not receiving this procedure.  I have had all my questions answered and I acknowledge that no guarantee has been made as to the result that may be obtained.    4.   Should the need arise during my operation/procedure, which includes change of level of care prior to discharge, I also consent to the administration of blood and/or blood products.  Further, I understand that despite careful testing and screening of blood or blood products by collecting agencies, I may still be subject to ill effects as a result of receiving a blood transfusion and/or blood products.  The following are some, but not all, of the potential  risks that can occur: fever and allergic reactions, hemolytic reactions, transmission of diseases such as Hepatitis, AIDS and Cytomegalovirus (CMV) and fluid overload.  In the event that I wish to have an autologous transfusion of my own blood, or a directed donor transfusion, I will discuss this with my physician.  Check only if Refusing Blood or Blood Products  I understand refusal of blood or blood products as deemed necessary by my physician may have serious consequences to my condition to include possible death. I hereby assume responsibility for my refusal and release the hospital, its personnel, and my physicians from any responsibility for the consequences of my refusal.          o  Refuse      5.   I authorize the use of any specimen, organs, tissues, body parts or foreign objects that may be removed from my body during the operation/procedure for diagnosis, research or teaching purposes and their subsequent disposal by hospital authorities.  I also authorize the release of specimen test results and/or written reports to my treating physician on the hospital medical staff or other referring or consulting physicians involved in my care, at the discretion of the Pathologist or my treating physician.    6.   I consent to the photographing or videotaping of the operations or procedures to be performed, including appropriate portions of my body for medical, scientific, or educational purposes, provided my identity is not revealed by the pictures or by descriptive texts accompanying them.  If the procedure has been photographed/videotaped, the surgeon will obtain the original picture, image, videotape or CD.  The hospital will not be responsible for storage, release or maintenance of the picture, image, tape or CD.    7.   I consent to the presence of a  or observers in the operating room as deemed necessary by my physician or their designees.    8.   I recognize that in the event my procedure  results in extended X-Ray/fluoroscopy time, I may develop a skin reaction.    9. If I have a Do Not Attempt Resuscitation (DNAR) order in place, that status will be suspended while in the operating room, procedural suite, and during the recovery period unless otherwise explicitly stated by me (or a person authorized to consent on my behalf). The surgeon or my attending physician will determine when the applicable recovery period ends for purposes of reinstating the DNAR order.  10. Patients having a sterilization procedure: I understand that if the procedure is successful the results will be permanent and it will therefore be impossible for me to inseminate, conceive, or bear children.  I also understand that the procedure is intended to result in sterility, although the result has not been guaranteed.   11. I acknowledge that my physician has explained sedation/analgesia administration to me including the risk and benefits I consent to the administration of sedation/analgesia as may be necessary or desirable in the judgment of my physician.    I CERTIFY THAT I HAVE READ AND FULLY UNDERSTAND THE ABOVE CONSENT TO OPERATION and/or OTHER PROCEDURE.    _________________________________________  __________________________________  Signature of Patient     Signature of Responsible Person         ___________________________________         Printed Name of Responsible Person           _________________________________                 Relationship to Patient  _________________________________________  ______________________________  Signature of Witness          Date  Time      Patient Name: Mahsa Zhang     : 1935                 Printed: 2024     Medical Record #: [id number]                     Page 1 [address]  [address]    Consent for Anesthesia    I, Mahsa Zhang agree to be cared for by an anesthesiologist, who is  specially trained to monitor me and give me medicine to put me to sleep or keep me comfortable during my procedure    I understand that my anesthesiologist is not an employee or agent of [facility] or [employer]. He or she works for [employer].    As the patient asking for anesthesia services, I agree to:  Allow the anesthesiologist (anesthesia doctor) to give me medicine and do additional procedures as necessary. Some examples are: Starting or using an “IV” to give me medicine, fluids or blood during my procedure, and having a breathing tube placed to help me breathe when I’m asleep (intubation). In the event that my heart stops working properly, I understand that my anesthesiologist will make every effort to sustain my life, unless otherwise directed by [facility] Do Not Resuscitate documents.  Tell my anesthesia doctor before my procedure:  If I am pregnant.  The last time that I ate or drank.  All of the medicines I take (including prescriptions, herbal supplements, and pills I can buy without a prescription (including street drugs/illegal medications). Failure to inform my anesthesiologist about these medicines may increase my risk of anesthetic complications.  If I am allergic to anything or have had a reaction to anesthesia before.  I understand how the anesthesia medicine will help me (benefits).  I understand that with any type of anesthesia medicine there are risks:  The most common risks are: nausea, vomiting, sore throat, muscle soreness, damage to my eyes, mouth, or teeth (from breathing tube placement).  Rare risks include: remembering what happened during my procedure, allergic reactions to medications, injury to my airway, heart, lungs, vision, nerves, or muscles and in extremely rare instances death.  My doctor has explained to me other choices available to me for my care (alternatives).  Pregnant Patients (“epidural”):  I understand that the risks of having  an epidural (medicine given into my back to help control pain during labor), include itching, low blood pressure, difficulty urinating, headache or slowing of the baby’s heart. Very rare risks include infection, bleeding, seizure, irregular heart rhythms and nerve injury.  Regional Anesthesia (“spinal”, “epidural”, & “nerve blocks”):  I understand that rare but potential complications include headache, bleeding, infection, seizure, irregular heart rhythms, and nerve injury.    I can change my mind about having anesthesia services at any time before I get the medicine.    _____________________________________________________________________________  Patient (or Representative) Signature/Relationship to Patient  Date   Time    _____________________________________________________________________________   Name (if used)    Language/Organization   Time    _____________________________________________________________________________  Anesthesiologist Signature     Date   Time  I have discussed the procedure and information above with the patient (or patient’s representative) and answered their questions. The patient or their representative has agreed to have anesthesia services.    _____________________________________________________________________________  Witness        Date   Time  I have verified that the signature is that of the patient or patient’s representative, and that it was signed before the procedure  Patient Name: Mahsa Zhang     : 1935                 Printed: 2024     Medical Record #: [id number]                     Page 2 of 2